# Patient Record
Sex: FEMALE | Race: BLACK OR AFRICAN AMERICAN | Employment: FULL TIME | URBAN - METROPOLITAN AREA
[De-identification: names, ages, dates, MRNs, and addresses within clinical notes are randomized per-mention and may not be internally consistent; named-entity substitution may affect disease eponyms.]

---

## 2017-02-15 DIAGNOSIS — I10 ESSENTIAL HYPERTENSION: ICD-10-CM

## 2017-02-15 RX ORDER — HYDROCHLOROTHIAZIDE 12.5 MG/1
12.5 TABLET ORAL DAILY
Qty: 90 TAB | Refills: 1 | Status: SHIPPED | OUTPATIENT
Start: 2017-02-15 | End: 2019-02-14 | Stop reason: SDUPTHER

## 2017-02-15 NOTE — TELEPHONE ENCOUNTER
See notes below. LOV 12/14/16  Last refill 12/14/16. Pt states she also needs a meter but it looks like Dr. Antione Louis sent a meter, strips & lancets on 12/27/16. Could you resend.

## 2017-03-10 ENCOUNTER — HOSPITAL ENCOUNTER (EMERGENCY)
Age: 32
Discharge: HOME | End: 2017-03-10
Attending: EMERGENCY MEDICINE
Payer: SELF-PAY

## 2017-03-10 VITALS
BODY MASS INDEX: 44.19 KG/M2 | HEART RATE: 98 BPM | TEMPERATURE: 98.3 F | HEIGHT: 60 IN | OXYGEN SATURATION: 97 % | RESPIRATION RATE: 16 BRPM | WEIGHT: 225.09 LBS | DIASTOLIC BLOOD PRESSURE: 99 MMHG | SYSTOLIC BLOOD PRESSURE: 159 MMHG

## 2017-03-10 DIAGNOSIS — J06.9 ACUTE UPPER RESPIRATORY INFECTION: Primary | ICD-10-CM

## 2017-03-10 LAB
FLUAV AG NPH QL IA: NEGATIVE
FLUBV AG NOSE QL IA: NEGATIVE

## 2017-03-10 PROCEDURE — 99282 EMERGENCY DEPT VISIT SF MDM: CPT

## 2017-03-10 PROCEDURE — 87804 INFLUENZA ASSAY W/OPTIC: CPT | Performed by: EMERGENCY MEDICINE

## 2017-03-10 RX ORDER — CROMOLYN SODIUM 5.2 MG/ML
1 SPRAY, METERED NASAL 4 TIMES DAILY
Qty: 26 ML | Refills: 0 | Status: SHIPPED | OUTPATIENT
Start: 2017-03-10 | End: 2017-11-29

## 2017-03-10 NOTE — ED NOTES
Pt given discharge instructions by Dr Omaira Shea, pt verbalizes an understanding, stable at time of discharge ambulates to maikel

## 2017-03-10 NOTE — LETTER
NOTIFICATION RETURN TO WORK / SCHOOL 
 
3/10/2017 11:10 AM 
 
Ms. Princess Simpson [address] [phone] To Whom It May Concern: 
 
Princess Simpson is currently under the care of [facility] EMERGENCY DEPT. She will return to work/school on: 3/13 If there are questions or concerns please have the patient contact our office. Sincerely, Risa Rudd.  Nuria Garcia MD

## 2017-03-10 NOTE — ED TRIAGE NOTES
Pt ambulates to treatment area she states that since yesterday she has had a sinus headache and congestion with yellow drainage. She has taken Nyquil and Allegra for the symptoms but continues to feel bad. She works in a  and is concerned about the flu.   Has had some chills but no fevers

## 2017-03-10 NOTE — DISCHARGE INSTRUCTIONS
Saline Nasal Washes: Care Instructions  Your Care Instructions  Saline nasal washes help keep the nasal passages open by washing out thick or dried mucus. This simple remedy can help relieve symptoms of allergies, sinusitis, and colds. It also can make the nose feel more comfortable by keeping the mucous membranes moist. You may notice a little burning sensation in your nose the first few times you use the solution, but this usually gets better in a few days. Follow-up care is a key part of your treatment and safety. Be sure to make and go to all appointments, and call your doctor if you are having problems. It's also a good idea to know your test results and keep a list of the medicines you take. How can you care for yourself at home? · You can buy premixed saline solution in a squeeze bottle or other sinus rinse products at a drugstore. Read and follow the instructions on the label. · You also can make your own saline solution by adding 1 teaspoon of salt and 1 teaspoon of baking soda to 2 cups of distilled water. · If you use a homemade solution, pour a small amount into a clean bowl. Using a rubber bulb syringe, squeeze the syringe and place the tip in the salt water. Pull a small amount of the salt water into the syringe by relaxing your hand. · Sit down with your head tilted slightly back. Do not lie down. Put the tip of the bulb syringe or the squeeze bottle a little way into one of your nostrils. Gently drip or squirt a few drops into the nostril. Repeat with the other nostril. Some sneezing and gagging are normal at first.  · Gently blow your nose. · Wipe the syringe or bottle tip clean after each use. · Repeat this 2 or 3 times a day. · Use nasal washes gently if you have nosebleeds often. When should you call for help? Watch closely for changes in your health, and be sure to contact your doctor if:  · You often get nosebleeds. · You have problems doing the nasal washes.   Where can you learn more? Go to http://salvatore-elton.info/. Enter [id number] in the search box to learn more about \"Saline Nasal Washes: Care Instructions. \"  Current as of: July 29, 2016  Content Version: 11.1  © 7704-1721 [employer]. Care instructions adapted under license by [employer] (which disclaims liability or warranty for this information). If you have questions about a medical condition or this instruction, always ask your healthcare professional. [address] any warranty or liability for your use of this information.

## 2017-03-10 NOTE — ED PROVIDER NOTES
Patient is a 27 y.o. female presenting with nasal congestion. The history is provided by the patient. Nasal Congestion   This is a new problem. The current episode started yesterday. The problem occurs constantly. The problem has been gradually worsening. Associated symptoms include headaches. Pertinent negatives include no chest pain, no abdominal pain and no shortness of breath. Nothing aggravates the symptoms. Nothing relieves the symptoms. Treatments tried: Nyquil, Allegra. The treatment provided no relief. Past Medical History:   Diagnosis Date    Anemia     Depression 2013    Diabetes ([address]     Essential hypertension     Hypertension     IBS (irritable bowel syndrome)        Past Surgical History:   Procedure Laterality Date     DELIVERY ONLY      TWins at 28 wks; IOL; NRFS         Family History:   Problem Relation Age of Onset    Hypertension Mother     Bleeding Prob Mother      blood clots    Thyroid Disease Mother     Diabetes Father    Amor Spell Elevated Lipids Father     Breast Cancer Paternal Aunt      breast    Cancer Maternal Grandmother      cervix    Uterine Cancer Maternal Grandmother 66     Hysterectomy and chemo    Colon Cancer Neg Hx     Ovarian Cancer Neg Hx        Social History     Social History    Marital status: SINGLE     Spouse name: N/A    Number of children: N/A    Years of education: N/A     Occupational History    Not on file. Social History Main Topics    Smoking status: Never Smoker    Smokeless tobacco: Never Used    Alcohol use Yes      Comment: occas    Drug use: No    Sexual activity: Yes     Partners: Male     Birth control/ protection: None      Comment: KJ; Other Topics Concern    Not on file     Social History Narrative         ALLERGIES: Pcn [penicillins]    Review of Systems   Constitutional: Negative for chills and fever. HENT: Negative for ear pain and sore throat. Eyes: Negative for pain.    Respiratory: Negative for chest tightness and shortness of breath. Cardiovascular: Negative for chest pain and leg swelling. Gastrointestinal: Negative for abdominal pain, nausea and vomiting. Genitourinary: Negative for dysuria and flank pain. Musculoskeletal: Negative for back pain. Skin: Negative for rash. Neurological: Positive for headaches. All other systems reviewed and are negative. Vitals:    03/10/17 1009   BP: (!) 159/99   Pulse: 98   Resp: 16   Temp: 98.3 °F (36.8 °C)   SpO2: 97%   Weight: 102.1 kg (225 lb 1.4 oz)   Height: 5' (1.524 m)            Physical Exam   Constitutional: She appears well-developed and well-nourished. HENT:   Head: Normocephalic and atraumatic. Mouth/Throat: Oropharynx is clear and moist.   Swollen nasal mucosa. Rhinnorrhea   Eyes: Conjunctivae and EOM are normal. Pupils are equal, round, and reactive to light. No scleral icterus. Neck: Neck supple. No tracheal deviation present. Cardiovascular: Normal rate, regular rhythm and normal heart sounds. Pulmonary/Chest: Effort normal and breath sounds normal. No respiratory distress. Abdominal: Soft. She exhibits no distension. There is no tenderness. There is no rebound and no guarding. Genitourinary:   Genitourinary Comments: deferred   Musculoskeletal: She exhibits no edema. Neurological: She is alert. Skin: Skin is warm and dry. Psychiatric: She has a normal mood and affect. Nursing note and vitals reviewed. [facility]  ED Course       Procedures      LABORATORY TESTS:  Recent Results (from the past 24 hour(s))   INFLUENZA A & B AG (RAPID TEST)    Collection Time: 03/10/17 10:18 AM   Result Value Ref Range    Influenza A Antigen NEGATIVE  NEG      Influenza B Antigen NEGATIVE  NEG         IMAGING RESULTS:  No results found. MEDICATIONS GIVEN:  Medications - No data to display    IMPRESSION:  1.  Acute upper respiratory infection        PLAN:  -  Nasal cromlyn, saline wash, OTC decongestants, PCP f/u    Disposition:  home     Condition: stable    Total critical care time spend exclusive of procedures:  0      Jarvis Burt MD

## 2017-05-05 ENCOUNTER — HOSPITAL ENCOUNTER (EMERGENCY)
Age: 32
Discharge: HOME | End: 2017-05-05
Attending: EMERGENCY MEDICINE | Admitting: EMERGENCY MEDICINE
Payer: SELF-PAY

## 2017-05-05 VITALS
WEIGHT: 216 LBS | TEMPERATURE: 98 F | SYSTOLIC BLOOD PRESSURE: 165 MMHG | HEART RATE: 94 BPM | DIASTOLIC BLOOD PRESSURE: 72 MMHG | OXYGEN SATURATION: 95 % | HEIGHT: 61 IN | BODY MASS INDEX: 40.78 KG/M2 | RESPIRATION RATE: 18 BRPM

## 2017-05-05 DIAGNOSIS — H10.33 ACUTE CONJUNCTIVITIS OF BOTH EYES, UNSPECIFIED ACUTE CONJUNCTIVITIS TYPE: Primary | ICD-10-CM

## 2017-05-05 PROCEDURE — 99282 EMERGENCY DEPT VISIT SF MDM: CPT

## 2017-05-05 RX ORDER — PSEUDOEPHEDRINE HCL 30 MG
60 TABLET ORAL
Qty: 12 TAB | Refills: 0 | Status: SHIPPED | OUTPATIENT
Start: 2017-05-05 | End: 2017-07-05 | Stop reason: ALTCHOICE

## 2017-05-05 RX ORDER — ERYTHROMYCIN 5 MG/G
OINTMENT OPHTHALMIC
Qty: 1 G | Refills: 0 | Status: SHIPPED | OUTPATIENT
Start: 2017-05-05 | End: 2017-07-05

## 2017-05-05 RX ORDER — FLUTICASONE PROPIONATE 50 MCG
2 SPRAY, SUSPENSION (ML) NASAL DAILY
Qty: 1 BOTTLE | Refills: 0 | Status: SHIPPED | OUTPATIENT
Start: 2017-05-05 | End: 2017-11-29

## 2017-05-05 NOTE — ED NOTES
The patient was discharged home by Dr. Brett Sun in stable condition. The patient is alert and oriented, is in no respiratory distress. The patient's diagnosis, condition and treatment were explained to patient or parent/guardian. The patient/responsible party expressed understanding. 3 prescriptions given to pt. No work/school note given to pt. A discharge plan has been developed. A  was not involved in the process. Aftercare instructions were given to the patient.

## 2017-05-05 NOTE — ED TRIAGE NOTES
Pt ambulatory to treatment area with c/o \"my left ear started hurting yesterday and this morning I woke up with some crusting to my left eye. \"  Pt states \"my left eye is itching also. 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 \"

## 2017-05-05 NOTE — DISCHARGE INSTRUCTIONS
Pinkeye: Care Instructions  Your Care Instructions    Pinkeye is redness and swelling of the eye surface and the conjunctiva (the lining of the eyelid and the covering of the white part of the eye). Pinkeye is also called conjunctivitis. Pinkeye is often caused by infection with bacteria or a virus. Dry air, allergies, smoke, and chemicals are other common causes. Pinkeye often clears on its own in 7 to 10 days. Antibiotics only help if the pinkeye is caused by bacteria. Pinkeye caused by infection spreads easily. If an allergy or chemical is causing pinkeye, it will not go away unless you can avoid whatever is causing it. Follow-up care is a key part of your treatment and safety. Be sure to make and go to all appointments, and call your doctor if you are having problems. Its also a good idea to know your test results and keep a list of the medicines you take. How can you care for yourself at home? · Wash your hands often. Always wash them before and after you treat pinkeye or touch your eyes or face. · Use moist cotton or a clean, wet cloth to remove crust. Wipe from the inside corner of the eye to the outside. Use a clean part of the cloth for each wipe. · Put cold or warm wet cloths on your eye a few times a day if the eye hurts. · Do not wear contact lenses or eye makeup until the pinkeye is gone. Throw away any eye makeup you were using when you got pinkeye. Clean your contacts and storage case. If you wear disposable contacts, use a new pair when your eye has cleared and it is safe to wear contacts again. · If the doctor gave you antibiotic ointment or eyedrops, use them as directed. Use the medicine for as long as instructed, even if your eye starts looking better soon. Keep the bottle tip clean, and do not let it touch the eye area. · To put in eyedrops or ointment:  ¨ Tilt your head back, and pull your lower eyelid down with one finger.   ¨ Drop or squirt the medicine inside the lower lid.  ¨ Close your eye for 30 to 60 seconds to let the drops or ointment move around. ¨ Do not touch the ointment or dropper tip to your eyelashes or any other surface. · Do not share towels, pillows, or washcloths while you have pinkeye. When should you call for help? Call your doctor now or seek immediate medical care if:  · You have pain in your eye, not just irritation on the surface. · You have a change in vision or loss of vision. · You have an increase in discharge from the eye. · Your eye has not started to improve or begins to get worse within 48 hours after you start using antibiotics. · Pinkeye lasts longer than 7 days. Watch closely for changes in your health, and be sure to contact your doctor if you have any problems. Where can you learn more? Go to http://salvatoreFinancial Fairy Taleselton.info/. Enter Y392 in the search box to learn more about \"Pinkeye: Care Instructions. \"  Current as of: May 27, 2016  Content Version: 11.2  © 8264-4863 [employer]. Care instructions adapted under license by [employer] (which disclaims liability or warranty for this information). If you have questions about a medical condition or this instruction, always ask your healthcare professional. [address] any warranty or liability for your use of this information. We hope that we have addressed all of your medical concerns. The examination and treatment you received in the Emergency Department were for an emergent problem and were not intended as complete care. It is important that you follow up with your healthcare provider(s) for ongoing care. If your symptoms worsen or do not improve as expected, and you are unable to reach your usual health care provider(s), you should return to the Emergency Department.       Today's healthcare is undergoing tremendous change, and patient satisfaction surveys are one of the many tools to assess the quality of medical care.  You may receive a survey from the [employer] organization regarding your experience in the Emergency Department. I hope that your experience has been completely positive, particularly the medical care that I provided. As such, please participate in the survey; anything less than excellent does not meet my expectations or intentions. [facility] and [facility] participate in nationally recognized quality of care measures. If your blood pressure is greater than 120/80, as reported below, we urge that you seek medical care to address the potential of high blood pressure, commonly known as hypertension. Hypertension can be hereditary or can be caused by certain medical conditions, pain, stress, or \"white coat syndrome. \"       Please make an appointment with your health care provider(s) for follow up of your Emergency Department visit. VITALS:   Patient Vitals for the past 8 hrs:   Temp Pulse Resp BP SpO2   05/05/17 0821 98 °F (36.7 °C) 94 18 165/72 95 %          Thank you for allowing us to provide you with medical care today. We realize that you have many choices for your emergency care needs. Please choose us in the future for any continued health care needs. Kian Chirinos , [address]: [phone]            No results found for this or any previous visit (from the past 24 hour(s)). No results found.

## 2017-05-05 NOTE — LETTER
NOTIFICATION RETURN TO WORK / SCHOOL 
 
5/5/2017 8:45 AM 
 
Ms. Maggi Orr [address] [place], [place] [phone] To Whom It May Concern: 
 
Maggi Orr is currently under the care of [facility] EMERGENCY DEPT. She will return to work/school on: 5/8/17 If there are questions or concerns please have the patient contact our office. Sincerely, Gm Jarrell.  Mary Olivia MD

## 2017-05-05 NOTE — ED PROVIDER NOTES
Patient is a 27 y.o. female presenting with eye problem and ear pain. The history is provided by the patient. Eye Problem    This is a new problem. The current episode started 6 to 12 hours ago. The problem occurs daily. The problem has not changed since onset. Both eyes are affected. The injury mechanism was none. The pain is at a severity of 0/10. The patient is experiencing no pain. There is no history of trauma to the eye. Known exposure: works at a . Pertinent negatives include no blurred vision, no double vision, no nausea, no vomiting, no fever and no pain. She has tried nothing for the symptoms. Ear Pain    The current episode started yesterday. The problem has not changed since onset. There has been no fever. The pain is at a severity of 7/10. Associated symptoms include rhinorrhea and sore throat. Pertinent negatives include no headaches, no abdominal pain, no vomiting and no rash. 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 Past Medical History:   Diagnosis Date    Anemia     Depression 2013    Diabetes ([address]     Essential hypertension     Hypertension     IBS (irritable bowel syndrome)        Past Surgical History:   Procedure Laterality Date     DELIVERY ONLY      TWins at 28 wks; IOL; NRFS         Family History:   Problem Relation Age of Onset    Hypertension Mother     Bleeding Prob Mother      blood clots    Thyroid Disease Mother     Diabetes Father    Win[place] Re Elevated Lipids Father     Breast Cancer Paternal Aunt      breast    Cancer Maternal Grandmother      cervix    Uterine Cancer Maternal Grandmother 66     Hysterectomy and chemo    Colon Cancer Neg Hx     Ovarian Cancer Neg Hx        Social History     Social History    Marital status: SINGLE     Spouse name: N/A    Number of children: N/A    Years of education: N/A     Occupational History    Not on file.      Social History Main Topics    Smoking status: Never Smoker    Smokeless tobacco: Never Used  Alcohol use Yes      Comment: occas    Drug use: No    Sexual activity: Yes     Partners: Male     Birth control/ protection: None      Comment: KJ; Other Topics Concern    Not on file     Social History Narrative         ALLERGIES: Pcn [penicillins]    Review of Systems   Constitutional: Negative for chills and fever. HENT: Positive for ear pain, rhinorrhea and sore throat. Eyes: Negative for blurred vision, double vision and pain. Respiratory: Negative for chest tightness and shortness of breath. Cardiovascular: Negative for chest pain and leg swelling. Gastrointestinal: Negative for abdominal pain, nausea and vomiting. Genitourinary: Negative for dysuria and flank pain. Musculoskeletal: Negative for back pain. Skin: Negative for rash. Neurological: Negative for headaches. All other systems reviewed and are negative. Vitals:    05/05/17 0821   BP: 165/72   Pulse: 94   Resp: 18   Temp: 98 °F (36.7 °C)   SpO2: 95%   Weight: 98 kg (216 lb)   Height: 5' 1\" (1.549 m)            Physical Exam   Constitutional: She appears well-developed and well-nourished. HENT:   Head: Normocephalic and atraumatic. Right Ear: Tympanic membrane and external ear normal.   Left Ear: Tympanic membrane and external ear normal.   Nose: Mucosal edema and rhinorrhea present. Mouth/Throat: Oropharynx is clear and moist. No oropharyngeal exudate or tonsillar abscesses. Eyes: EOM are normal. Pupils are equal, round, and reactive to light. Right conjunctiva is injected. Left conjunctiva is injected. No scleral icterus. Neck: Neck supple. No tracheal deviation present. Cardiovascular: Normal rate, regular rhythm and normal heart sounds. Pulmonary/Chest: Effort normal and breath sounds normal. No respiratory distress. Abdominal: Soft. She exhibits no distension. There is no tenderness. There is no rebound and no guarding.    Genitourinary:   Genitourinary Comments: deferred   Musculoskeletal: She exhibits no edema. Neurological: She is alert. Skin: Skin is warm and dry. Psychiatric: She has a normal mood and affect. Nursing note and vitals reviewed. MDM  ED Course         MDM:  27 y.o. female with no significant past medical history presents with ear pain, eye discharge  Differential diagnosis includes viral syndrome, conjunctivitis             LABORATORY TESTS:  Labs Reviewed - No data to display    IMAGING RESULTS:  No results found. MEDICATIONS GIVEN:  Medications - No data to display    IMPRESSION:  1.  Acute conjunctivitis of both eyes, unspecified acute conjunctivitis type        PLAN:  -  Antibiotic eye ointment, flonase, pseudophed    Disposition:  home, see patient instructions for treatment and plan    Condition:  good and stable    Total critical care time spent exclusive of procedures:  0    Lara Pemberton MD      Procedures

## 2017-06-27 ENCOUNTER — HOSPITAL ENCOUNTER (EMERGENCY)
Age: 32
Discharge: HOME | End: 2017-06-28
Attending: EMERGENCY MEDICINE
Payer: SELF-PAY

## 2017-06-27 DIAGNOSIS — K52.9 ILEITIS: Primary | ICD-10-CM

## 2017-06-27 DIAGNOSIS — A59.01 TRICHOMONAS VAGINITIS: ICD-10-CM

## 2017-06-27 DIAGNOSIS — R10.31 ABDOMINAL PAIN, RIGHT LOWER QUADRANT: ICD-10-CM

## 2017-06-27 PROCEDURE — 96361 HYDRATE IV INFUSION ADD-ON: CPT

## 2017-06-27 PROCEDURE — 99284 EMERGENCY DEPT VISIT MOD MDM: CPT

## 2017-06-27 PROCEDURE — 96375 TX/PRO/DX INJ NEW DRUG ADDON: CPT

## 2017-06-27 PROCEDURE — 96374 THER/PROPH/DIAG INJ IV PUSH: CPT

## 2017-06-27 PROCEDURE — 96372 THER/PROPH/DIAG INJ SC/IM: CPT

## 2017-06-27 RX ORDER — SODIUM CHLORIDE 0.9 % (FLUSH) 0.9 %
5-10 SYRINGE (ML) INJECTION EVERY 8 HOURS
Status: DISCONTINUED | OUTPATIENT
Start: 2017-06-27 | End: 2017-06-28 | Stop reason: HOSPADM

## 2017-06-27 RX ORDER — SODIUM CHLORIDE 0.9 % (FLUSH) 0.9 %
5-10 SYRINGE (ML) INJECTION AS NEEDED
Status: DISCONTINUED | OUTPATIENT
Start: 2017-06-27 | End: 2017-06-28 | Stop reason: HOSPADM

## 2017-06-27 NOTE — LETTER
[facility] EMERGENCY DEPT 
[address] [facility] [address] [id number] 
[phone] Work/School Note Date: 6/27/2017 To Whom It May concern: 
 
Kwasi Jeff was seen and treated today in the emergency room by the following provider(s): 
Attending Provider: Kris Ferraro MD.   
 
Kwasi Jeff may return to work on [facility].  
 
Sincerely, 
 
 
 
 
Kris Ferraro MD

## 2017-06-28 ENCOUNTER — APPOINTMENT (OUTPATIENT)
Dept: CT IMAGING | Age: 32
End: 2017-06-28
Attending: EMERGENCY MEDICINE
Payer: SELF-PAY

## 2017-06-28 VITALS
SYSTOLIC BLOOD PRESSURE: 137 MMHG | RESPIRATION RATE: 18 BRPM | DIASTOLIC BLOOD PRESSURE: 88 MMHG | WEIGHT: 228.4 LBS | BODY MASS INDEX: 43.12 KG/M2 | TEMPERATURE: 98.7 F | HEIGHT: 61 IN | HEART RATE: 101 BPM | OXYGEN SATURATION: 99 %

## 2017-06-28 LAB
ALBUMIN SERPL BCP-MCNC: 3.4 G/DL (ref 3.5–5)
ALBUMIN/GLOB SERPL: 0.9 {RATIO} (ref 1.1–2.2)
ALP SERPL-CCNC: 72 U/L (ref 45–117)
ALT SERPL-CCNC: 31 U/L (ref 12–78)
ANION GAP BLD CALC-SCNC: 8 MMOL/L (ref 5–15)
APPEARANCE UR: CLEAR
AST SERPL W P-5'-P-CCNC: 18 U/L (ref 15–37)
BACTERIA URNS QL MICRO: NEGATIVE /HPF
BASOPHILS # BLD AUTO: 0 K/UL (ref 0–0.1)
BASOPHILS # BLD: 0 % (ref 0–1)
BILIRUB SERPL-MCNC: 0.4 MG/DL (ref 0.2–1)
BILIRUB UR QL: NEGATIVE
BUN SERPL-MCNC: 9 MG/DL (ref 6–20)
BUN/CREAT SERPL: 12 (ref 12–20)
CALCIUM SERPL-MCNC: 8.5 MG/DL (ref 8.5–10.1)
CHLORIDE SERPL-SCNC: 101 MMOL/L (ref 97–108)
CO2 SERPL-SCNC: 26 MMOL/L (ref 21–32)
COLOR UR: ABNORMAL
CREAT SERPL-MCNC: 0.76 MG/DL (ref 0.55–1.02)
DIFFERENTIAL METHOD BLD: ABNORMAL
EOSINOPHIL # BLD: 0.3 K/UL (ref 0–0.4)
EOSINOPHIL NFR BLD: 2 % (ref 0–7)
EPITH CASTS URNS QL MICRO: ABNORMAL /LPF
ERYTHROCYTE [DISTWIDTH] IN BLOOD BY AUTOMATED COUNT: 12.7 % (ref 11.5–14.5)
GLOBULIN SER CALC-MCNC: 3.9 G/DL (ref 2–4)
GLUCOSE SERPL-MCNC: 111 MG/DL (ref 65–100)
GLUCOSE UR STRIP.AUTO-MCNC: NEGATIVE MG/DL
HCG UR QL: NEGATIVE
HCT VFR BLD AUTO: 36.3 % (ref 35–47)
HGB BLD-MCNC: 12 G/DL (ref 11.5–16)
HGB UR QL STRIP: ABNORMAL
KETONES UR QL STRIP.AUTO: NEGATIVE MG/DL
KOH PREP SPEC: NORMAL
LEUKOCYTE ESTERASE UR QL STRIP.AUTO: ABNORMAL
LIPASE SERPL-CCNC: 102 U/L (ref 73–393)
LYMPHOCYTES # BLD AUTO: 12 % (ref 12–49)
LYMPHOCYTES # BLD: 1.6 K/UL (ref 0.8–3.5)
MCH RBC QN AUTO: 29.6 PG (ref 26–34)
MCHC RBC AUTO-ENTMCNC: 33.1 G/DL (ref 30–36.5)
MCV RBC AUTO: 89.6 FL (ref 80–99)
MONOCYTES # BLD: 1.1 K/UL (ref 0–1)
MONOCYTES NFR BLD AUTO: 8 % (ref 5–13)
NEUTS SEG # BLD: 10.1 K/UL (ref 1.8–8)
NEUTS SEG NFR BLD AUTO: 78 % (ref 32–75)
NITRITE UR QL STRIP.AUTO: NEGATIVE
PH UR STRIP: 6 [PH] (ref 5–8)
PLATELET # BLD AUTO: 262 K/UL (ref 150–400)
POTASSIUM SERPL-SCNC: 3.6 MMOL/L (ref 3.5–5.1)
PROT SERPL-MCNC: 7.3 G/DL (ref 6.4–8.2)
PROT UR STRIP-MCNC: NEGATIVE MG/DL
RBC # BLD AUTO: 4.05 M/UL (ref 3.8–5.2)
RBC #/AREA URNS HPF: ABNORMAL /HPF (ref 0–5)
SERVICE CMNT-IMP: NORMAL
SODIUM SERPL-SCNC: 135 MMOL/L (ref 136–145)
SP GR UR REFRACTOMETRY: 1.02 (ref 1–1.03)
TRICHOMONAS UR QL MICRO: PRESENT
UROBILINOGEN UR QL STRIP.AUTO: 0.2 EU/DL (ref 0.2–1)
WBC # BLD AUTO: 13.1 K/UL (ref 3.6–11)
WBC URNS QL MICRO: ABNORMAL /HPF (ref 0–4)

## 2017-06-28 PROCEDURE — 36415 COLL VENOUS BLD VENIPUNCTURE: CPT | Performed by: EMERGENCY MEDICINE

## 2017-06-28 PROCEDURE — 74011250637 HC RX REV CODE- 250/637: Performed by: EMERGENCY MEDICINE

## 2017-06-28 PROCEDURE — 87086 URINE CULTURE/COLONY COUNT: CPT | Performed by: EMERGENCY MEDICINE

## 2017-06-28 PROCEDURE — 81025 URINE PREGNANCY TEST: CPT

## 2017-06-28 PROCEDURE — 81001 URINALYSIS AUTO W/SCOPE: CPT | Performed by: EMERGENCY MEDICINE

## 2017-06-28 PROCEDURE — 85025 COMPLETE CBC W/AUTO DIFF WBC: CPT | Performed by: EMERGENCY MEDICINE

## 2017-06-28 PROCEDURE — 74011000250 HC RX REV CODE- 250: Performed by: EMERGENCY MEDICINE

## 2017-06-28 PROCEDURE — 83690 ASSAY OF LIPASE: CPT | Performed by: EMERGENCY MEDICINE

## 2017-06-28 PROCEDURE — 74011250636 HC RX REV CODE- 250/636: Performed by: EMERGENCY MEDICINE

## 2017-06-28 PROCEDURE — 74177 CT ABD & PELVIS W/CONTRAST: CPT

## 2017-06-28 PROCEDURE — 87491 CHLMYD TRACH DNA AMP PROBE: CPT | Performed by: EMERGENCY MEDICINE

## 2017-06-28 PROCEDURE — 87210 SMEAR WET MOUNT SALINE/INK: CPT | Performed by: EMERGENCY MEDICINE

## 2017-06-28 PROCEDURE — 74011636320 HC RX REV CODE- 636/320: Performed by: EMERGENCY MEDICINE

## 2017-06-28 PROCEDURE — 80053 COMPREHEN METABOLIC PANEL: CPT | Performed by: EMERGENCY MEDICINE

## 2017-06-28 RX ORDER — ONDANSETRON 2 MG/ML
8 INJECTION INTRAMUSCULAR; INTRAVENOUS
Status: COMPLETED | OUTPATIENT
Start: 2017-06-28 | End: 2017-06-28

## 2017-06-28 RX ORDER — METRONIDAZOLE 250 MG/1
500 TABLET ORAL
Status: COMPLETED | OUTPATIENT
Start: 2017-06-28 | End: 2017-06-28

## 2017-06-28 RX ORDER — ONDANSETRON 8 MG/1
8 TABLET, ORALLY DISINTEGRATING ORAL
Qty: 12 TAB | Refills: 0 | Status: SHIPPED | OUTPATIENT
Start: 2017-06-28 | End: 2017-07-05 | Stop reason: ALTCHOICE

## 2017-06-28 RX ORDER — AZITHROMYCIN 250 MG/1
1000 TABLET, FILM COATED ORAL
Status: COMPLETED | OUTPATIENT
Start: 2017-06-28 | End: 2017-06-28

## 2017-06-28 RX ORDER — ONDANSETRON 4 MG/1
4 TABLET, ORALLY DISINTEGRATING ORAL
Status: COMPLETED | OUTPATIENT
Start: 2017-06-28 | End: 2017-06-28

## 2017-06-28 RX ORDER — LEVOFLOXACIN 750 MG/1
750 TABLET ORAL
Status: COMPLETED | OUTPATIENT
Start: 2017-06-28 | End: 2017-06-28

## 2017-06-28 RX ORDER — OXYCODONE AND ACETAMINOPHEN 5; 325 MG/1; MG/1
1-2 TABLET ORAL
Qty: 20 TAB | Refills: 0 | Status: SHIPPED | OUTPATIENT
Start: 2017-06-28 | End: 2017-07-05

## 2017-06-28 RX ORDER — OXYCODONE AND ACETAMINOPHEN 5; 325 MG/1; MG/1
1 TABLET ORAL
Status: COMPLETED | OUTPATIENT
Start: 2017-06-28 | End: 2017-06-28

## 2017-06-28 RX ORDER — LEVOFLOXACIN 750 MG/1
750 TABLET ORAL DAILY
Qty: 10 TAB | Refills: 0 | Status: SHIPPED | OUTPATIENT
Start: 2017-06-28 | End: 2017-07-08

## 2017-06-28 RX ORDER — ONDANSETRON 4 MG/1
4 TABLET, ORALLY DISINTEGRATING ORAL
Qty: 12 TAB | Refills: 0 | Status: SHIPPED | OUTPATIENT
Start: 2017-06-28 | End: 2017-07-05 | Stop reason: ALTCHOICE

## 2017-06-28 RX ORDER — METRONIDAZOLE 500 MG/1
500 TABLET ORAL 3 TIMES DAILY
Qty: 30 TAB | Refills: 0 | Status: SHIPPED | OUTPATIENT
Start: 2017-06-28 | End: 2017-07-05 | Stop reason: SINTOL

## 2017-06-28 RX ORDER — HYDROMORPHONE HYDROCHLORIDE 1 MG/ML
1 INJECTION, SOLUTION INTRAMUSCULAR; INTRAVENOUS; SUBCUTANEOUS
Status: COMPLETED | OUTPATIENT
Start: 2017-06-28 | End: 2017-06-28

## 2017-06-28 RX ADMIN — IOPAMIDOL 100 ML: 755 INJECTION, SOLUTION INTRAVENOUS at 01:00

## 2017-06-28 RX ADMIN — SODIUM CHLORIDE 1000 ML: 900 INJECTION, SOLUTION INTRAVENOUS at 00:35

## 2017-06-28 RX ADMIN — OXYCODONE HYDROCHLORIDE AND ACETAMINOPHEN 1 TABLET: 5; 325 TABLET ORAL at 02:59

## 2017-06-28 RX ADMIN — ONDANSETRON 8 MG: 2 INJECTION INTRAMUSCULAR; INTRAVENOUS at 00:38

## 2017-06-28 RX ADMIN — CEFTRIAXONE SODIUM 250 MG: 250 INJECTION, POWDER, FOR SOLUTION INTRAMUSCULAR; INTRAVENOUS at 02:44

## 2017-06-28 RX ADMIN — HYDROMORPHONE HYDROCHLORIDE 1 MG: 1 INJECTION, SOLUTION INTRAMUSCULAR; INTRAVENOUS; SUBCUTANEOUS at 00:39

## 2017-06-28 RX ADMIN — AZITHROMYCIN 1000 MG: 250 TABLET, FILM COATED ORAL at 02:48

## 2017-06-28 RX ADMIN — ONDANSETRON 4 MG: 4 TABLET, ORALLY DISINTEGRATING ORAL at 02:59

## 2017-06-28 RX ADMIN — LEVOFLOXACIN 750 MG: 750 TABLET, FILM COATED ORAL at 02:48

## 2017-06-28 RX ADMIN — METRONIDAZOLE 500 MG: 250 TABLET, FILM COATED ORAL at 02:48

## 2017-06-28 NOTE — ED PROVIDER NOTES
HPI Comments: Susan Rondon  Patient presents emergency room chief complaint of abdominal pain. She initially had pain in her pain located in the right lower abdomen. The pain radiates to her back. The pain was initially coming and going, but has become constant. The pain is sharp and very severe. The pain is 11/10. She has felt subjective fever, but there's been no documented fever. She has noted chills. She denies any shortness of breath. She notes the pain is increased with cough. She has a mild nonproductive cough. She has nausea, but no vomiting. She denies constipation, and states her last bowel movement was at 12 PM. No dysuria or hematuria. She does have pink spotting or vaginal discharge. She is taking Tylenol with no relief. Her surgical history includes C-sections x2. She has no personal history of kidney stones. Her nephew gets kidney stones. She has no other concerns or complaints. Her mother drove her to the ED. Patient is a 27 y.o. female presenting with abdominal pain. The history is provided by the patient. Abdominal Pain    Associated symptoms include nausea and vomiting. Pertinent negatives include no fever, no diarrhea, no dysuria, no headaches and no chest pain.         Past Medical History:   Diagnosis Date    Anemia     Depression 2013    Diabetes ([facility] [address]     Essential hypertension     Hypertension     IBS (irritable bowel syndrome)        Past Surgical History:   Procedure Laterality Date     DELIVERY ONLY      TWins at 28 wks; IOL; NRFS         Family History:   Problem Relation Age of Onset    Hypertension Mother     Bleeding Prob Mother      blood clots    Thyroid Disease Mother     Diabetes Father    [place] Elevated Lipids Father     Breast Cancer Paternal Aunt      breast    Cancer Maternal Grandmother      cervix    Uterine Cancer Maternal Grandmother 66     Hysterectomy and chemo    Colon Cancer Neg Hx     Ovarian Cancer Neg Hx        Social History Social History    Marital status: SINGLE     Spouse name: N/A    Number of children: N/A    Years of education: N/A     Occupational History    Not on file. Social History Main Topics    Smoking status: Never Smoker    Smokeless tobacco: Never Used    Alcohol use Yes      Comment: occas    Drug use: No    Sexual activity: Yes     Partners: Male     Birth control/ protection: None      Comment: KJ; Other Topics Concern    Not on file     Social History Narrative         ALLERGIES: Pcn [penicillins]    Review of Systems   Constitutional: Negative for appetite change and fever. HENT: Negative for congestion, nosebleeds and sore throat. Eyes: Negative for discharge. Respiratory: Negative for cough and shortness of breath. Cardiovascular: Negative for chest pain. Gastrointestinal: Positive for abdominal pain, nausea and vomiting. Negative for diarrhea. Genitourinary: Positive for vaginal bleeding (spotting - has IUD). Negative for dysuria, vaginal discharge and vaginal pain. Musculoskeletal: Negative. Skin: Negative for rash. Neurological: Negative for weakness and headaches. Hematological: Negative for adenopathy. Psychiatric/Behavioral: Negative. All other systems reviewed and are negative. Vitals:    06/27/17 2317 06/28/17 0105 06/28/17 0110 06/28/17 0306   BP: (!) 172/99 (!) 150/92  137/88   Pulse: (!) 124   (!) 101   Resp: 20   18   Temp: 98.7 °F (37.1 °C)      SpO2: 100%  97% 99%   Weight: 103.6 kg (228 lb 6.3 oz)      Height: 5' 1\" (1.549 m)               Physical Exam   Constitutional: She is oriented to person, place, and time. She appears well-developed and well-nourished. She appears distressed. HENT:   Head: Normocephalic and atraumatic. Mouth/Throat: Oropharynx is clear and moist.   Eyes: Conjunctivae are normal.   Neck: Normal range of motion. Neck supple. Cardiovascular: Regular rhythm and normal heart sounds.     tachycardia   Pulmonary/Chest: Effort normal and breath sounds normal.   Abdominal: Soft. Bowel sounds are normal. There is tenderness (Rlq). There is no rebound and no guarding. Genitourinary: Vaginal discharge found. Genitourinary Comments: Strings for IUD visualized. Discharge noted. Mild pain with cervical motion. Musculoskeletal: Normal range of motion. She exhibits no edema or tenderness. Neurological: She is alert and oriented to person, place, and time. Skin: Skin is warm and dry. Psychiatric: She has a normal mood and affect. Her behavior is normal.   Nursing note and vitals reviewed. [facility]  ED Course       Procedures    A/P:  1. Terminal ileitis - At discharge, the patient notes she may have a diagnosis from Dr. Alvina Crane of Crohns disease from colonoscopy >10 years ago. She is advised to f/u wt Dr. Alvina Crane ASAP. Will treat with Cipro and Flagyl. Percocet and Zofran prn.  2. Trichomonas - Flagyl. 3. Cervical motion tenderness - may be referred pain from ileitis. However, if chlamydia positive, she should be treated with Doxycylcine for possible PID. Patient's results have been reviewed with them. Patient and/or family have verbally conveyed their understanding and agreement of the patient's signs, symptoms, diagnosis, treatment and prognosis and additionally agree to follow up as recommended or return to the Emergency Room should their condition change prior to follow-up. Discharge instructions have also been provided to the patient with some educational information regarding their diagnosis as well a list of reasons why they would want to return to the ER prior to their follow-up appointment should their condition change.

## 2017-06-28 NOTE — ED NOTES
Pt moved to room 11 for pelvic exam, MD discussed test results with patient, pt verbalized understanding.  Pelvic exam completed by MD, swabs sent to lab

## 2017-06-28 NOTE — ED TRIAGE NOTES
Triage note: Pt reports R side abd pain that started tonight. Pt reports that she has an IUD and had some spotting and fleshy discharge.

## 2017-06-28 NOTE — ED NOTES
Patient discharged by MD with RN at bedside. Pt verbalized understanding of discharge instructions and denies questions or concerns. Pt given prescriptions.  Pt stable and ambulatory at time of discharge

## 2017-06-28 NOTE — DISCHARGE INSTRUCTIONS
We hope that we have addressed all of your medical concerns. The examination and treatment you received in the Emergency Department were for an emergent problem and were not intended as complete care. It is important that you follow up with your healthcare provider(s) for ongoing care. If your symptoms worsen or do not improve as expected, and you are unable to reach your usual health care provider(s), you should return to the Emergency Department. Today's healthcare is undergoing tremendous change, and patient satisfaction surveys are one of the many tools to assess the quality of medical care. You may receive a survey from the [employer] regarding your experience in the Emergency Department. I hope that your experience has been completely positive, particularly the medical care that I provided. As such, please participate in the survey; anything less than excellent does not meet my expectations or intentions. [facility] and [facility] participate in nationally recognized quality of care measures. If your blood pressure is greater than 120/80, as reported below, we urge that you seek medical care to address the potential of high blood pressure, commonly known as hypertension. Hypertension can be hereditary or can be caused by certain medical conditions, pain, stress, or \"white coat syndrome. \"       Please make an appointment with your health care provider(s) for follow up of your Emergency Department visit. VITALS:   Patient Vitals for the past 8 hrs:   Temp Pulse Resp BP SpO2   06/28/17 0110 - - - - 97 %   06/28/17 0105 - - - (!) 150/92 -   06/27/17 2317 98.7 °F (37.1 °C) (!) 124 20 (!) 172/99 100 %          Thank you for allowing us to provide you with medical care today. We realize that you have many choices for your emergency care needs. Please choose us in the future for any continued health care needs.       Regards, Ross Cervantes Tonio [facility], [address]   Office: [phone]          Recent Results (from the past 24 hour(s))   URINALYSIS W/ RFLX MICROSCOPIC    Collection Time: 06/28/17 12:09 AM   Result Value Ref Range    Color YELLOW/STRAW      Appearance CLEAR CLEAR      Specific gravity 1.020 1.003 - 1.030      pH (UA) 6.0 5.0 - 8.0      Protein NEGATIVE  NEG mg/dL    Glucose NEGATIVE  NEG mg/dL    Ketone NEGATIVE  NEG mg/dL    Bilirubin NEGATIVE  NEG      Blood MODERATE (A) NEG      Urobilinogen 0.2 0.2 - 1.0 EU/dL    Nitrites NEGATIVE  NEG      Leukocyte Esterase SMALL (A) NEG     CBC WITH AUTOMATED DIFF    Collection Time: 06/28/17 12:09 AM   Result Value Ref Range    WBC 13.1 (H) 3.6 - 11.0 K/uL    RBC 4.05 3.80 - 5.20 M/uL    HGB 12.0 11.5 - 16.0 g/dL    HCT 36.3 35.0 - 47.0 %    MCV 89.6 80.0 - 99.0 FL    MCH 29.6 26.0 - 34.0 PG    MCHC 33.1 30.0 - 36.5 g/dL    RDW 12.7 11.5 - 14.5 %    PLATELET 816 738 - 685 K/uL    NEUTROPHILS 78 (H) 32 - 75 %    LYMPHOCYTES 12 12 - 49 %    MONOCYTES 8 5 - 13 %    EOSINOPHILS 2 0 - 7 %    BASOPHILS 0 0 - 1 %    ABS. NEUTROPHILS 10.1 (H) 1.8 - 8.0 K/UL    ABS. LYMPHOCYTES 1.6 0.8 - 3.5 K/UL    ABS. MONOCYTES 1.1 (H) 0.0 - 1.0 K/UL    ABS. EOSINOPHILS 0.3 0.0 - 0.4 K/UL    ABS. BASOPHILS 0.0 0.0 - 0.1 K/UL    DF AUTOMATED     METABOLIC PANEL, COMPREHENSIVE    Collection Time: 06/28/17 12:09 AM   Result Value Ref Range    Sodium 135 (L) 136 - 145 mmol/L    Potassium 3.6 3.5 - 5.1 mmol/L    Chloride 101 97 - 108 mmol/L    CO2 26 21 - 32 mmol/L    Anion gap 8 5 - 15 mmol/L    Glucose 111 (H) 65 - 100 mg/dL    BUN 9 6 - 20 MG/DL    Creatinine 0.76 0.55 - 1.02 MG/DL    BUN/Creatinine ratio 12 12 - 20      GFR est AA >60 >60 ml/min/1.73m2    GFR est non-AA >60 >60 ml/min/1.73m2    Calcium 8.5 8.5 - 10.1 MG/DL    Bilirubin, total 0.4 0.2 - 1.0 MG/DL    ALT (SGPT) 31 12 - 78 U/L    AST (SGOT) 18 15 - 37 U/L    Alk.  phosphatase 72 45 - 117 U/L    Protein, total 7.3 6.4 - 8.2 g/dL    Albumin 3.4 (L) 3.5 - 5.0 g/dL    Globulin 3.9 2.0 - 4.0 g/dL    A-G Ratio 0.9 (L) 1.1 - 2.2     LIPASE    Collection Time: 06/28/17 12:09 AM   Result Value Ref Range    Lipase 102 73 - 393 U/L   URINE MICROSCOPIC ONLY    Collection Time: 06/28/17 12:09 AM   Result Value Ref Range    WBC 10-20 0 - 4 /hpf    RBC 5-10 0 - 5 /hpf    Epithelial cells MODERATE (A) FEW /lpf    Bacteria NEGATIVE  NEG /hpf    Trichomonas PRESENT (A) NEG     HCG URINE, QL. - POC    Collection Time: 06/28/17 12:12 AM   Result Value Ref Range    Pregnancy test,urine (POC) NEGATIVE  NEG     KOH, OTHER SOURCES    Collection Time: 06/28/17  2:26 AM   Result Value Ref Range    Special Requests: NO SPECIAL REQUESTS      KOH NO YEAST SEEN         Ct Abd Pelv W Cont    Result Date: 6/28/2017  EXAM:  CT abdomen pelvis with contrast INDICATION: Right lower abdomen pain. COMPARISON: 10/26/2014. TECHNIQUE: Helical CT of the abdomen  and pelvis  following the uneventful intravenous administration of nonionic contrast.  Coronal and sagittal reformats are performed. CT dose reduction was achieved through use of a standardized protocol tailored for this examination and automatic exposure control for dose modulation. Adaptive statistical iterative reconstruction (ASIR) was utilized. FINDINGS: The visualized lung bases demonstrate no mass or consolidation. The heart size is normal. There is no pericardial or pleural effusion. The liver, spleen, pancreas, and adrenal glands are normal. The kidneys are symmetric without hydronephrosis. There are gallstones without intra- or extra-hepatic biliary dilatation. There is mild dilatation of a segment of the terminal ileum without a discrete transition point. There is mild adjacent mesenteric fat stranding. There are no dilated bowel loops proximal or distal to this abnormal terminal ileum segment. The appendix is normal.  There are no enlarged lymph nodes.   There is no free fluid or free air. The aorta tapers without aneurysm. There is a small fat-containing umbilical hernia. The urinary bladder is normal.  There is no pelvic mass. An IUD is present. The bony structures are age-appropriate. IMPRESSION: Findings of nonspecific infection or inflammation involving the terminal ileum. There is no bowel obstruction. The appendix is unremarkable. Abdominal Pain: Care Instructions  Your Care Instructions    Abdominal pain has many possible causes. Some aren't serious and get better on their own in a few days. Others need more testing and treatment. If your pain continues or gets worse, you need to be rechecked and may need more tests to find out what is wrong. You may need surgery to correct the problem. Don't ignore new symptoms, such as fever, nausea and vomiting, urination problems, pain that gets worse, and dizziness. These may be signs of a more serious problem. Your doctor may have recommended a follow-up visit in the next 8 to 12 hours. If you are not getting better, you may need more tests or treatment. The doctor has checked you carefully, but problems can develop later. If you notice any problems or new symptoms, get medical treatment right away. Follow-up care is a key part of your treatment and safety. Be sure to make and go to all appointments, and call your doctor if you are having problems. It's also a good idea to know your test results and keep a list of the medicines you take. How can you care for yourself at home? · Rest until you feel better. · To prevent dehydration, drink plenty of fluids, enough so that your urine is light yellow or clear like water. Choose water and other caffeine-free clear liquids until you feel better. If you have kidney, heart, or liver disease and have to limit fluids, talk with your doctor before you increase the amount of fluids you drink. · If your stomach is upset, eat mild foods, such as rice, dry toast or crackers, bananas, and applesauce. Try eating several small meals instead of two or three large ones. · Wait until 48 hours after all symptoms have gone away before you have spicy foods, alcohol, and drinks that contain caffeine. · Do not eat foods that are high in fat. · Avoid anti-inflammatory medicines such as aspirin, ibuprofen (Advil, Motrin), and naproxen (Aleve). These can cause stomach upset. Talk to your doctor if you take daily aspirin for another health problem. When should you call for help? Call 911 anytime you think you may need emergency care. For example, call if:  · You passed out (lost consciousness). · You pass maroon or very bloody stools. · You vomit blood or what looks like coffee grounds. · You have new, severe belly pain. Call your doctor now or seek immediate medical care if:  · Your pain gets worse, especially if it becomes focused in one area of your belly. · You have a new or higher fever. · Your stools are black and look like tar, or they have streaks of blood. · You have unexpected vaginal bleeding. · You have symptoms of a urinary tract infection. These may include:  ¨ Pain when you urinate. ¨ Urinating more often than usual.  ¨ Blood in your urine. · You are dizzy or lightheaded, or you feel like you may faint. Watch closely for changes in your health, and be sure to contact your doctor if:  · You are not getting better after 1 day (24 hours). Where can you learn more? Go to http://salvatore-elton.info/. Enter T781 in the search box to learn more about \"Abdominal Pain: Care Instructions. \"  Current as of: March 20, 2017  Content Version: 11.3  © 7451-8687 [employer]. Care instructions adapted under license by [employer] (which disclaims liability or warranty for this information).  If you have questions about a medical condition or this instruction, always ask your healthcare professional. [address] any warranty or liability for your use of this information. Trichomoniasis: Care Instructions  Your Care Instructions  Trichomoniasis is a sexually transmitted infection (STI) that is spread by having sex with an infected partner. Trichomoniasis is commonly called trich (say \"trick\"). In women, trich may cause vaginal itching and a smelly discharge. But in many cases, especially in men, there are no symptoms. Hermesnetta Secretary is treated so that you do not spread it to others. Both you and your sex partner or partners should be treated at the same time so you do not infect each other again. Trich may cause problems with pregnancy. Your doctor will talk with you about treatment for Trich if you are pregnant. Follow-up care is a key part of your treatment and safety. Be sure to make and go to all appointments, and call your doctor if you are having problems. Its also a good idea to know your test results and keep a list of the medicines you take. How can you care for yourself at home? · Take your antibiotics as directed. Do not stop taking them just because you feel better. You need to take the full course of antibiotics. · Do not have sex while you are being treated. If your doctor gave you a single dose of antibiotics, do not have sex for one week after being treated and until your partner also has been treated. · Tell your sex partner (or partners) that he or she will also need to be tested and treated. · Use a cold water compress or cool baths to relieve itching. To prevent trichomoniasis in the future  · Use latex condoms every time you have sex. Use them from the beginning to the end of sexual contact. · Talk to your partner before having sex. Find out if he or she has or is at risk for trich or any other STI. Keep in mind that a person may be able to spread an STI even if he or she does not have symptoms. · Do not have sex if you are being treated for trich or any other STI.   · Do not have sex with anyone who has symptoms of an STI, such as sores on the genitals or mouth. · Having one sex partner (who does not have STIs and does not have sex with anyone else) is a good way to avoid STIs. When should you call for help? Call your doctor now or seek immediate medical care if:  · You have unusual vaginal bleeding. · You have a fever. · You have new discharge from the vagina or penis. · You have pelvic pain. Watch closely for changes in your health, and be sure to contact your doctor if:  · You do not get better as expected. · You have any new symptoms or your symptoms get worse. Where can you learn more? Go to http://salvatore-elton.info/. Enter Q389 in the search box to learn more about \"Trichomoniasis: Care Instructions. \"  Current as of: 2017  Content Version: 11.3  © 9386-8305 [employer]. Care instructions adapted under license by [employer] (which disclaims liability or warranty for this information). If you have questions about a medical condition or this instruction, always ask your healthcare professional. [address] any warranty or liability for your use of this information. eBillme Activation    Thank you for requesting access to [employer]. Please follow the instructions below to securely access and download your online medical record. [employer] allows you to send messages to your doctor, view your test results, renew your prescriptions, schedule appointments, and more. How Do I Sign Up? 1. In your internet browser, go to [email or web address]  2. Click on the First Time User? Click Here link in the Sign In box. You will be redirect to the New Member Sign Up page. 3. Enter your eBillme Access Code exactly as it appears below. You will not need to use this code after youve completed the sign-up process. If you do not sign up before the expiration date, you must request a new code.     eBillme Access Code: [email or web address]  Expires: 2017 11:09 PM (This is the date your X[employer] access code will )    4. Enter the last four digits of your Social Security Number (xxxx) and Date of Birth (mm/dd/yyyy) as indicated and click Submit. You will be taken to the next sign-up page. 5. Create a [employer] ID. This will be your XODIS login ID and cannot be changed, so think of one that is secure and easy to remember. 6. Create a XODIS password. You can change your password at any time. 7. Enter your Password Reset Question and Answer. This can be used at a later time if you forget your password. 8. Enter your e-mail address. You will receive e-mail notification when new information is available in [address] 9. Click Sign Up. You can now view and download portions of your medical record. 10. Click the Download Summary menu link to download a portable copy of your medical information. Additional Information    If you have questions, please visit the Frequently Asked Questions section of the XODIS website at [email or web address] Remember, XODIS is NOT to be used for urgent needs. For medical emergencies, dial 911.

## 2017-06-29 LAB
BACTERIA SPEC CULT: NORMAL
CC UR VC: NORMAL
SERVICE CMNT-IMP: NORMAL

## 2017-06-30 LAB
C TRACH DNA SPEC QL NAA+PROBE: NEGATIVE
N GONORRHOEA DNA SPEC QL NAA+PROBE: POSITIVE
SAMPLE TYPE: ABNORMAL
SERVICE CMNT-IMP: ABNORMAL
SPECIMEN SOURCE: ABNORMAL

## 2017-07-05 ENCOUNTER — HOSPITAL ENCOUNTER (OUTPATIENT)
Dept: LAB | Age: 32
Discharge: HOME | End: 2017-07-05
Payer: MEDICAID

## 2017-07-05 ENCOUNTER — OFFICE VISIT (OUTPATIENT)
Dept: MIDWIFE SERVICES | Age: 32
End: 2017-07-05

## 2017-07-05 VITALS
BODY MASS INDEX: 42.29 KG/M2 | DIASTOLIC BLOOD PRESSURE: 103 MMHG | HEART RATE: 84 BPM | HEIGHT: 61 IN | WEIGHT: 224 LBS | SYSTOLIC BLOOD PRESSURE: 145 MMHG

## 2017-07-05 DIAGNOSIS — Z01.419 ENCOUNTER FOR WELL WOMAN EXAM WITH ROUTINE GYNECOLOGICAL EXAM: Primary | ICD-10-CM

## 2017-07-05 DIAGNOSIS — Z20.2 TRICHOMONAS CONTACT, TREATED: ICD-10-CM

## 2017-07-05 PROCEDURE — 88175 CYTOPATH C/V AUTO FLUID REDO: CPT | Performed by: OBSTETRICS & GYNECOLOGY

## 2017-07-05 PROCEDURE — 87624 HPV HI-RISK TYP POOLED RSLT: CPT | Performed by: OBSTETRICS & GYNECOLOGY

## 2017-07-05 NOTE — PROGRESS NOTES
Chief Complaint   Patient presents with    Well Woman     Visit Vitals    BP (!) 145/103    Pulse 84    Ht 5' 1\" (1.549 m)    Wt 224 lb (101.6 kg)    BMI 42.32 kg/m2     1. Have you been to the ER, urgent care clinic since your last visit? Hospitalized since your last visit? No    2. Have you seen or consulted any other health care providers outside of the [address] [place] [address] since your last visit? Include any pap smears or colon screening.  No      Here today for well woman exam      Verbal orders for pap and nuswab per dr Mariia Hannah

## 2017-07-05 NOTE — PROGRESS NOTES
Well Woman Check Up       Subjective:     27 y.o.  AmericanF   LMP:  for routine annual exam    Social History: single partner, contraception - IUD. Pertinent past medical history: . PAP History:    Neg  Preventive Measures. Colonoscopy:  Age 48 or earlier based on signigicant FH  Mammogram:  Age 48 or earlier based on FH  DEXA: Age 72 or sooner based on risk factors  Lipids:  Age 36 or based on FH/risk factors    Patient Active Problem List    Diagnosis Date Noted    Pre-diabetes 2016    Depression 2013    Fibroids 2012    HTN (hypertension) 10/12/2012     Current Outpatient Prescriptions   Medication Sig Dispense Refill    levoFLOXacin (LEVAQUIN) 750 mg tablet Take 1 Tab by mouth daily for 10 days. 10 Tab 0    fluticasone (FLONASE) 50 mcg/actuation nasal spray 2 Sprays by Both Nostrils route daily. 1 Bottle 0    cromolyn (NASALCROM) 5.2 mg/spray (4 %) nasal spray 1 Deputy by Both Nostrils route four (4) times daily. 26 mL 0    hydroCHLOROthiazide (HYDRODIURIL) 12.5 mg tablet Take 1 Tab by mouth daily. 90 Tab 1    glucose blood VI test strips (FREESTYLE LITE STRIPS) strip Check fasting blood glucose daily. 100 Strip 2    Blood-Glucose Meter monitoring kit Check fasting blood glucose daily as directed. Please dispense Freestyle Lite meter 1 Kit 0    Lancets misc Test fasting blood glucose daily. 100 Each 2    metFORMIN (GLUCOPHAGE) 500 mg tablet Take 1 Tab by mouth daily (with dinner). 90 Tab 1    ranitidine (ZANTAC) 150 mg tablet Take 1 Tab by mouth two (2) times a day. 60 Tab 1    oxyCODONE-acetaminophen (PERCOCET) 5-325 mg per tablet Take 1-2 Tabs by mouth every four (4) hours as needed for Pain for up to 7 days. Max Daily Amount: 12 Tabs.  20 Tab 0     Allergies   Allergen Reactions    Pcn [Penicillins] Hives     Past Medical History:   Diagnosis Date    Anemia     Depression 2013    Diabetes ([address]     Essential hypertension     Hypertension  IBS (irritable bowel syndrome)      Past Surgical History:   Procedure Laterality Date     DELIVERY ONLY      TWins at 28 wks; IOL; NRFS     Family History   Problem Relation Age of Onset    Hypertension Mother     Bleeding Prob Mother      blood clots    Thyroid Disease Mother     Diabetes Father    [facility] Elevated Lipids Father     Breast Cancer Paternal Aunt      breast    Cancer Maternal Grandmother      cervix    Uterine Cancer Maternal Grandmother 66     Hysterectomy and chemo    Colon Cancer Neg Hx     Ovarian Cancer Neg Hx      Social History   Substance Use Topics    Smoking status: Never Smoker    Smokeless tobacco: Never Used    Alcohol use Yes      Comment: occas        ROS:  Feeling well. No dyspnea or chest pain on exertion. No abdominal pain, change in bowel habits, black or bloody stools. No urinary tract symptoms. GYN ROS: normal menses, no abnormal bleeding, pelvic pain or discharge, no breast pain or new or enlarging lumps on self exam, she complains of intolerance of abx for trich,. No neurological complaints. Objective:     Visit Vitals    BP (!) 145/103    Pulse 84    Ht 5' 1\" (1.549 m)    Wt 224 lb (101.6 kg)    BMI 42.32 kg/m2     Gen: The patient appears well, alert, oriented x 3, in no distress. ENT:  normal.   Neck: supple. No adenopathy or thyromegaly. GRISEL. Lungs:  clear, good air entry, no wheezes, rhonchi or rales. CV: S1 and S2 normal, no murmurs, regular rate and rhythm. Abdomen: soft without tenderness, guarding, mass or organomegaly. Extremities:  no edema, normal peripheral pulses. Neurological:normal, no focal findings. BREAST EXAM: Examined upright and supine. Benign symmetrical breasts with everted nipples. No masses, no nodes. No nipple retraction or discharge. No skin retraction or subclavicular or axillary lymphadenopathy.     PELVIC EXAM: VULVA: normal appearing vulva with no masses, tenderness or lesions, VAGINA: normal appearing vagina with normal color and discharge, no lesions, CERVIX: normal appearing cervix without discharge or lesions, UTERUS: uterus is normal size, shape, consistency and nontender, ADNEXA: normal adnexa in size, nontender and no masses, PAP: Pap smear done today, thin-prep method, DNA probe for chlamydia and GC obtained, HPV test exam chaperoned by:  Radha Goode RN      Assessment/Plan:     Kb Moon was seen today for well woman. Diagnoses and all orders for this visit:    Encounter for well woman exam with routine gynecological exam  -     PAP (IMAGE GUIDED) + HPV HIGH RISK    Trichomonas contact, treated  -     CT/NG/T.VAGINALIS AMPLIFICATION      Follow-up Disposition:  Return in about 1 year (around 7/5/2018) for Annual exam pending labs. Felisa Corona MD, [address] [place] [address], Retired    Xavier Alfred, [address]

## 2017-07-07 LAB
C TRACH RRNA SPEC QL NAA+PROBE: NEGATIVE
N GONORRHOEA RRNA SPEC QL NAA+PROBE: POSITIVE
T VAGINALIS RRNA SPEC QL NAA+PROBE: POSITIVE

## 2017-07-07 NOTE — PROGRESS NOTES
NG needs 125 mg IM Rocephin, and report to Health department. Will need JOSE in 6 weeks  T. Vaginalis needs 2 grams of Flagyl single dose and JOSE in 6 weeks. Report to Health Department and partner testing.

## 2017-07-11 RX ORDER — METRONIDAZOLE 500 MG/1
2000 TABLET ORAL ONCE
Qty: 4 TAB | Refills: 0 | Status: SHIPPED | OUTPATIENT
Start: 2017-07-11 | End: 2017-07-11

## 2017-07-11 NOTE — PROGRESS NOTES
Spoke with patient and she is coming in tomorrow for her shot. Her flagyl prescription was sent to her pharmacy today and patient states she will take today.

## 2017-07-12 ENCOUNTER — CLINICAL SUPPORT (OUTPATIENT)
Dept: MIDWIFE SERVICES | Age: 32
End: 2017-07-12

## 2017-07-12 VITALS
WEIGHT: 224 LBS | HEART RATE: 92 BPM | DIASTOLIC BLOOD PRESSURE: 84 MMHG | BODY MASS INDEX: 42.29 KG/M2 | SYSTOLIC BLOOD PRESSURE: 132 MMHG | HEIGHT: 61 IN

## 2017-07-12 DIAGNOSIS — A54.00: Primary | ICD-10-CM

## 2017-07-12 RX ORDER — CEFTRIAXONE 500 MG/1
500 INJECTION, POWDER, FOR SOLUTION INTRAMUSCULAR; INTRAVENOUS ONCE
Qty: 1 VIAL | Refills: 0
Start: 2017-07-12 | End: 2017-07-12

## 2017-07-12 NOTE — PROGRESS NOTES
Saw patient today and she received her injection of rocephin in her left glute and did say she took her flagyl yesterday and that she will make an appt for 6 weeks from now for a test of cure. Health dept was also notified of her STD.

## 2017-07-12 NOTE — PROGRESS NOTES
Chief Complaint   Patient presents with    Injection     of rocephin to treat her gonnorheae     Visit Vitals    /84    Pulse 92    Ht 5' 1\" (1.549 m)    Wt 224 lb (101.6 kg)    Breastfeeding No    BMI 42.32 kg/m2     Here today for injection of ceftriaxone per dr Frannie Greene. Patient tolerated well.

## 2017-11-29 ENCOUNTER — HOSPITAL ENCOUNTER (EMERGENCY)
Age: 32
Discharge: HOME | End: 2017-11-29
Attending: EMERGENCY MEDICINE
Payer: SELF-PAY

## 2017-11-29 VITALS
SYSTOLIC BLOOD PRESSURE: 132 MMHG | OXYGEN SATURATION: 99 % | HEART RATE: 97 BPM | HEIGHT: 61 IN | RESPIRATION RATE: 16 BRPM | TEMPERATURE: 98.4 F | DIASTOLIC BLOOD PRESSURE: 82 MMHG | BODY MASS INDEX: 40.59 KG/M2 | WEIGHT: 215 LBS

## 2017-11-29 DIAGNOSIS — J02.9 ACUTE PHARYNGITIS, UNSPECIFIED ETIOLOGY: Primary | ICD-10-CM

## 2017-11-29 LAB — DEPRECATED S PYO AG THROAT QL EIA: NEGATIVE

## 2017-11-29 PROCEDURE — 87147 CULTURE TYPE IMMUNOLOGIC: CPT | Performed by: EMERGENCY MEDICINE

## 2017-11-29 PROCEDURE — 87070 CULTURE OTHR SPECIMN AEROBIC: CPT | Performed by: EMERGENCY MEDICINE

## 2017-11-29 PROCEDURE — 99283 EMERGENCY DEPT VISIT LOW MDM: CPT

## 2017-11-29 PROCEDURE — 74011250637 HC RX REV CODE- 250/637: Performed by: EMERGENCY MEDICINE

## 2017-11-29 PROCEDURE — 87880 STREP A ASSAY W/OPTIC: CPT | Performed by: EMERGENCY MEDICINE

## 2017-11-29 RX ORDER — IBUPROFEN 800 MG/1
800 TABLET ORAL
Qty: 20 TAB | Refills: 0 | Status: SHIPPED | OUTPATIENT
Start: 2017-11-29 | End: 2017-12-06

## 2017-11-29 RX ORDER — IBUPROFEN 800 MG/1
800 TABLET ORAL
Status: COMPLETED | OUTPATIENT
Start: 2017-11-29 | End: 2017-11-29

## 2017-11-29 RX ORDER — DEXAMETHASONE SODIUM PHOSPHATE 10 MG/ML
10 INJECTION INTRAMUSCULAR; INTRAVENOUS
Status: COMPLETED | OUTPATIENT
Start: 2017-11-29 | End: 2017-11-29

## 2017-11-29 RX ADMIN — IBUPROFEN 800 MG: 800 TABLET ORAL at 11:02

## 2017-11-29 RX ADMIN — DEXAMETHASONE SODIUM PHOSPHATE 10 MG: 10 INJECTION, SOLUTION INTRAMUSCULAR; INTRAVENOUS at 11:40

## 2017-11-29 NOTE — ED PROVIDER NOTES
HPI Comments: 66-year-old female with history of hypertension, diabetes who presents with sore throat for the past couple days. States that for the past week she's been sick with some nasal congestion and mild sore throat but 2 days ago acutely worsen. She's had fever at home with a MAXIMUM TEMPERATURE of 104. She's denied any nausea, vomiting, diarrhea. She's also had chills. She works at a  and many of the children have been sick, including with strep. Patient is a 27 y.o. female presenting with sore throat. Sore Throat    Associated symptoms include ear pain. Pertinent negatives include no vomiting, no headaches and no shortness of breath. Past Medical History:   Diagnosis Date    Anemia     Depression 2013    Diabetes ([address]     Essential hypertension     Hypertension     IBS (irritable bowel syndrome)        Past Surgical History:   Procedure Laterality Date     DELIVERY ONLY      TWins at 28 wks; IOL; NRFS         Family History:   Problem Relation Age of Onset    Hypertension Mother     Bleeding Prob Mother      blood clots    Thyroid Disease Mother     Diabetes Father    [facility] Elevated Lipids Father     Breast Cancer Paternal Aunt      breast    Cancer Maternal Grandmother      cervix    Uterine Cancer Maternal Grandmother 66     Hysterectomy and chemo    Colon Cancer Neg Hx     Ovarian Cancer Neg Hx        Social History     Social History    Marital status: SINGLE     Spouse name: N/A    Number of children: N/A    Years of education: N/A     Occupational History    Not on file. Social History Main Topics    Smoking status: Never Smoker    Smokeless tobacco: Never Used    Alcohol use Yes      Comment: occas    Drug use: No    Sexual activity: Yes     Partners: Male     Birth control/ protection: None      Comment: KJ;       Other Topics Concern    Not on file     Social History Narrative         ALLERGIES: Pcn [penicillins]    Review of Systems Constitutional: Positive for chills and fever. HENT: Positive for ear pain and sore throat. Eyes: Negative for pain. Respiratory: Negative for chest tightness and shortness of breath. Cardiovascular: Negative for chest pain and leg swelling. Gastrointestinal: Negative for abdominal pain, nausea and vomiting. Genitourinary: Negative for dysuria and flank pain. Musculoskeletal: Negative for back pain. Skin: Negative for rash. Neurological: Negative for headaches. All other systems reviewed and are negative. Vitals:    11/29/17 1046   BP: 132/82   Pulse: 97   Resp: 16   Temp: 98.4 °F (36.9 °C)   SpO2: 99%   Weight: 97.5 kg (215 lb)   Height: 5' 1\" (1.549 m)            Physical Exam   Constitutional: She appears well-developed and well-nourished. HENT:   Head: Normocephalic and atraumatic. Right Ear: Tympanic membrane normal.   Left Ear: Tympanic membrane normal.   Nose: Rhinorrhea present. Mouth/Throat: Oropharyngeal exudate present. Eyes: Conjunctivae and EOM are normal. Pupils are equal, round, and reactive to light. No scleral icterus. Neck: Neck supple. No tracheal deviation present. Cardiovascular: Normal rate, regular rhythm, normal heart sounds and intact distal pulses. Pulmonary/Chest: Effort normal and breath sounds normal. No respiratory distress. Abdominal: Soft. She exhibits no distension. There is no tenderness. There is no rebound and no guarding. Genitourinary:   Genitourinary Comments: deferred   Musculoskeletal: She exhibits no edema. Lymphadenopathy:     She has cervical adenopathy (tender). Neurological: She is alert. Skin: Skin is warm and dry. Psychiatric: She has a normal mood and affect. Nursing note and vitals reviewed. MDM  Number of Diagnoses or Management Options  Acute pharyngitis, unspecified etiology:   Diagnosis management comments: 77-year-old female presents with 4/4 center criteria.  Recommendations currently suggest treating based on strep test. Will obtain throat swab and culture. Rapid strep negative. Throat culture sent. Treated with p.o. Decadron.     Plan: Followup with PCP, return to the emergency department for new or worsening symptoms    Patient Progress  Patient progress: stable    ED Course       Procedures

## 2017-11-29 NOTE — DISCHARGE INSTRUCTIONS
Sore Throat: Care Instructions  Your Care Instructions    Infection by bacteria or a virus causes most sore throats. Cigarette smoke, dry air, air pollution, allergies, and yelling can also cause a sore throat. Sore throats can be painful and annoying. Fortunately, most sore throats go away on their own. If you have a bacterial infection, your doctor may prescribe antibiotics. Follow-up care is a key part of your treatment and safety. Be sure to make and go to all appointments, and call your doctor if you are having problems. It's also a good idea to know your test results and keep a list of the medicines you take. How can you care for yourself at home? · If your doctor prescribed antibiotics, take them as directed. Do not stop taking them just because you feel better. You need to take the full course of antibiotics. · Gargle with warm salt water once an hour to help reduce swelling and relieve discomfort. Use 1 teaspoon of salt mixed in 1 cup of warm water. · Take an over-the-counter pain medicine, such as acetaminophen (Tylenol), ibuprofen (Advil, Motrin), or naproxen (Aleve). Read and follow all instructions on the label. · Be careful when taking over-the-counter cold or flu medicines and Tylenol at the same time. Many of these medicines have acetaminophen, which is Tylenol. Read the labels to make sure that you are not taking more than the recommended dose. Too much acetaminophen (Tylenol) can be harmful. · Drink plenty of fluids. Fluids may help soothe an irritated throat. Hot fluids, such as tea or soup, may help decrease throat pain. · Use over-the-counter throat lozenges to soothe pain. Regular cough drops or hard candy may also help. These should not be given to young children because of the risk of choking. · Do not smoke or allow others to smoke around you. If you need help quitting, talk to your doctor about stop-smoking programs and medicines.  These can increase your chances of quitting for good. · Use a vaporizer or humidifier to add moisture to your bedroom. Follow the directions for cleaning the machine. When should you call for help? Call your doctor now or seek immediate medical care if:  ? · You have new or worse trouble swallowing. ? · Your sore throat gets much worse on one side. ? Watch closely for changes in your health, and be sure to contact your doctor if you do not get better as expected. Where can you learn more? Go to http://salvatore-elton.info/. Enter [id number] in the search box to learn more about \"Sore Throat: Care Instructions. \"  Current as of: May 12, 2017  Content Version: 11.4  © 9747-2528 Healthwise, Incorporated. Care instructions adapted under license by [employer] (which disclaims liability or warranty for this information). If you have questions about a medical condition or this instruction, always ask your healthcare professional. [address] any warranty or liability for your use of this information.

## 2017-12-01 LAB
BACTERIA SPEC CULT: ABNORMAL
BACTERIA SPEC CULT: ABNORMAL
SERVICE CMNT-IMP: ABNORMAL

## 2018-01-10 ENCOUNTER — OFFICE VISIT (OUTPATIENT)
Dept: FAMILY MEDICINE CLINIC | Age: 33
End: 2018-01-10

## 2018-01-10 DIAGNOSIS — I10 ESSENTIAL HYPERTENSION: Primary | ICD-10-CM

## 2018-01-10 DIAGNOSIS — J02.0 STREP PHARYNGITIS: Primary | ICD-10-CM

## 2018-01-10 DIAGNOSIS — R73.03 PRE-DIABETES: ICD-10-CM

## 2018-01-10 DIAGNOSIS — I10 ESSENTIAL HYPERTENSION: ICD-10-CM

## 2018-01-10 NOTE — PROGRESS NOTES
Patient seen in the office on 1/10/18. Labs ordered.      Orders Placed This Encounter    HEMOGLOBIN A1C WITH EAG    METABOLIC PANEL, BASIC

## 2018-01-11 VITALS
TEMPERATURE: 97.5 F | HEIGHT: 61 IN | DIASTOLIC BLOOD PRESSURE: 72 MMHG | OXYGEN SATURATION: 98 % | SYSTOLIC BLOOD PRESSURE: 108 MMHG | WEIGHT: 213 LBS | HEART RATE: 94 BPM | RESPIRATION RATE: 18 BRPM | BODY MASS INDEX: 40.22 KG/M2

## 2018-01-11 LAB
BUN SERPL-MCNC: 8 MG/DL (ref 6–20)
BUN/CREAT SERPL: 14 (ref 9–23)
CALCIUM SERPL-MCNC: 9.3 MG/DL (ref 8.7–10.2)
CHLORIDE SERPL-SCNC: 100 MMOL/L (ref 96–106)
CO2 SERPL-SCNC: 25 MMOL/L (ref 18–29)
CREAT SERPL-MCNC: 0.59 MG/DL (ref 0.57–1)
EST. AVERAGE GLUCOSE BLD GHB EST-MCNC: 117 MG/DL
GLUCOSE SERPL-MCNC: 84 MG/DL (ref 65–99)
HBA1C MFR BLD: 5.7 % (ref 4.8–5.6)
POTASSIUM SERPL-SCNC: 4.2 MMOL/L (ref 3.5–5.2)
SODIUM SERPL-SCNC: 140 MMOL/L (ref 134–144)

## 2018-01-11 NOTE — PROGRESS NOTES
Identified pt with two pt identifiers(name and ). Chief Complaint   Patient presents with    Sore Throat     began . Was Hospitalized for Tonsilitis that went septic. [address] Maintenance Due   Topic    Influenza Age 5 to Adult        Wt Readings from Last 3 Encounters:   01/10/18 213 lb (96.6 kg)   17 215 lb (97.5 kg)   17 224 lb (101.6 kg)     Temp Readings from Last 3 Encounters:   01/10/18 97.5 °F (36.4 °C) (Oral)   17 98.4 °F (36.9 °C)   17 98.7 °F (37.1 °C)     BP Readings from Last 3 Encounters:   01/10/18 108/72   17 132/82   17 132/84     Pulse Readings from Last 3 Encounters:   01/10/18 94   17 97   17 92         Learning Assessment:  :     Learning Assessment 2016   PRIMARY LEARNER Patient   HIGHEST LEVEL OF EDUCATION - PRIMARY LEARNER  [employer]   BARRIERS PRIMARY LEARNER NONE   CO-LEARNER CAREGIVER No   PRIMARY LANGUAGE ENGLISH   LEARNER PREFERENCE PRIMARY DEMONSTRATION   ANSWERED BY patient   RELATIONSHIP SELF       Depression Screening:  :     No flowsheet data found. Fall Risk Assessment:  :     No flowsheet data found. Abuse Screening:  :     No flowsheet data found. Coordination of Care Questionnaire:  :     1) Have you been to an emergency room, urgent care clinic since your last visit? yes [facility] in Dec for Sepsis   Hospitalized since your last visit? yes             2) Have you seen or consulted any other health care providers outside of [address] [place] [address] since your last visit? no  (Include any pap smears or colon screenings in this section.)    3) Do you have an Advance Directive on file? no  Are you interested in receiving information about Advance Directives? no    Reviewed record in preparation for visit and have obtained necessary documentation. Medication reconciliation up to date and corrected with patient at this time.

## 2018-01-31 NOTE — PROGRESS NOTES
Subjective:      Scarlet Humphreys is a 27 y.o. female here with complaint of sore throat x 3 days. Associated with suspected fevers at home. She reports admission at [facility] in December 2017 for sepsis secondary to tonsillitis. She is employed at a  center for which there has been strep pharyngitis. Appetite is decreased, but she is drinking fine. She has been doing warm saltwater gargles and taking ibuprofen for pain. Essential hypertension:     Prediabetes: last hemoglobin A1c 6.3 % (12/2016) for which she was started on metformin therapy. Reports compliance with medication. She checks her blood glucose three times per day. Current Outpatient Prescriptions   Medication Sig Dispense Refill    hydroCHLOROthiazide (HYDRODIURIL) 12.5 mg tablet Take 1 Tab by mouth daily. 90 Tab 1    glucose blood VI test strips (FREESTYLE LITE STRIPS) strip Check fasting blood glucose daily. 100 Strip 2    Blood-Glucose Meter monitoring kit Check fasting blood glucose daily as directed. Please dispense Freestyle Lite meter 1 Kit 0    Lancets misc Test fasting blood glucose daily. 100 Each 2    metFORMIN (GLUCOPHAGE) 500 mg tablet Take 1 Tab by mouth daily (with dinner). 90 Tab 1       Allergies   Allergen Reactions    Pcn [Penicillins] Hives       Past Medical History:   Diagnosis Date    Anemia     Depression 5/20/2013    Diabetes ([address]     Essential hypertension     Hypertension     IBS (irritable bowel syndrome)        Social History   Substance Use Topics    Smoking status: Never Smoker    Smokeless tobacco: Never Used    Alcohol use Yes      Comment: occas        Review of Systems  Pertinent items are noted in HPI.      Objective:     Visit Vitals    /72 (BP 1 Location: Right arm, BP Patient Position: Sitting)    Pulse 94    Temp 97.5 °F (36.4 °C) (Oral)    Resp 18    Ht 5' 1\" (1.549 m)    Wt 213 lb (96.6 kg)    SpO2 98%    BMI 40.25 kg/m2      General appearance - alert, well appearing, no acute distress   Eyes - pupils equal and reactive, extraocular eye movements intact, sclera anicteric  Ears - bilateral TMs normal    Mouth - mucous membranes moist, tonsils hypertrophied with pustular exudate  Chest - clear to auscultation bilaterally , no wheeze, rales, or rhonchi, symmetric air movement, respirations are unlabored   Heart - normal rate, regular rhythm, normal S1, S2, no murmurs, rubs, clicks or gallops    Labs: rapid GAS testing positive     Assessment/Plan:   Jennyfer Sotelo is a 27 y.o. female seen for:     1. Strep pharyngitis: antibiotics per orders. Warm saltwater gargles, OTC analgesic of choice, change toothbrush in 48 hours. 2. Essential hypertension: controlled, continue with current medication. 3. Pre-diabetes: continue with metformin. Check BMP and HgA1c. I have discussed the diagnosis with the patient and the intended plan as seen in the above orders. The patient has received an after-visit summary and questions were answered concerning future plans. I have discussed medication side effects and warnings with the patient as well. Patient verbalizes understanding of plan of care and denies further questions or concerns at this time. Informed patient to return to the office if symptoms worsen or if new symptoms arise.     Follow-up Disposition: Not on File

## 2018-02-18 ENCOUNTER — HOSPITAL ENCOUNTER (EMERGENCY)
Age: 33
Discharge: HOME | End: 2018-02-18
Attending: EMERGENCY MEDICINE
Payer: SELF-PAY

## 2018-02-18 VITALS
TEMPERATURE: 97.9 F | SYSTOLIC BLOOD PRESSURE: 156 MMHG | OXYGEN SATURATION: 97 % | WEIGHT: 220.9 LBS | DIASTOLIC BLOOD PRESSURE: 98 MMHG | BODY MASS INDEX: 41.71 KG/M2 | HEIGHT: 61 IN | RESPIRATION RATE: 18 BRPM | HEART RATE: 100 BPM

## 2018-02-18 DIAGNOSIS — J03.01 ACUTE RECURRENT STREPTOCOCCAL TONSILLITIS: Primary | ICD-10-CM

## 2018-02-18 LAB
DEPRECATED S PYO AG THROAT QL EIA: POSITIVE
FLUAV AG NPH QL IA: NEGATIVE
FLUBV AG NOSE QL IA: NEGATIVE

## 2018-02-18 PROCEDURE — 74011250636 HC RX REV CODE- 250/636: Performed by: EMERGENCY MEDICINE

## 2018-02-18 PROCEDURE — 74011250637 HC RX REV CODE- 250/637: Performed by: EMERGENCY MEDICINE

## 2018-02-18 PROCEDURE — 87880 STREP A ASSAY W/OPTIC: CPT | Performed by: EMERGENCY MEDICINE

## 2018-02-18 PROCEDURE — 87804 INFLUENZA ASSAY W/OPTIC: CPT | Performed by: EMERGENCY MEDICINE

## 2018-02-18 PROCEDURE — 99283 EMERGENCY DEPT VISIT LOW MDM: CPT

## 2018-02-18 RX ORDER — CEPHALEXIN 500 MG/1
500 CAPSULE ORAL 4 TIMES DAILY
Qty: 40 CAP | Refills: 0 | Status: SHIPPED | OUTPATIENT
Start: 2018-02-18 | End: 2018-02-28

## 2018-02-18 RX ORDER — CEPHALEXIN 250 MG/1
500 CAPSULE ORAL
Status: COMPLETED | OUTPATIENT
Start: 2018-02-18 | End: 2018-02-18

## 2018-02-18 RX ORDER — TRIPROLIDINE/PSEUDOEPHEDRINE 2.5MG-60MG
600 TABLET ORAL
Status: COMPLETED | OUTPATIENT
Start: 2018-02-18 | End: 2018-02-18

## 2018-02-18 RX ORDER — DEXAMETHASONE 4 MG/1
10 TABLET ORAL
Status: COMPLETED | OUTPATIENT
Start: 2018-02-18 | End: 2018-02-18

## 2018-02-18 RX ADMIN — IBUPROFEN 600 MG: 100 SUSPENSION ORAL at 04:45

## 2018-02-18 RX ADMIN — DEXAMETHASONE 10 MG: 4 TABLET ORAL at 05:25

## 2018-02-18 RX ADMIN — CEPHALEXIN 500 MG: 250 CAPSULE ORAL at 05:23

## 2018-02-18 NOTE — ED TRIAGE NOTES
Pt rpts fever/chills, bodys aches and sore throat with non-productive cough since Friday.  + nasal congestion. Sister with same symptoms.

## 2018-02-18 NOTE — DISCHARGE INSTRUCTIONS
Tonsillitis: Care Instructions  Your Care Instructions    Tonsillitis is an infection of the tonsils that is caused by bacteria or a virus. The tonsils are in the back of the throat and are part of the immune system. Tonsillitis typically lasts from a few days up to a couple of weeks. Tonsillitis caused by a virus goes away on its own. Tonsillitis caused by the bacteria that causes strep throat is treated with antibiotics. You and your doctor may consider surgery to remove the tonsils (tonsillectomy) if you have serious complications or repeat infections. Follow-up care is a key part of your treatment and safety. Be sure to make and go to all appointments, and call your doctor if you are having problems. It's also a good idea to know your test results and keep a list of the medicines you take. How can you care for yourself at home? · If your doctor prescribed antibiotics, take them as directed. Do not stop taking them just because you feel better. You need to take the full course of antibiotics. · Gargle with warm salt water. This helps reduce swelling and relieve discomfort. Gargle once an hour with 1 teaspoon of salt mixed in 8 fluid ounces of warm water. · Take an over-the-counter pain medicine, such as acetaminophen (Tylenol), ibuprofen (Advil, Motrin), or naproxen (Aleve). Be safe with medicines. Read and follow all instructions on the label. No one younger than 20 should take aspirin. It has been linked to Reye syndrome, a serious illness. · Be careful when taking over-the-counter cold or flu medicines and Tylenol at the same time. Many of these medicines have acetaminophen, which is Tylenol. Read the labels to make sure that you are not taking more than the recommended dose. Too much acetaminophen (Tylenol) can be harmful. · Try an over-the-counter throat spray to relieve throat pain. · Drink plenty of fluids. Fluids may help soothe an irritated throat.  Drink warm or cool liquids (whichever feels better). These include tea, soup, and juice. · Do not smoke, and avoid secondhand smoke. Smoking can make tonsillitis worse. If you need help quitting, talk to your doctor about stop-smoking programs and medicines. These can increase your chances of quitting for good. · Use a vaporizer or humidifier to add moisture to your bedroom. Follow the directions for cleaning the machine. When should you call for help? Call your doctor now or seek immediate medical care if:  ? · Your pain gets worse on one side of your throat. ? · You have a new or higher fever. ? · You notice changes in your voice. ? · You have trouble opening your mouth. ? · You have any trouble breathing. ? · You have much more trouble swallowing. ? · You have a fever with a stiff neck or a severe headache. ? · You are sensitive to light or feel very sleepy or confused. ? Watch closely for changes in your health, and be sure to contact your doctor if:  ? · You do not get better after 2 days. Where can you learn more? Go to http://salvatore-elton.info/. Enter L307 in the search box to learn more about \"Tonsillitis: Care Instructions. \"  Current as of: May 12, 2017  Content Version: 11.4  © 8836-5279 Healthwise, Incorporated. Care instructions adapted under license by [employer] (which disclaims liability or warranty for this information). If you have questions about a medical condition or this instruction, always ask your healthcare professional. [place] [address] any warranty or liability for your use of this information.

## 2018-02-18 NOTE — ED PROVIDER NOTES
HPI Comments: This patient presents with about 48 hours of subjective fever, chills, body aches, cough productive of yellow sputum, and sore throat. She has a history of hypertension and diabetes. She is here with her sister who has the same symptoms. 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 The patient received a flu vaccination. No vomiting or diarrhea. No abdominal pain. No shortness of breath or wheezing. No chest pain. She tried Tylenol 90 minutes prior to coming in but couldn't get it down because of the pain in her throat. She has a history of enlarged tonsils. She also has a history of tonsillitis. She tells me that in the fall, her tonsillitis got so bad that she was admitted to [facility] for one month. Old chart reviewed - last seen for pharyngitis in 2017    Patient is a 26 y.o. female presenting with cough, fever, and sore throat. Cough   Associated symptoms include sore throat. Fever    Associated symptoms include sore throat and cough. Sore Throat    Associated symptoms include cough. Past Medical History:   Diagnosis Date    Anemia     Depression 2013    Diabetes ([address]     Essential hypertension     Hypertension     IBS (irritable bowel syndrome)        Past Surgical History:   Procedure Laterality Date     DELIVERY ONLY      TWins at 28 wks; IOL; NRFS         Family History:   Problem Relation Age of Onset    Hypertension Mother     Bleeding Prob Mother      blood clots    Thyroid Disease Mother     Diabetes Father    [facility]obby [facility]ia Elevated Lipids Father     Breast Cancer Paternal Aunt      breast    Cancer Maternal Grandmother      cervix    Uterine Cancer Maternal Grandmother 66     Hysterectomy and chemo    Colon Cancer Neg Hx     Ovarian Cancer Neg Hx        Social History     Social History    Marital status: SINGLE     Spouse name: N/A    Number of children: N/A    Years of education: N/A     Occupational History    Not on file. Social History Main Topics    Smoking status: Never Smoker    Smokeless tobacco: Never Used    Alcohol use Yes      Comment: occas    Drug use: No    Sexual activity: Yes     Partners: Male     Birth control/ protection: None      Comment: KJ; Other Topics Concern    Not on file     Social History Narrative         ALLERGIES: Morphine and Pcn [penicillins]    Review of Systems   Constitutional: Positive for fever. HENT: Positive for sore throat. Respiratory: Positive for cough. Vitals:    02/18/18 0416   BP: 141/88   Pulse: (!) 107   Resp: 18   Temp: 97.9 °F (36.6 °C)   SpO2: 98%   Weight: 100.2 kg (220 lb 14.4 oz)   Height: 5' 1\" (1.549 m)            Physical Exam   Constitutional: She appears well-developed and well-nourished. No distress. HENT:   Head: Normocephalic and atraumatic. Right Ear: Tympanic membrane, external ear and ear canal normal.   Left Ear: Tympanic membrane, external ear and ear canal normal.   Nose: Nose normal.   No drooling. Normal voice. Can handle secretions. Tonsils almost kissing. Eyes: Conjunctivae are normal. Pupils are equal, round, and reactive to light. Neck: Normal range of motion. Neck supple. Cardiovascular: Regular rhythm, normal heart sounds and intact distal pulses. Tachycardia present. Pulmonary/Chest: Effort normal and breath sounds normal.   Abdominal: Soft. She exhibits no distension. There is no tenderness. There is no rebound. Musculoskeletal: She exhibits no edema. Lymphadenopathy:     She has cervical adenopathy (shotty anterior). Neurological: She is alert. Skin: Skin is warm and dry. She is not diaphoretic. Psychiatric: She has a normal mood and affect.         [facility]      ED Course       Procedures      Labs Reviewed   STREP AG SCREEN, GROUP A - Abnormal; Notable for the following:        Result Value    Group A Strep Ag ID POSITIVE (*)     All other components within normal limits   INFLUENZA A & B AG (RAPID TEST) Plan  Keflex  One dose dex  Told her blood sugar may go up  Offered magic mouthwash like sister but she declines. Return to hospital-based ED if she worsens like in December.   No signs of PTA today

## 2018-02-18 NOTE — ED NOTES
Dr. Prak Trinh discharged patient. Patient's sister at bedside. Patient discharged ambulatory, stable, in no acute distress.

## 2018-02-18 NOTE — LETTER
[facility] EMERGENCY DEPT 
[address] [facility] [address] [id number] 
[phone] Work/School Note Date: 2/18/2018 To Whom It May concern: 
 
Kamryn Mcclendon was seen and treated today in the emergency room by the following provider(s): 
Attending Provider: Mary Michelle DO. Kamryn Mcclendon may return to work on 2-20-18.  
 
Sincerely, 
 
 
 
 
Mary Michelle DO

## 2018-02-18 NOTE — ED NOTES
Purposeful rounding completed. Patient sitting up in bed with Dr. Jaswinder Dougherty at bedside. Patient reported that pain has decreased.

## 2018-07-18 ENCOUNTER — HOSPITAL ENCOUNTER (EMERGENCY)
Age: 33
Discharge: HOME | End: 2018-07-18
Attending: EMERGENCY MEDICINE
Payer: SUBSIDIZED

## 2018-07-18 VITALS
TEMPERATURE: 99 F | HEART RATE: 84 BPM | RESPIRATION RATE: 16 BRPM | DIASTOLIC BLOOD PRESSURE: 95 MMHG | HEIGHT: 61 IN | WEIGHT: 232.81 LBS | OXYGEN SATURATION: 99 % | BODY MASS INDEX: 43.95 KG/M2 | SYSTOLIC BLOOD PRESSURE: 169 MMHG

## 2018-07-18 DIAGNOSIS — J01.90 ACUTE NON-RECURRENT SINUSITIS, UNSPECIFIED LOCATION: Primary | ICD-10-CM

## 2018-07-18 DIAGNOSIS — I10 ESSENTIAL HYPERTENSION: ICD-10-CM

## 2018-07-18 DIAGNOSIS — K02.9 DENTAL CARIES: ICD-10-CM

## 2018-07-18 PROCEDURE — 74011250636 HC RX REV CODE- 250/636: Performed by: EMERGENCY MEDICINE

## 2018-07-18 PROCEDURE — 99283 EMERGENCY DEPT VISIT LOW MDM: CPT

## 2018-07-18 PROCEDURE — 96372 THER/PROPH/DIAG INJ SC/IM: CPT

## 2018-07-18 RX ORDER — NAPROXEN 500 MG/1
500 TABLET ORAL 2 TIMES DAILY WITH MEALS
Qty: 20 TAB | Refills: 0 | Status: SHIPPED | OUTPATIENT
Start: 2018-07-18 | End: 2018-09-12 | Stop reason: SDUPTHER

## 2018-07-18 RX ORDER — KETOROLAC TROMETHAMINE 30 MG/ML
INJECTION, SOLUTION INTRAMUSCULAR; INTRAVENOUS
Status: DISPENSED
Start: 2018-07-18 | End: 2018-07-18

## 2018-07-18 RX ORDER — KETOROLAC TROMETHAMINE 30 MG/ML
60 INJECTION, SOLUTION INTRAMUSCULAR; INTRAVENOUS
Status: COMPLETED | OUTPATIENT
Start: 2018-07-18 | End: 2018-07-18

## 2018-07-18 RX ORDER — AZITHROMYCIN 250 MG/1
TABLET, FILM COATED ORAL
Qty: 6 TAB | Refills: 0 | Status: SHIPPED | OUTPATIENT
Start: 2018-07-18 | End: 2018-07-23

## 2018-07-18 RX ORDER — CETIRIZINE HCL 10 MG
10 TABLET ORAL DAILY
Qty: 10 TAB | Refills: 0 | Status: SHIPPED | OUTPATIENT
Start: 2018-07-18 | End: 2018-09-04

## 2018-07-18 RX ADMIN — KETOROLAC TROMETHAMINE 60 MG: 30 INJECTION, SOLUTION INTRAMUSCULAR; INTRAVENOUS at 04:33

## 2018-07-18 NOTE — DISCHARGE INSTRUCTIONS
Sinusitis: Care Instructions  Your Care Instructions    Sinusitis is an infection of the lining of the sinus cavities in your head. Sinusitis often follows a cold. It causes pain and pressure in your head and face. In most cases, sinusitis gets better on its own in 1 to 2 weeks. But some mild symptoms may last for several weeks. Sometimes antibiotics are needed. Follow-up care is a key part of your treatment and safety. Be sure to make and go to all appointments, and call your doctor if you are having problems. It's also a good idea to know your test results and keep a list of the medicines you take. How can you care for yourself at home? · Take an over-the-counter pain medicine, such as acetaminophen (Tylenol), ibuprofen (Advil, Motrin), or naproxen (Aleve). Read and follow all instructions on the label. · If the doctor prescribed antibiotics, take them as directed. Do not stop taking them just because you feel better. You need to take the full course of antibiotics. · Be careful when taking over-the-counter cold or flu medicines and Tylenol at the same time. Many of these medicines have acetaminophen, which is Tylenol. Read the labels to make sure that you are not taking more than the recommended dose. Too much acetaminophen (Tylenol) can be harmful. · Breathe warm, moist air from a steamy shower, a hot bath, or a sink filled with hot water. Avoid cold, dry air. Using a humidifier in your home may help. Follow the directions for cleaning the machine. · Use saline (saltwater) nasal washes to help keep your nasal passages open and wash out mucus and bacteria. You can buy saline nose drops at a grocery store or drugstore. Or you can make your own at home by adding 1 teaspoon of salt and 1 teaspoon of baking soda to 2 cups of distilled water. If you make your own, fill a bulb syringe with the solution, insert the tip into your nostril, and squeeze gently. Trish Almendareza your nose.   · Put a hot, wet towel or a warm gel pack on your face 3 or 4 times a day for 5 to 10 minutes each time. · Try a decongestant nasal spray like oxymetazoline (Afrin). Do not use it for more than 3 days in a row. Using it for more than 3 days can make your congestion worse. When should you call for help? Call your doctor now or seek immediate medical care if:    · You have new or worse swelling or redness in your face or around your eyes.     · You have a new or higher fever.    Watch closely for changes in your health, and be sure to contact your doctor if:    · You have new or worse facial pain.     · The mucus from your nose becomes thicker (like pus) or has new blood in it.     · You are not getting better as expected. Where can you learn more? Go to http://salvatore-elton.info/. Enter C986 in the search box to learn more about \"Sinusitis: Care Instructions. \"  Current as of: May 12, 2017  Content Version: 11.7  © 5100-5140 [employer]. Care instructions adapted under license by [employer] (which disclaims liability or warranty for this information). If you have questions about a medical condition or this instruction, always ask your healthcare professional. [address] any warranty or liability for your use of this information.

## 2018-07-18 NOTE — ED PROVIDER NOTES
HPI Comments: The patient is a 33-year-old female, with a past medical history significant for hypertension, anemia, depression, diabetes, IBS, who presents to the ED with a complaint of left facial pain that began approximately 8 hours ago, dull, and throbbing in nature, severity 8/10, without any aggravating or relieving factors. The patient also admits to congestion and runny nose. She denies any fever, blurred vision, neck pain, chest pain, shortness of breath, nausea, vomiting, diarrhea, constipation, dizziness, weakness, and numbness. Patient is a 26 y.o. female presenting with sinus pain. Sinus Pain           Past Medical History:   Diagnosis Date    Anemia     Depression 2013    Diabetes [address] [address]     Essential hypertension     Hypertension     IBS (irritable bowel syndrome)        Past Surgical History:   Procedure Laterality Date     DELIVERY ONLY      TWins at 28 wks; IOL; NRFS         Family History:   Problem Relation Age of Onset    Hypertension Mother     Bleeding Prob Mother      blood clots    Thyroid Disease Mother     Diabetes Father    [facility] Elevated Lipids Father     Breast Cancer Paternal Aunt      breast    Cancer Maternal Grandmother      cervix    Uterine Cancer Maternal Grandmother 66     Hysterectomy and chemo    Colon Cancer Neg Hx     Ovarian Cancer Neg Hx        Social History     Social History    Marital status: SINGLE     Spouse name: N/A    Number of children: N/A    Years of education: N/A     Occupational History    Not on file. Social History Main Topics    Smoking status: Never Smoker    Smokeless tobacco: Never Used    Alcohol use Yes      Comment: occas    Drug use: No    Sexual activity: Yes     Partners: Male     Birth control/ protection: None      Comment: KJ;       Other Topics Concern    Not on file     Social History Narrative         ALLERGIES: Morphine and Pcn [penicillins]    Review of Systems   HENT: Positive for sinus pain.    All other systems reviewed and are negative. Vitals:    07/18/18 0428   BP: (!) 169/95   Pulse: 84   Resp: 16   Temp: 99 °F (37.2 °C)   SpO2: 99%            Physical Exam   Nursing note and vitals reviewed. CONSTITUTIONAL: Well-appearing; well-nourished; in no apparent distress  HEAD: Normocephalic; atraumatic  EYES: PERRL; EOM intact; conjunctiva and sclera are clear bilaterally. ENT: Multiple fractured teeth and extensive dental caries without any gum and dental tenderness. Tenderness on palpation of the left frontal and maxillary sinuses. Nasal mucosal edema but no rhinorrhea; normal pharynx with no tonsillar hypertrophy; mucous membranes pink/moist, no erythema, no exudate. NECK: Supple; non-tender; no cervical lymphadenopathy  CARD: Normal S1, S2; no murmurs, rubs, or gallops. Regular rate and rhythm. RESP: Normal respiratory effort; breath sounds clear and equal bilaterally; no wheezes, rhonchi, or rales. ABD: Normal bowel sounds; non-distended; non-tender; no palpable organomegaly, no masses, no bruits. Back Exam: Normal inspection; no vertebral point tenderness, no CVA tenderness. Normal range of motion. EXT: Normal ROM in all four extremities; non-tender to palpation; no swelling or deformity; distal pulses are normal, no edema. SKIN: Warm; dry; no rash. NEURO:Alert and oriented x 3, coherent, PHAN-XII grossly intact, sensory and motor are non-focal.        MDM  Number of Diagnoses or Management Options  Diagnosis management comments: Assessment: Left facial pain-suspect sinus disease/ hypertension/ dental caries      Plan: Education and reassurance/ symptomatic treatment/ Monitor and Reevaluate.          Amount and/or Complexity of Data Reviewed  Clinical lab tests: ordered and reviewed  Tests in the radiology section of CPT®: ordered and reviewed  Tests in the medicine section of CPT®: reviewed and ordered  Discussion of test results with the performing providers: yes  Decide to obtain previous medical records or to obtain history from someone other than the patient: yes  Obtain history from someone other than the patient: yes  Review and summarize past medical records: yes  Discuss the patient with other providers: yes  Independent visualization of images, tracings, or specimens: yes    Risk of Complications, Morbidity, and/or Mortality  Presenting problems: moderate  Diagnostic procedures: moderate  Management options: moderate          ED Course       Procedures      Progress Note:   Pt has been reexamined by Bari Sheldon MD. Pt is feeling much better. Symptoms have improved. All available results have been reviewed with pt and any available family. Pt understands sx, dx, and tx in ED. Care plan has been outlined and questions have been answered. Pt is ready to go home. Will send home on HTN/ Sinusitis instruction. Prescription of Afrin, Claritin, Z-Kit and Naproxen . Outpatient referral with PCP as needed. Written by Bari Sheldon MD,4:42 AM    .   .

## 2018-08-30 ENCOUNTER — OFFICE VISIT (OUTPATIENT)
Dept: FAMILY MEDICINE CLINIC | Age: 33
End: 2018-08-30

## 2018-08-30 VITALS
HEART RATE: 80 BPM | HEIGHT: 61 IN | RESPIRATION RATE: 18 BRPM | SYSTOLIC BLOOD PRESSURE: 120 MMHG | TEMPERATURE: 98.1 F | BODY MASS INDEX: 43.8 KG/M2 | OXYGEN SATURATION: 98 % | DIASTOLIC BLOOD PRESSURE: 88 MMHG | WEIGHT: 232 LBS

## 2018-08-30 DIAGNOSIS — R10.32 BILATERAL LOWER ABDOMINAL DISCOMFORT: ICD-10-CM

## 2018-08-30 DIAGNOSIS — R30.0 DYSURIA: Primary | ICD-10-CM

## 2018-08-30 DIAGNOSIS — R10.31 BILATERAL LOWER ABDOMINAL DISCOMFORT: ICD-10-CM

## 2018-08-30 LAB
BILIRUB UR QL STRIP: NEGATIVE
GLUCOSE UR-MCNC: NEGATIVE MG/DL
KETONES P FAST UR STRIP-MCNC: NEGATIVE MG/DL
PH UR STRIP: 6.5 [PH] (ref 4.6–8)
PROT UR QL STRIP: NEGATIVE
SP GR UR STRIP: 1.02 (ref 1–1.03)
UA UROBILINOGEN AMB POC: ABNORMAL (ref 0.2–1)
URINALYSIS CLARITY POC: CLEAR
URINALYSIS COLOR POC: YELLOW
URINE BLOOD POC: ABNORMAL
URINE LEUKOCYTES POC: NEGATIVE
URINE NITRITES POC: NEGATIVE

## 2018-08-30 RX ORDER — SULFAMETHOXAZOLE AND TRIMETHOPRIM 800; 160 MG/1; MG/1
1 TABLET ORAL 2 TIMES DAILY
Qty: 10 TAB | Refills: 0 | Status: SHIPPED | OUTPATIENT
Start: 2018-08-30 | End: 2018-09-04

## 2018-08-30 NOTE — PATIENT INSTRUCTIONS

## 2018-08-30 NOTE — LETTER
NOTIFICATION RETURN TO WORK / SCHOOL 
 
8/30/2018 11:03 AM 
 
Ms. Davonte Castillo [address] [address] To Whom It May Concern: 
 
Davonte Castillo is currently under the care of [address] [place] [address]. She will return to work/school on: 8/31. 18. Please excuse her absence on 8/30/18. If there are questions or concerns please have the patient contact our office. Sincerely, Chuck Vallecillo MD

## 2018-08-30 NOTE — PROGRESS NOTES
Subjective:  
  
Sil Olivia is a 26 y.o. female here today with complaint of abdominal discomfort, urinary frequency, pressure after voiding. Onset was several days ago, gradually worsening since that time. Abdominal pain is intermittent sharp and is tender to the touch, non-radiating. Associated with nausea, reflux symptoms. Denies hematuria, hematochezia, changes in bowel habits, vomiting, fever, chills, vaginal itching, vaginal discharge. No one at home with similar symptoms. Has been taking Tylenol with some improvement noted. No LMP recorded. Patient is not currently having periods (Reason: IUD). She reports IUD check last year and normal. 
 
Current Outpatient Prescriptions Medication Sig Dispense Refill  naproxen (NAPROSYN) 500 mg tablet Take 1 Tab by mouth two (2) times daily (with meals). 20 Tab 0  
 cetirizine (ZYRTEC) 10 mg tablet Take 1 Tab by mouth daily. 10 Tab 0  
 hydroCHLOROthiazide (HYDRODIURIL) 12.5 mg tablet Take 1 Tab by mouth daily. 90 Tab 1  
 glucose blood VI test strips (FREESTYLE LITE STRIPS) strip Check fasting blood glucose daily. 100 Strip 2  Blood-Glucose Meter monitoring kit Check fasting blood glucose daily as directed. Please dispense Freestyle Lite meter 1 Kit 0  
 Lancets misc Test fasting blood glucose daily. 100 Each 2  
 metFORMIN (GLUCOPHAGE) 500 mg tablet Take 1 Tab by mouth daily (with dinner). 90 Tab 1 Allergies Allergen Reactions  Morphine Hives  Pcn [Penicillins] Hives Past Medical History:  
Diagnosis Date  Anemia  Depression 5/20/2013  Diabetes ([facility] [address]  Essential hypertension  Hypertension  IBS (irritable bowel syndrome) Social History Substance Use Topics  Smoking status: Never Smoker  Smokeless tobacco: Never Used  Alcohol use Yes Comment: occas Review of Systems Pertinent items are noted in HPI. Objective:  
 
Visit Vitals  /88 (BP 1 Location: Right arm, BP Patient Position: Sitting) Comment: Manual  
 Pulse 80  Temp 98.1 °F (36.7 °C) (Oral) Comment: .  Resp 18  Ht 5' 1\" (1.549 m)  Wt 232 lb (105.2 kg)  SpO2 98%  BMI 43.84 kg/m2 General appearance - alert, well appearing, and in no distress Chest - clear to auscultation, no wheezes, rales or rhonchi, symmetric air entry, no tachypnea, retractions or cyanosis Heart - normal rate, regular rhythm, normal S1, S2, no murmurs, rubs, clicks or gallops Abdomen - soft, nondistended, TTP along the bilateral lower quadrants and suprapubic area without rebound or guarding, no CVA tenderness, bowel sounds normal 
Pelvic - deferred Recent Results (from the past 12 hour(s)) AMB POC URINALYSIS DIP STICK AUTO W/O MICRO Collection Time: 08/30/18 10:47 AM  
Result Value Ref Range Color (UA POC) Yellow Clarity (UA POC) Clear Glucose (UA POC) Negative Negative Bilirubin (UA POC) Negative Negative Ketones (UA POC) Negative Negative Specific gravity (UA POC) 1.025 1.001 - 1.035 Blood (UA POC) Trace Negative pH (UA POC) 6.5 4.6 - 8.0 Protein (UA POC) Negative Negative Urobilinogen (UA POC) 0.2 mg/dL 0.2 - 1 Nitrites (UA POC) Negative Negative Leukocyte esterase (UA POC) Negative Negative Assessment/Plan:  
Chadwick January is a 26 y.o. female seen for: 1. Dysuria: UA with trace blood. Based upon history and exam, will start empiric treatment for UTI. Urine culture sent. Signs/symptoms that would warrant reevaluation discussed. - AMB POC URINALYSIS DIP STICK AUTO W/O MICRO 
- CULTURE, URINE 
- trimethoprim-sulfamethoxazole (BACTRIM DS, SEPTRA DS) 160-800 mg per tablet; Take 1 Tab by mouth two (2) times a day for 5 days. Dispense: 10 Tab; Refill: 0 
 
2. Bilateral lower abdominal discomfort: no alarming history. Likely related to above.   
 
I have discussed the diagnosis with the patient and the intended plan as seen in the above orders. The patient has received an after-visit summary and questions were answered concerning future plans. I have discussed medication side effects and warnings with the patient as well. Informed patient to return to the office if symptoms worsen or if new symptoms arise. Follow-up Disposition: 
Return if symptoms worsen or fail to improve.

## 2018-08-30 NOTE — MR AVS SNAPSHOT
Raquel Ugartebird 
 
 
 [address] [id number] Patient: Huma Oliva MRN: [id number] YIZ:[birth year] Visit Information Date & Time Provider Department Dept. Phone Encounter #  
 8/30/2018  9:45 AM Jourdan Bowens 108 [phone] [id number] Follow-up Instructions Return if symptoms worsen or fail to improve. Upcoming Health Maintenance Date Due Influenza Age 5 to Adult 9/1/2019* PAP AKA CERVICAL CYTOLOGY 7/5/2020 DTaP/Tdap/Td series (2 - Td) 11/27/2023 *Topic was postponed. The date shown is not the original due date. Allergies as of 8/30/2018  Review Complete On: 8/30/2018 By: Esther Carvajal MD  
  
 Severity Noted Reaction Type Reactions Morphine  02/18/2018    Hives Pcn [Penicillins]  12/13/2011   Side Effect Hives Current Immunizations  Reviewed on 9/9/2013 Name Date Influenza Vaccine 9/9/2013 MMR 1/6/2014 10:55 AM  
 TB Skin Test (PPD) Intradermal 10/14/2014, 4/30/2014, 4/1/2014 Tdap 11/27/2013 Not reviewed this visit You Were Diagnosed With   
  
 Codes Comments Dysuria    -  Primary ICD-10-CM: R30.0 ICD-9-CM: 788.1 Bilateral lower abdominal discomfort     ICD-10-CM: R10.31, R10.32 
ICD-9-CM: 789.03, 789.04 Vitals BP Pulse Temp Resp Height(growth percentile) Weight(growth percentile) 120/88 (BP 1 Location: Right arm, BP Patient Position: Sitting) 80 98.1 °F (36.7 °C) (Oral) 18 5' 1\" (1.549 m) 232 lb (105.2 kg) SpO2 BMI OB Status Smoking Status 98% 43.84 kg/m2 IUD Never Smoker Vitals History BMI and BSA Data Body Mass Index Body Surface Area  
 43.84 kg/m 2 2.13 m 2 Preferred Pharmacy Pharmacy Name Phone [id number] [address] [phone] Your Updated Medication List  
  
   
 This list is accurate as of 8/30/18 11:01 AM.  Always use your most recent med list.  
  
  
  
  
 Blood-Glucose Meter monitoring kit Check fasting blood glucose daily as directed. Please dispense Freestyle Lite meter  
  
 cetirizine 10 mg tablet Commonly known as:  ZyrTEC Take 1 Tab by mouth daily. glucose blood VI test strips strip Commonly known as:  FREESTYLE LITE STRIPS Check fasting blood glucose daily. hydroCHLOROthiazide 12.5 mg tablet Commonly known as:  HYDRODIURIL Take 1 Tab by mouth daily. Lancets Misc Test fasting blood glucose daily. metFORMIN 500 mg tablet Commonly known as:  GLUCOPHAGE Take 1 Tab by mouth daily (with dinner). naproxen 500 mg tablet Commonly known as:  NAPROSYN Take 1 Tab by mouth two (2) times daily (with meals). trimethoprim-sulfamethoxazole 160-800 mg per tablet Commonly known as:  BACTRIM DS, SEPTRA DS Take 1 Tab by mouth two (2) times a day for 5 days. Prescriptions Sent to Pharmacy Refills  
 trimethoprim-sulfamethoxazole (BACTRIM DS, SEPTRA DS) 160-800 mg per tablet 0 Sig: Take 1 Tab by mouth two (2) times a day for 5 days. Class: Normal  
 Pharmacy: [facility]/pharmacy [address], [address] [place] [address] Ph #: [phone] Route: Oral  
  
We Performed the Following AMB POC URINALYSIS DIP STICK AUTO W/O MICRO [34402 CPT(R)] CULTURE, URINE [id number] CPT(R)] Follow-up Instructions Return if symptoms worsen or fail to improve. Patient Instructions Abdominal Pain: Care Instructions Your Care Instructions Abdominal pain has many possible causes. Some aren't serious and get better on their own in a few days. Others need more testing and treatment. If your pain continues or gets worse, you need to be rechecked and may need more tests to find out what is wrong. You may need surgery to correct the problem. Don't ignore new symptoms, such as fever, nausea and vomiting, urination problems, pain that gets worse, and dizziness. These may be signs of a more serious problem. Your doctor may have recommended a follow-up visit in the next 8 to 12 hours. If you are not getting better, you may need more tests or treatment. The doctor has checked you carefully, but problems can develop later. If you notice any problems or new symptoms, get medical treatment right away. Follow-up care is a key part of your treatment and safety. Be sure to make and go to all appointments, and call your doctor if you are having problems. It's also a good idea to know your test results and keep a list of the medicines you take. How can you care for yourself at home? · Rest until you feel better. · To prevent dehydration, drink plenty of fluids, enough so that your urine is light yellow or clear like water. Choose water and other caffeine-free clear liquids until you feel better. If you have kidney, heart, or liver disease and have to limit fluids, talk with your doctor before you increase the amount of fluids you drink. · If your stomach is upset, eat mild foods, such as rice, dry toast or crackers, bananas, and applesauce. Try eating several small meals instead of two or three large ones. · Wait until 48 hours after all symptoms have gone away before you have spicy foods, alcohol, and drinks that contain caffeine. · Do not eat foods that are high in fat. · Avoid anti-inflammatory medicines such as aspirin, ibuprofen (Advil, Motrin), and naproxen (Aleve). These can cause stomach upset. Talk to your doctor if you take daily aspirin for another health problem. When should you call for help? Call 911 anytime you think you may need emergency care. For example, call if: 
  · You passed out (lost consciousness).  
  · You pass maroon or very bloody stools.  
  · You vomit blood or what looks like coffee grounds.   · You have new, severe belly pain.  
 Call your doctor now or seek immediate medical care if: 
  · Your pain gets worse, especially if it becomes focused in one area of your belly.  
  · You have a new or higher fever.  
  · Your stools are black and look like tar, or they have streaks of blood.  
  · You have unexpected vaginal bleeding.  
  · You have symptoms of a urinary tract infection. These may include: 
¨ Pain when you urinate. ¨ Urinating more often than usual. 
¨ Blood in your urine.  
  · You are dizzy or lightheaded, or you feel like you may faint.  
 Watch closely for changes in your health, and be sure to contact your doctor if: 
  · You are not getting better after 1 day (24 hours). Where can you learn more? Go to http://salvatore-elton.info/. Enter E434 in the search box to learn more about \"Abdominal Pain: Care Instructions. \" Current as of: November 20, 2017 Content Version: 11.7 © 2336-3341 [employer]. Care instructions adapted under license by [employer] (which disclaims liability or warranty for this information). If you have questions about a medical condition or this instruction, always ask your healthcare professional. [place] [address] any warranty or liability for your use of this information. Introducing [facility]! [facility] introduces [facility] [employer] patient portal. Now you can access parts of your medical record, email your doctor's office, and request medication refills online. 1. In your internet browser, go to [email or web address] 2. Click on the First Time User? Click Here link in the Sign In box. You will see the New Member Sign Up page. 3. Enter your [employer] Access Code exactly as it appears below. You will not need to use this code after youve completed the sign-up process. If you do not sign up before the expiration date, you must request a new code. · [facility] Access Code: [email or web address] Expires: 10/16/2018  4:35 AM 
 
4. Enter the last four digits of your Social Security Number (xxxx) and Date of Birth (mm/dd/yyyy) as indicated and click Submit. You will be taken to the next sign-up page. 5. Create a [facility] ID. This will be your [facility] login ID and cannot be changed, so think of one that is secure and easy to remember. 6. Create a [facility] password. You can change your password at any time. 7. Enter your Password Reset Question and Answer. This can be used at a later time if you forget your password. 8. Enter your e-mail address. You will receive e-mail notification when new information is available in [address] 9. Click Sign Up. You can now view and download portions of your medical record. 10. Click the Download Summary menu link to download a portable copy of your medical information. If you have questions, please visit the Frequently Asked Questions section of the [facility] website. Remember, [facility] is NOT to be used for urgent needs. For medical emergencies, dial 911. Now available from your iPhone and Android! Please provide this summary of care documentation to your next provider. Your primary care clinician is listed as Olga Monge. If you have any questions after today's visit, please call [phone].

## 2018-08-30 NOTE — PROGRESS NOTES
Identified pt with two pt identifiers(name and ). Chief Complaint Patient presents with  Abdominal Pain  
  very tender. Bwgan about 2 days ago.  Urinary Frequency  Back Pain There are no preventive care reminders to display for this patient. Wt Readings from Last 3 Encounters:  
18 232 lb (105.2 kg) 18 232 lb 12.9 oz (105.6 kg) 18 220 lb 14.4 oz (100.2 kg) Temp Readings from Last 3 Encounters:  
18 98.1 °F (36.7 °C) (Oral) 18 99 °F (37.2 °C)  
18 97.9 °F (36.6 °C) BP Readings from Last 3 Encounters:  
18 120/88  
18 (!) 169/95  
18 (!) 156/98 Pulse Readings from Last 3 Encounters:  
18 80  
18 84  
18 100 Learning Assessment: 
:  
 
Learning Assessment 2016 PRIMARY LEARNER Patient HIGHEST LEVEL OF EDUCATION - PRIMARY LEARNER  TR[employer]  
BARRIERS PRIMARY LEARNER NONE  
CO-LEARNER CAREGIVER No  
PRIMARY LANGUAGE ENGLISH  
LEARNER PREFERENCE PRIMARY DEMONSTRATION  
ANSWERED BY patient RELATIONSHIP SELF Depression Screening: 
:  
 
PHQ over the last two weeks 2018 Little interest or pleasure in doing things Not at all Feeling down, depressed, irritable, or hopeless Not at all Total Score PHQ 2 0 Fall Risk Assessment: 
:  
 
No flowsheet data found. Abuse Screening: 
:  
 
Abuse Screening Questionnaire 2018 Do you ever feel afraid of your partner? [place] [employer] Are you in a relationship with someone who physically or mentally threatens you? [place] [facility] Is it safe for you to go home? [facility] Coordination of Care Questionnaire: 
:  
 
1) Have you been to an emergency room, urgent care clinic since your last visit? no  
Hospitalized since your last visit? no          
 
2) Have you seen or consulted any other health care providers outside of [address] [place] [address] since your last visit? no  (Include any pap smears or colon screenings in this section.) 3) Do you have an Advance Directive on file? no 
Are you interested in receiving information about Advance Directives? no. 
 
Reviewed record in preparation for visit and have obtained necessary documentation. Medication reconciliation up to date and corrected with patient at this time.

## 2018-08-31 ENCOUNTER — TELEPHONE (OUTPATIENT)
Dept: FAMILY MEDICINE CLINIC | Age: 33
End: 2018-08-31

## 2018-08-31 NOTE — TELEPHONE ENCOUNTER
Forwarded from call center; Pt would like a call back from the nurse to discuss getting a prescription for a pain medication sent to the pharmacy to treat stomach. Pt is still having stomach pain.  Please send the prescription to the [facility] pharmacy ((pharmacy info is on file.kk Pt can be reached at [phone].

## 2018-08-31 NOTE — TELEPHONE ENCOUNTER
----- Message from Jerri Crystal sent at 8/31/2018  3:08 PM EDT -----  Regarding: Dr. César Coronado  Pt would like a call back from the nurse to discuss getting a prescription for a pain medication sent to the pharmacy to treat stomach. Pt is still having stomach pain. Please send the prescription to the [facility] pharmacy ((pharmacy info is on file.kk Pt can be reached at [phone].

## 2018-09-01 LAB
BACTERIA UR CULT: NORMAL
BACTERIA UR CULT: NORMAL

## 2018-09-04 ENCOUNTER — HOSPITAL ENCOUNTER (EMERGENCY)
Age: 33
Discharge: HOME | End: 2018-09-04
Attending: EMERGENCY MEDICINE
Payer: SUBSIDIZED

## 2018-09-04 VITALS
WEIGHT: 229.72 LBS | TEMPERATURE: 98.5 F | DIASTOLIC BLOOD PRESSURE: 96 MMHG | HEIGHT: 61 IN | RESPIRATION RATE: 16 BRPM | HEART RATE: 96 BPM | OXYGEN SATURATION: 98 % | BODY MASS INDEX: 43.37 KG/M2 | SYSTOLIC BLOOD PRESSURE: 140 MMHG

## 2018-09-04 DIAGNOSIS — J01.00 ACUTE MAXILLARY SINUSITIS, RECURRENCE NOT SPECIFIED: ICD-10-CM

## 2018-09-04 DIAGNOSIS — J30.1 SEASONAL ALLERGIC RHINITIS DUE TO POLLEN: Primary | ICD-10-CM

## 2018-09-04 PROCEDURE — 99282 EMERGENCY DEPT VISIT SF MDM: CPT

## 2018-09-04 RX ORDER — FLUTICASONE PROPIONATE 50 MCG
SPRAY, SUSPENSION (ML) NASAL
Qty: 1 BOTTLE | Refills: 0 | Status: SHIPPED | OUTPATIENT
Start: 2018-09-04 | End: 2019-10-22

## 2018-09-04 RX ORDER — LORATADINE 10 MG/1
10 TABLET ORAL DAILY
Qty: 90 TAB | Refills: 0 | Status: SHIPPED | OUTPATIENT
Start: 2018-09-04 | End: 2019-02-14

## 2018-09-04 NOTE — ED PROVIDER NOTES
Patient is a 26 y.o. female presenting with ear pain. The history is provided by the patient. Ear Pain This is a new problem. The current episode started 2 days ago. The problem occurs constantly. The problem has not changed since onset. Patient complains that the left ear is affected. There has been no fever. Associated symptoms include headaches and sore throat. Pertinent negatives include no abdominal pain, no vomiting and no cough. Past Medical History:  
Diagnosis Date  Anemia  Depression 5/20/2013  Diabetes ([address]  Essential hypertension  Hypertension  IBS (irritable bowel syndrome) Past Surgical History:  
Procedure Laterality Date [facility] 812 ONLY  2005 TWins at 35 wks; IOL; NRFS Family History:  
Problem Relation Age of Onset  Hypertension Mother  Bleeding Prob Mother   
  blood clots  Thyroid Disease Mother  Diabetes Father  Elevated Lipids Father  Breast Cancer Paternal Aunt   
  breast  
 Cancer Maternal Grandmother   
  cervix  Uterine Cancer Maternal Grandmother 77 Hysterectomy and chemo  Colon Cancer Neg Hx   
 Ovarian Cancer Neg Hx Social History Social History  Marital status: SINGLE Spouse name: N/A  
 Number of children: N/A  
 Years of education: N/A Occupational History  Not on file. Social History Main Topics  Smoking status: Never Smoker  Smokeless tobacco: Never Used  Alcohol use Yes Comment: occas  Drug use: No  
 Sexual activity: Yes  
  Partners: Male Birth control/ protection: None Comment: KJ; Other Topics Concern  Not on file Social History Narrative ALLERGIES: Morphine and Pcn [penicillins] Review of Systems Constitutional: Negative for chills and fever. HENT: Positive for ear pain and sore throat. Respiratory: Negative for cough and chest tightness. Cardiovascular: Negative for chest pain and palpitations. Gastrointestinal: Negative for abdominal pain, nausea and vomiting. Neurological: Positive for headaches. Negative for dizziness. Vitals:  
 09/04/18 1237 BP: (!) 140/96 Pulse: 96  
Resp: 16 Temp: 98.5 °F (36.9 °C) SpO2: 98% Weight: 104.2 kg (229 lb 11.5 oz) Height: 5' 1\" (1.549 m) Physical Exam  
Constitutional: She is oriented to person, place, and time. She appears well-developed and well-nourished. No distress. HENT:  
Head: Normocephalic and atraumatic. Right Ear: Tympanic membrane and ear canal normal.  
Left Ear: Tympanic membrane and ear canal normal.  
Nose: Mucosal edema present. Right sinus exhibits no maxillary sinus tenderness and no frontal sinus tenderness. Left sinus exhibits maxillary sinus tenderness. Left sinus exhibits no frontal sinus tenderness. Mouth/Throat: Oropharynx is clear and moist. No oropharyngeal exudate. Left maxillary sinus tenderness Eyes: Conjunctivae and EOM are normal. Pupils are equal, round, and reactive to light. Neck: Normal range of motion. Neck supple. Cardiovascular: Normal rate, regular rhythm, normal heart sounds and intact distal pulses. No murmur heard. Pulmonary/Chest: Effort normal and breath sounds normal. No stridor. No respiratory distress. Musculoskeletal: Normal range of motion. She exhibits no deformity. Neurological: She is alert and oriented to person, place, and time. Skin: She is not diaphoretic. Psychiatric: She has a normal mood and affect. Nursing note and vitals reviewed. MDM Number of Diagnoses or Management Options Acute maxillary sinusitis, recurrence not specified:  
Seasonal allergic rhinitis due to pollen:  
Diagnosis management comments: Allergic rhinitis with some sinus pressure - recommended flonase and claritin and allergy control and suggested maybe a nasal riinse No EMC at this time ED Course Procedures

## 2018-09-04 NOTE — ED NOTES
Pt given discharge instructions by Dr Carmen Cummings she verbalizes an understanding pt stable at time of discharge

## 2018-09-04 NOTE — ED TRIAGE NOTES
Patient has had left sided ear pain since Sunday, that now makes the left side of her head feel painful and 'it hurts to chew. \" Currently being treated for a UTI with Bactrim since last Thursday.

## 2018-09-04 NOTE — DISCHARGE INSTRUCTIONS
Seasonal Allergies: Care Instructions  Your Care Instructions  Allergies occur when your body's defense system (immune system) overreacts to certain substances. The immune system treats a harmless substance as if it were a harmful germ or virus. Many things can cause this to happen. Examples include pollens, medicine, food, dust, animal dander, and mold. Your allergies are seasonal if you have symptoms just at certain times of the year. In that case, you are probably allergic to pollens from certain trees, grasses, or weeds. Allergies can be mild or severe. Over-the-counter allergy medicine may help with some symptoms. Read and follow all instructions on the label. Managing your allergies is an important part of staying healthy. Your doctor may suggest that you have tests to help find the cause of your allergies. When you know what things trigger your symptoms, you can avoid them. This can prevent allergy symptoms and other health problems. In some cases, immunotherapy might help. For this treatment, you get shots or use pills that have a small amount of certain allergens in them. Your body \"gets used to\" the allergen, so you react less to it over time. This kind of treatment may help prevent or reduce some allergy symptoms. Follow-up care is a key part of your treatment and safety. Be sure to make and go to all appointments, and call your doctor if you are having problems. It's also a good idea to know your test results and keep a list of the medicines you take. How can you care for yourself at home? · Be safe with medicines. Take your medicines exactly as prescribed. Call your doctor if you think you are having a problem with your medicine. · During your allergy season, keep windows closed. If you need to use air-conditioning, change or clean all filters every month. Take a shower and change your clothes after you have been outside. · Stay inside when pollen counts are high.  Vacuum once or twice a week. Use a vacuum  with a HEPA filter or a double-thickness filter. When should you call for help? Give an epinephrine shot if:    · You think you are having a severe allergic reaction.    After giving an epinephrine shot, call 911, even if you feel better.   Call 911 if:    · You have symptoms of a severe allergic reaction. These may include:  ¨ Sudden raised, red areas (hives) all over your body. ¨ Swelling of the throat, mouth, lips, or tongue. ¨ Trouble breathing. ¨ Passing out (losing consciousness). Or you may feel very lightheaded or suddenly feel weak, confused, or restless.     · You have been given an epinephrine shot, even if you feel better.    Call your doctor now or seek immediate medical care if:    · You have symptoms of an allergic reaction, such as:  ¨ A rash or hives (raised, red areas on the skin). ¨ Itching. ¨ Swelling. ¨ Belly pain, nausea, or vomiting.    Watch closely for changes in your health, and be sure to contact your doctor if:    · You do not get better as expected. Where can you learn more? Go to http://salvatoreEmergency CallWorkselton.info/. Enter J912 in the search box to learn more about \"Seasonal Allergies: Care Instructions. \"  Current as of: October 6, 2017  Content Version: 11.7  © 4163-0165 [employer]. Care instructions adapted under license by [employer] (which disclaims liability or warranty for this information). If you have questions about a medical condition or this instruction, always ask your healthcare professional. [place] [address] any warranty or liability for your use of this information. Managing Your Allergies: Care Instructions  Your Care Instructions    Managing your allergies is an important part of staying healthy. Your doctor will help you find out what may be causing the allergies. Common causes of allergy symptoms are house dust and dust mites, animal dander, mold, and pollen.   As soon as you know what triggers your symptoms, try to reduce your exposure to your triggers. This can help prevent allergy symptoms, asthma, and other health problems. Ask your doctor about allergy medicine or immunotherapy. These treatments may help reduce or prevent allergy symptoms. Follow-up care is a key part of your treatment and safety. Be sure to make and go to all appointments, and call your doctor if you are having problems. It's also a good idea to know your test results and keep a list of the medicines you take. How can you care for yourself at home? · If you think that dust or dust mites are causing your allergies:  ¨ Wash sheets, pillowcases, and other bedding every week in hot water. ¨ Use airtight, dust-proof covers for pillows, duvets, and mattresses. Avoid plastic covers, because they tend to tear quickly and do not \"breathe. \" Wash according to the instructions. ¨ Remove extra blankets and pillows that you don't need. ¨ Use blankets that are machine-washable. ¨ Don't use home humidifiers. They can help mites live longer. · Use air-conditioning. Change or clean all filters every month. Keep windows closed. Use high-efficiency air filters. Don't use window or attic fans, which draw dust into the air. · If you're allergic to pet dander, keep pets outside or, at the very least, out of your bedroom. Old carpet and cloth-covered furniture can hold a lot of animal dander. You may need to replace them. · Look for signs of cockroaches. Use cockroach baits to get rid of them. Then clean your home well. · If you're allergic to mold, don't keep indoor plants, because molds can grow in soil. Get rid of furniture, rugs, and drapes that smell musty. Check for mold in the bathroom. · If you're allergic to pollen, stay inside when pollen counts are high. · Don't smoke or let anyone else smoke in your house. Don't use fireplaces or wood-burning stoves. Avoid paint fumes, perfumes, and other strong odors.   When should you call for help? Give an epinephrine shot if:    · You think you are having a severe allergic reaction.    After giving an epinephrine shot call 911, even if you feel better.   Call 911 if:    · You have symptoms of a severe allergic reaction. These may include:  ¨ Sudden raised, red areas (hives) all over your body. ¨ Swelling of the throat, mouth, lips, or tongue. ¨ Trouble breathing. ¨ Passing out (losing consciousness). Or you may feel very lightheaded or suddenly feel weak, confused, or restless.     · You have been given an epinephrine shot, even if you feel better.    Call your doctor now or seek immediate medical care if:    · You have symptoms of an allergic reaction, such as:  ¨ A rash or hives (raised, red areas on the skin). ¨ Itching. ¨ Swelling. ¨ Belly pain, nausea, or vomiting.    Watch closely for changes in your health, and be sure to contact your doctor if:    · Your allergies get worse.     · You need help controlling your allergies.     · You have questions about allergy testing.     · You do not get better as expected. Where can you learn more? Go to http://salvatore-elton.info/. Enter L249 in the search box to learn more about \"Managing Your Allergies: Care Instructions. \"  Current as of: Karyn 10, 2017  Content Version: 11.7  © 2401-5630 [employer]. Care instructions adapted under license by [employer] (which disclaims liability or warranty for this information). If you have questions about a medical condition or this instruction, always ask your healthcare professional. [place] [address] any warranty or liability for your use of this information.

## 2018-09-12 ENCOUNTER — OFFICE VISIT (OUTPATIENT)
Dept: FAMILY MEDICINE CLINIC | Age: 33
End: 2018-09-12

## 2018-09-12 VITALS
OXYGEN SATURATION: 98 % | TEMPERATURE: 97.7 F | DIASTOLIC BLOOD PRESSURE: 82 MMHG | WEIGHT: 233.6 LBS | HEIGHT: 61 IN | RESPIRATION RATE: 20 BRPM | BODY MASS INDEX: 44.1 KG/M2 | SYSTOLIC BLOOD PRESSURE: 124 MMHG | HEART RATE: 74 BPM

## 2018-09-12 DIAGNOSIS — K08.89 PAIN, DENTAL: Primary | ICD-10-CM

## 2018-09-12 PROBLEM — E66.01 OBESITY, MORBID (HCC): Status: ACTIVE | Noted: 2018-09-12

## 2018-09-12 RX ORDER — CLINDAMYCIN HYDROCHLORIDE 150 MG/1
150 CAPSULE ORAL EVERY 6 HOURS
Qty: 40 CAP | Refills: 0 | Status: SHIPPED | OUTPATIENT
Start: 2018-09-12 | End: 2018-09-22

## 2018-09-12 RX ORDER — NAPROXEN 500 MG/1
500 TABLET ORAL 2 TIMES DAILY WITH MEALS
Qty: 20 TAB | Refills: 0 | Status: SHIPPED | OUTPATIENT
Start: 2018-09-12 | End: 2019-02-14

## 2018-09-12 NOTE — MR AVS SNAPSHOT
[address] [facility] 
[phone] Patient: Mary Ramires MRN: [id number]:[id number] Visit Information Date & Time Provider Department Dept. Phone Encounter #  
 [id number]  8:91 PM Freeman Solis [phone] [id number] Upcoming Health Maintenance Date Due Influenza Age 5 to Adult 9/1/2019* PAP AKA CERVICAL CYTOLOGY 7/5/2020 DTaP/Tdap/Td series (2 - Td) 11/27/2023 *Topic was postponed. The date shown is not the original due date. Allergies as of 9/12/2018  Review Complete On: 9/12/2018 By: Judie Deluca LPN Severity Noted Reaction Type Reactions Morphine  02/18/2018    Hives Pcn [Penicillins]  12/13/2011   Side Effect Hives Current Immunizations  Reviewed on 9/9/2013 Name Date Influenza Vaccine 9/9/2013 MMR 1/6/2014 10:55 AM  
 TB Skin Test (PPD) Intradermal 10/14/2014, 4/30/2014, 4/1/2014 Tdap 11/27/2013 Not reviewed this visit You Were Diagnosed With   
  
 Codes Comments Pain, dental    -  Primary ICD-10-CM: K67.52 ICD-9-CM: 525.9 Vitals BP Pulse Temp Resp Height(growth percentile) Weight(growth percentile) 124/82 (BP 1 Location: Left arm, BP Patient Position: Sitting) 74 97.7 °F (36.5 °C) (Oral) 20 5' 1\" (1.549 m) 233 lb 9.6 oz (106 kg) SpO2 BMI OB Status Smoking Status 98% 44.14 kg/m2 IUD Never Smoker BMI and BSA Data Body Mass Index Body Surface Area  
 44.14 kg/m 2 2.14 m 2 Preferred Pharmacy Pharmacy Name Phone [id number] [place] [phone] Your Updated Medication List  
  
   
This list is accurate as of 9/12/18  4:25 PM.  Always use your most recent med list.  
  
  
  
  
 Blood-Glucose Meter monitoring kit Check fasting blood glucose daily as directed. Please dispense Freestyle Lite meter clindamycin 150 mg capsule Commonly known as:  CLEOCIN Take 1 Cap by mouth every six (6) hours for 10 days. fluticasone 50 mcg/actuation nasal spray Commonly known as:  FLONASE  
1 spray each nostril BID  Indications: Allergic Rhinitis  
  
 glucose blood VI test strips strip Commonly known as:  FREESTYLE LITE STRIPS Check fasting blood glucose daily. hydroCHLOROthiazide 12.5 mg tablet Commonly known as:  HYDRODIURIL Take 1 Tab by mouth daily. Lancets Misc Test fasting blood glucose daily. loratadine 10 mg tablet Commonly known as:  César Sam Take 1 Tab by mouth daily. metFORMIN 500 mg tablet Commonly known as:  GLUCOPHAGE Take 1 Tab by mouth daily (with dinner). naproxen 500 mg tablet Commonly known as:  NAPROSYN Take 1 Tab by mouth two (2) times daily (with meals). Prescriptions Sent to Pharmacy Refills  
 naproxen (NAPROSYN) 500 mg tablet 0 Sig: Take 1 Tab by mouth two (2) times daily (with meals). Class: Normal  
 Pharmacy: [facility]acy [facility] [address], [address] [place] [address] Ph #: [phone] Route: Oral  
 clindamycin (CLEOCIN) 150 mg capsule 0 Sig: Take 1 Cap by mouth every six (6) hours for 10 days. Class: Normal  
 Pharmacy: [facility]/pharmacy [facility] [address], [address] [place] [address] Ph #: [phone] Route: Oral  
  
Introducing [facility]! Emery Nicholson introduces [employer] patient portal. Now you can access parts of your medical record, email your doctor's office, and request medication refills online. 1. In your internet browser, go to [email or web address] Me 2. Click on the First Time User? Click Here link in the Sign In box. You will see the New Member Sign Up page. 3. Enter your [employer] Access Code exactly as it appears below. You will not need to use this code after youve completed the sign-up process.  If you do not sign up before the expiration date, you must request a new code. · [employer] Access Code: [email or web address] Expires: 10/16/2018  4:35 AM 
 
4. Enter the last four digits of your Social Security Number (xxxx) and Date of Birth (mm/dd/yyyy) as indicated and click Submit. You will be taken to the next sign-up page. 5. Create a [employer] ID. This will be your [employer] login ID and cannot be changed, so think of one that is secure and easy to remember. 6. Create a [employer] password. You can change your password at any time. 7. Enter your Password Reset Question and Answer. This can be used at a later time if you forget your password. 8. Enter your e-mail address. You will receive e-mail notification when new information is available in [address] 9. Click Sign Up. You can now view and download portions of your medical record. 10. Click the Download Summary menu link to download a portable copy of your medical information. If you have questions, please visit the Frequently Asked Questions section of the [employer] website. Remember, [employer] is NOT to be used for urgent needs. For medical emergencies, dial 911. Now available from your iPhone and Android! Please provide this summary of care documentation to your next provider. Your primary care clinician is listed as Adwoa Corey. If you have any questions after today's visit, please call [phone].

## 2018-09-12 NOTE — PROGRESS NOTES
Identified pt with two pt identifiers(name and ). Chief Complaint   Patient presents with    Ear Pain     right ear hurts - started a couple days ago - freida Dr Aruna White a couple weeks ago with her stomach, and ED 18    Dental Problem     she goes to [facility] next week    Sinus Infection     ? There are no preventive care reminders to display for this patient. Wt Readings from Last 3 Encounters:   18 233 lb 9.6 oz (106 kg)   18 229 lb 11.5 oz (104.2 kg)   18 232 lb (105.2 kg)     Temp Readings from Last 3 Encounters:   18 97.7 °F (36.5 °C) (Oral)   18 98.5 °F (36.9 °C)   18 98.1 °F (36.7 °C) (Oral)     BP Readings from Last 3 Encounters:   18 124/82   18 (!) 140/96   18 120/88     Pulse Readings from Last 3 Encounters:   18 74   18 96   18 80         Learning Assessment:  :     Learning Assessment 2016   PRIMARY LEARNER Patient   HIGHEST LEVEL OF EDUCATION - PRIMARY LEARNER  [employer]   BARRIERS PRIMARY LEARNER NONE   CO-LEARNER CAREGIVER No   PRIMARY LANGUAGE ENGLISH   LEARNER PREFERENCE PRIMARY DEMONSTRATION   ANSWERED BY patient   RELATIONSHIP SELF       Depression Screening:  :     PHQ over the last two weeks 2018   Little interest or pleasure in doing things Not at all   Feeling down, depressed, irritable, or hopeless Not at all   Total Score PHQ 2 0       Fall Risk Assessment:  :     No flowsheet data found. Abuse Screening:  :     Abuse Screening Questionnaire 2018   Do you ever feel afraid of your partner? N   Are you in a relationship with someone who physically or mentally threatens you? N   Is it safe for you to go home? Y       Coordination of Care Questionnaire:  :     1) Have you been to an emergency room, urgent care clinic since your last visit?  yes 18 [place] Ear Pain  Hospitalized since your last visit? no             2) Have you seen or consulted any other health care providers outside of [facility] since your last visit? no  (Include any pap smears or colon screenings in this section.)    3) Do you have an Advance Directive on file? no  Are you interested in receiving information about Advance Directives? no    Reviewed record in preparation for visit and have obtained necessary documentation. Medication reconciliation up to date and corrected with patient at this time.

## 2018-09-13 NOTE — PROGRESS NOTES
HISTORY OF PRESENT ILLNESS  Huma Oliva is a 26 y.o. female. HPI  C/O pain R ear and side of face. Pain with chewing. Last week she had pain in L ear and seen in ER. She claims to be taking Claritin daily and uses Flonase NS. Pt has appt with dentist on Oct 1. Swelling of upper gums. Review of Systems   HENT: Positive for ear pain. Teeth hurt. Visit Vitals    /82 (BP 1 Location: Left arm, BP Patient Position: Sitting)  Comment: manual    Pulse 74    Temp 97.7 °F (36.5 °C) (Oral)    Resp 20    Ht 5' 1\" (1.549 m)    Wt 233 lb 9.6 oz (106 kg)    SpO2 98%    BMI 44.14 kg/m2       Physical Exam   Constitutional: She appears well-developed and well-nourished. HENT:   Right Ear: Tympanic membrane, external ear and ear canal normal.   Left Ear: Tympanic membrane, external ear and ear canal normal.   Mouth/Throat: Mucous membranes are normal. Abnormal dentition. Vitals reviewed. ASSESSMENT and PLAN    ICD-10-CM ICD-9-CM    1. Pain, dental K08.89 525.9 naproxen (NAPROSYN) 500 mg tablet      clindamycin (CLEOCIN) 150 mg capsule     Suspect dental infection. Start on antibiotics. Refill NSAID for pain.   FU with dentist.

## 2018-11-05 ENCOUNTER — OFFICE VISIT (OUTPATIENT)
Dept: FAMILY MEDICINE CLINIC | Age: 33
End: 2018-11-05

## 2018-11-05 VITALS
TEMPERATURE: 97.9 F | RESPIRATION RATE: 20 BRPM | HEART RATE: 94 BPM | DIASTOLIC BLOOD PRESSURE: 92 MMHG | HEIGHT: 61 IN | SYSTOLIC BLOOD PRESSURE: 126 MMHG | WEIGHT: 234.6 LBS | BODY MASS INDEX: 44.29 KG/M2 | OXYGEN SATURATION: 98 %

## 2018-11-05 DIAGNOSIS — Z91.09 ENVIRONMENTAL ALLERGIES: ICD-10-CM

## 2018-11-05 DIAGNOSIS — J01.90 ACUTE SINUSITIS, RECURRENCE NOT SPECIFIED, UNSPECIFIED LOCATION: Primary | ICD-10-CM

## 2018-11-05 DIAGNOSIS — K58.9 IRRITABLE BOWEL SYNDROME, UNSPECIFIED TYPE: ICD-10-CM

## 2018-11-05 DIAGNOSIS — F32.A DEPRESSION, UNSPECIFIED DEPRESSION TYPE: ICD-10-CM

## 2018-11-05 RX ORDER — FLUOXETINE 10 MG/1
10 CAPSULE ORAL DAILY
Qty: 30 CAP | Refills: 2 | Status: SHIPPED | OUTPATIENT
Start: 2018-11-05 | End: 2019-03-26 | Stop reason: ALTCHOICE

## 2018-11-05 RX ORDER — AZITHROMYCIN 250 MG/1
TABLET, FILM COATED ORAL
Qty: 6 TAB | Refills: 0 | Status: SHIPPED | OUTPATIENT
Start: 2018-11-05 | End: 2018-11-10

## 2018-11-05 RX ORDER — ASPIRIN 81 MG/1
TABLET ORAL DAILY
COMMUNITY

## 2018-11-05 NOTE — PROGRESS NOTES
Subjective:   Melba Good is a 26 y.o. female who complains of coryza, sneezing, post nasal drip, bilateral sinus pain and fever for 3 days, gradually worsening since that time. She denies a history of shortness of breath and wheezing. Evaluation to date: none. Treatment to date: antihistamines. Patient does not smoke cigarettes. Relevant PMH: DM.    Patient Active Problem List    Diagnosis Date Noted    Mild depression ([address] 2018    Obesity, morbid ([facility] [address] 2018    Pre-diabetes 2016    Depression 2013    Fibroids 2012    HTN (hypertension) 10/12/2012     Allergies   Allergen Reactions    Morphine Hives    Pcn [Penicillins] Hives     Past Medical History:   Diagnosis Date    Anemia     Depression 2013    Diabetes ([facility] [address]     Essential hypertension     Hypertension     IBS (irritable bowel syndrome)      Past Surgical History:   Procedure Laterality Date     DELIVERY ONLY      TWins at 28 wks; IOL; NRFS     Family History   Problem Relation Age of Onset    Hypertension Mother     Bleeding Prob Mother         blood clots    Thyroid Disease Mother     Diabetes Father    [facility] Elevated Lipids Father     Breast Cancer Paternal Aunt         breast    Cancer Maternal Grandmother         cervix    Uterine Cancer Maternal Grandmother 66        Hysterectomy and chemo    Colon Cancer Neg Hx     Ovarian Cancer Neg Hx      Social History     Tobacco Use    Smoking status: Never Smoker    Smokeless tobacco: Never Used   Substance Use Topics    Alcohol use: Yes     Comment: occas        Review of Systems  Pertinent items are noted in HPI. C/O mood swings. Very irritable. Around death anniversary in May 2016. She found him dead. She has teenagers right now. Request for antidepressant. She had tried Citalopram in past but GI upset. Also request referral to Dr Moise Marcelo.     Objective:     Visit Vitals  BP (!) 126/92 (BP 1 Location: Left arm, BP Patient Position: Sitting) Comment: manual   Pulse 94   Temp 97.9 °F (36.6 °C) (Oral)   Resp 20   Ht 5' 1\" (1.549 m)   Wt 234 lb 9.6 oz (106.4 kg)   SpO2 98%   BMI 44.33 kg/m²     General:  alert, cooperative, no distress   Eyes: negative   Ears: abnormal TM AD - dull, abnormal TM AS - dull   Sinuses: tenderness over bilateral maxillary   Mouth:  Lips, mucosa, and tongue normal. Teeth and gums normal   Neck: supple, symmetrical, trachea midline and no adenopathy. Heart: S1 and S2 normal, no murmurs noted. Lungs: clear to auscultation bilaterally   Abdomen:         Assessment/Plan:   sinusitis and allergic rhinitis      ICD-10-CM ICD-9-CM    1. Acute sinusitis, recurrence not specified, unspecified location J01.90 461.9 azithromycin (ZITHROMAX) 250 mg tablet   2. Environmental allergies Z91.09 V15.09    3. Depression, unspecified depression type F32.9 311 FLUoxetine (PROZAC) 10 mg capsule   4. Irritable bowel syndrome, unspecified type K58.9 564.1 REFERRAL TO GASTROENTEROLOGY   .

## 2018-11-05 NOTE — PROGRESS NOTES
Identified pt with two pt identifiers(name and ). Chief Complaint   Patient presents with    Sinus Infection     started over the weekend    Anxiety     she thinks she needs to go back on nerve meds    Colon Cancer Screening     needs colonoscopy - she has diverticulitis - last one by Dr Adrian Prather - certain foods make her nausea - IBS        There are no preventive care reminders to display for this patient. Wt Readings from Last 3 Encounters:   18 234 lb 9.6 oz (106.4 kg)   18 233 lb 9.6 oz (106 kg)   18 229 lb 11.5 oz (104.2 kg)     Temp Readings from Last 3 Encounters:   18 97.9 °F (36.6 °C) (Oral)   18 97.7 °F (36.5 °C) (Oral)   18 98.5 °F (36.9 °C)     BP Readings from Last 3 Encounters:   18 (!) 126/92   18 124/82   18 (!) 140/96     Pulse Readings from Last 3 Encounters:   18 94   18 74   18 96         Learning Assessment:  :     Learning Assessment 2016   PRIMARY LEARNER Patient   HIGHEST LEVEL OF EDUCATION - PRIMARY LEARNER  TR[employer]   BARRIERS PRIMARY LEARNER NONE   CO-LEARNER CAREGIVER No   PRIMARY LANGUAGE ENGLISH   LEARNER PREFERENCE PRIMARY DEMONSTRATION   ANSWERED BY patient   RELATIONSHIP SELF       Depression Screening:  :     PHQ over the last two weeks 2018   Little interest or pleasure in doing things Not at all   Feeling down, depressed, irritable, or hopeless Not at all   Total Score PHQ 2 0       Fall Risk Assessment:  :     No flowsheet data found. Abuse Screening:  :     Abuse Screening Questionnaire 2018   Do you ever feel afraid of your partner? N   Are you in a relationship with someone who physically or mentally threatens you? N   Is it safe for you to go home?  Y       Coordination of Care Questionnaire:  :     1) Have you been to an emergency room, urgent care clinic since your last visit? no   Hospitalized since your last visit? no             2) Have you seen or consulted any other health care providers outside of [address] [place] [address] since your last visit? no  (Include any pap smears or colon screenings in this section.)    3) Do you have an Advance Directive on file? no  Are you interested in receiving information about Advance Directives? no    Reviewed record in preparation for visit and have obtained necessary documentation. Medication reconciliation up to date and corrected with patient at this time.

## 2019-02-14 ENCOUNTER — OFFICE VISIT (OUTPATIENT)
Dept: FAMILY MEDICINE CLINIC | Age: 34
End: 2019-02-14

## 2019-02-14 VITALS
DIASTOLIC BLOOD PRESSURE: 92 MMHG | BODY MASS INDEX: 43.43 KG/M2 | SYSTOLIC BLOOD PRESSURE: 140 MMHG | OXYGEN SATURATION: 98 % | HEIGHT: 61 IN | TEMPERATURE: 98.5 F | WEIGHT: 230 LBS | HEART RATE: 89 BPM | RESPIRATION RATE: 16 BRPM

## 2019-02-14 DIAGNOSIS — R06.83 SNORING: ICD-10-CM

## 2019-02-14 DIAGNOSIS — R10.13 DYSPEPSIA: ICD-10-CM

## 2019-02-14 DIAGNOSIS — R10.84 GENERALIZED ABDOMINAL PAIN: ICD-10-CM

## 2019-02-14 DIAGNOSIS — R07.9 CHEST PAIN, UNSPECIFIED TYPE: Primary | ICD-10-CM

## 2019-02-14 DIAGNOSIS — R73.03 PRE-DIABETES: ICD-10-CM

## 2019-02-14 DIAGNOSIS — R53.83 FATIGUE, UNSPECIFIED TYPE: ICD-10-CM

## 2019-02-14 DIAGNOSIS — I10 ESSENTIAL HYPERTENSION: ICD-10-CM

## 2019-02-14 RX ORDER — METFORMIN HYDROCHLORIDE 500 MG/1
500 TABLET ORAL
Qty: 90 TAB | Refills: 1 | Status: SHIPPED | OUTPATIENT
Start: 2019-02-14 | End: 2020-09-02 | Stop reason: SDUPTHER

## 2019-02-14 RX ORDER — PHENOL/SODIUM PHENOLATE
20 AEROSOL, SPRAY (ML) MUCOUS MEMBRANE
Qty: 14 TAB | Refills: 0 | Status: SHIPPED | OUTPATIENT
Start: 2019-02-14 | End: 2019-02-28

## 2019-02-14 RX ORDER — HYDROCHLOROTHIAZIDE 12.5 MG/1
12.5 TABLET ORAL DAILY
Qty: 90 TAB | Refills: 1 | Status: SHIPPED | OUTPATIENT
Start: 2019-02-14 | End: 2020-03-25

## 2019-02-14 NOTE — PROGRESS NOTES
Identified pt with two pt identifiers(name and ). Chief Complaint Patient presents with  Abdominal Pain Hurts when she lays down on stomach. States it is an off and on pain.  Dizziness Feels like she is lightheaded every now and then and just not herself.  Chest Pain Has had it for about 2 weeks now off an on. Decribes it as a tightness and a shooting pain down arm  Fatigue  
  is sleepy during the day and wakes up aroufn 4 am ,   
 Breathing Problem Has been SOB lately, There are no preventive care reminders to display for this patient. Wt Readings from Last 3 Encounters:  
19 230 lb (104.3 kg) 18 234 lb 9.6 oz (106.4 kg) 18 233 lb 9.6 oz (106 kg) Temp Readings from Last 3 Encounters:  
19 98.5 °F (36.9 °C) (Oral) 18 97.9 °F (36.6 °C) (Oral) 18 97.7 °F (36.5 °C) (Oral) BP Readings from Last 3 Encounters:  
19 (!) 140/92  
18 (!) 126/92  
18 124/82 Pulse Readings from Last 3 Encounters:  
19 89  
18 94  
18 74 Learning Assessment: 
:  
 
Learning Assessment 2016 PRIMARY LEARNER Patient HIGHEST LEVEL OF EDUCATION - PRIMARY LEARNER  TR[employer]CHOOL  
BARRIERS PRIMARY LEARNER NONE  
CO-LEARNER CAREGIVER No  
PRIMARY LANGUAGE ENGLISH  
LEARNER PREFERENCE PRIMARY DEMONSTRATION  
ANSWERED BY patient RELATIONSHIP SELF Depression Screening: 
:  
 
3 most recent PHQ Screens 2019 Little interest or pleasure in doing things Not at all Feeling down, depressed, irritable, or hopeless Not at all Total Score PHQ 2 0 Fall Risk Assessment: 
:  
 
No flowsheet data found. Abuse Screening: 
:  
 
Abuse Screening Questionnaire 2019 Do you ever feel afraid of your partner? Kerry Faulkner Are you in a relationship with someone who physically or mentally threatens you? Kerry Faulkner Is it safe for you to go home? Master Wright Coordination of Care Questionnaire: :  
 
1) Have you been to an emergency room, urgent care clinic since your last visit? no  
Hospitalized since your last visit? no          
 
2) Have you seen or consulted any other health care providers outside of [address] [place] [address] since your last visit? no  (Include any pap smears or colon screenings in this section.) 3) Do you have an Advance Directive on file? no 
Are you interested in receiving information about Advance Directives? no 
 
Reviewed record in preparation for visit and have obtained necessary documentation. Medication reconciliation up to date and corrected with patient at this time.

## 2019-02-14 NOTE — PATIENT INSTRUCTIONS
Indigestion (Dyspepsia or Heartburn): Care Instructions Your Care Instructions Sometimes it can be hard to pinpoint the cause of indigestion. (It is also called dyspepsia or heartburn.) Most cases of an upset stomach with bloating, burning, burping, and nausea are minor and go away within several hours. Home treatment and over-the-counter medicine often are able to control symptoms. But if you take medicine to relieve your indigestion without making diet and lifestyle changes, your symptoms are likely to return again and again. If you get indigestion often, it may be a sign of a more serious medical problem. Be sure to follow up with your doctor, who may want to do tests to be sure of the cause of your indigestion. Follow-up care is a key part of your treatment and safety. Be sure to make and go to all appointments, and call your doctor if you are having problems. It's also a good idea to know your test results and keep a list of the medicines you take. How can you care for yourself at home? · Your doctor may recommend over-the-counter medicine. For mild or occasional indigestion, antacids such as Gaviscon, Mylanta, Maalox, or Tums, may help. Be safe with medicines. Be careful when you take over-the-counter antacid medicines. Many of these medicines have aspirin in them. Read the label to make sure that you are not taking more than the recommended dose. Too much aspirin can be harmful. · Your doctor also may recommend over-the-counter acid reducers, such as Pepcid AC, Tagamet HB, Zantac 75, or Prilosec. Read and follow all instructions on the label. If you use these medicines often, talk with your doctor. · Change your eating habits. ? It's best to eat several small meals instead of two or three large meals. ? After you eat, wait 2 to 3 hours before you lie down. ? Chocolate, mint, and alcohol can make GERD worse. ?  Spicy foods, foods that have a lot of acid (like tomatoes and oranges), and coffee can make GERD symptoms worse in some people. If your symptoms are worse after you eat a certain food, you may want to stop eating that food to see if your symptoms get better. · Do not smoke or chew tobacco. Smoking can make GERD worse. If you need help quitting, talk to your doctor about stop-smoking programs and medicines. These can increase your chances of quitting for good. · If you have GERD symptoms at night, raise the head of your bed 6 to 8 inches. You can do this by putting the frame on blocks or placing a foam wedge under the head of your mattress. (Adding extra pillows does not work.) · Do not wear tight clothing around your middle. · Lose weight if you need to. Losing just 5 to 10 pounds can help. · Do not take anti-inflammatory medicines, such as aspirin, ibuprofen (Advil, Motrin), or naproxen (Aleve). These can irritate the stomach. If you need a pain medicine, try acetaminophen (Tylenol), which does not cause stomach upset. When should you call for help? Call your doctor now or seek immediate medical care if: 
  · You have new or worse belly pain.  
  · You are vomiting.  
 Watch closely for changes in your health, and be sure to contact your doctor if: 
  · You have new or worse symptoms of indigestion.  
  · You have trouble or pain swallowing.  
  · You are losing weight.  
  · You do not get better as expected. Where can you learn more? Go to http://salvatore-elton.info/. Enter Y805 in the search box to learn more about \"Indigestion (Dyspepsia or Heartburn): Care Instructions. \" Current as of: March 27, 2018 Content Version: 11.9 © 1351-4600 [employer]. Care instructions adapted under license by [employer] (which disclaims liability or warranty for this information).  If you have questions about a medical condition or this instruction, always ask your healthcare professional. Lita Fox, [facility] disclaims any warranty or liability for your use of this information. Fatigue: Care Instructions Your Care Instructions Fatigue is a feeling of tiredness, exhaustion, or lack of energy. You may feel fatigue because of too much or not enough activity. It can also come from stress, lack of sleep, boredom, and poor diet. Many medical problems, such as viral infections, can cause fatigue. Emotional problems, especially depression, are often the cause of fatigue. Fatigue is most often a symptom of another problem. Treatment for fatigue depends on the cause. For example, if you have fatigue because you have a certain health problem, treating this problem also treats your fatigue. If depression or anxiety is the cause, treatment may help. Follow-up care is a key part of your treatment and safety. Be sure to make and go to all appointments, and call your doctor if you are having problems. It's also a good idea to know your test results and keep a list of the medicines you take. How can you care for yourself at home? · Get regular exercise. But don't overdo it. Go back and forth between rest and exercise. · Get plenty of rest. 
· Eat a healthy diet. Do not skip meals, especially breakfast. 
· Reduce your use of caffeine, tobacco, and alcohol. Caffeine is most often found in coffee, tea, cola drinks, and chocolate. · Limit medicines that can cause fatigue. This includes tranquilizers and cold and allergy medicines. When should you call for help? Watch closely for changes in your health, and be sure to contact your doctor if: 
  · You have new symptoms such as fever or a rash.  
  · Your fatigue gets worse.  
  · You have been feeling down, depressed, or hopeless. Or you may have lost interest in things that you usually enjoy.  
  · You are not getting better as expected. Where can you learn more? Go to http://salvatore-elton.info/. Enter B070 in the search box to learn more about \"Fatigue: Care Instructions. \" Current as of: September 23, 2018 Content Version: 11.9 © 3021-9658 [employer]. Care instructions adapted under license by "[employer]" (which disclaims liability or warranty for this information). If you have questions about a medical condition or this instruction, always ask your healthcare professional. [place] [address] any warranty or liability for your use of this information.

## 2019-02-15 LAB
ALBUMIN SERPL-MCNC: 3.8 G/DL (ref 3.5–5.5)
ALBUMIN/GLOB SERPL: 1.2 {RATIO} (ref 1.2–2.2)
ALP SERPL-CCNC: 73 IU/L (ref 39–117)
ALT SERPL-CCNC: 13 IU/L (ref 0–32)
AST SERPL-CCNC: 11 IU/L (ref 0–40)
BASOPHILS # BLD AUTO: 0 X10E3/UL (ref 0–0.2)
BASOPHILS NFR BLD AUTO: 0 %
BILIRUB SERPL-MCNC: <0.2 MG/DL (ref 0–1.2)
BUN SERPL-MCNC: 7 MG/DL (ref 6–20)
BUN/CREAT SERPL: 11 (ref 9–23)
CALCIUM SERPL-MCNC: 9.1 MG/DL (ref 8.7–10.2)
CHLORIDE SERPL-SCNC: 104 MMOL/L (ref 96–106)
CO2 SERPL-SCNC: 22 MMOL/L (ref 20–29)
CREAT SERPL-MCNC: 0.62 MG/DL (ref 0.57–1)
EOSINOPHIL # BLD AUTO: 0.4 X10E3/UL (ref 0–0.4)
EOSINOPHIL NFR BLD AUTO: 5 %
ERYTHROCYTE [DISTWIDTH] IN BLOOD BY AUTOMATED COUNT: 14.1 % (ref 12.3–15.4)
EST. AVERAGE GLUCOSE BLD GHB EST-MCNC: 134 MG/DL
GLOBULIN SER CALC-MCNC: 3.1 G/DL (ref 1.5–4.5)
GLUCOSE SERPL-MCNC: 93 MG/DL (ref 65–99)
HBA1C MFR BLD: 6.3 % (ref 4.8–5.6)
HCT VFR BLD AUTO: 38 % (ref 34–46.6)
HGB BLD-MCNC: 11.9 G/DL (ref 11.1–15.9)
IMM GRANULOCYTES # BLD AUTO: 0 X10E3/UL (ref 0–0.1)
IMM GRANULOCYTES NFR BLD AUTO: 0 %
LIPASE SERPL-CCNC: 32 U/L (ref 14–72)
LYMPHOCYTES # BLD AUTO: 2.8 X10E3/UL (ref 0.7–3.1)
LYMPHOCYTES NFR BLD AUTO: 33 %
MCH RBC QN AUTO: 28.9 PG (ref 26.6–33)
MCHC RBC AUTO-ENTMCNC: 31.3 G/DL (ref 31.5–35.7)
MCV RBC AUTO: 92 FL (ref 79–97)
MONOCYTES # BLD AUTO: 0.6 X10E3/UL (ref 0.1–0.9)
MONOCYTES NFR BLD AUTO: 7 %
NEUTROPHILS # BLD AUTO: 4.6 X10E3/UL (ref 1.4–7)
NEUTROPHILS NFR BLD AUTO: 55 %
PLATELET # BLD AUTO: 314 X10E3/UL (ref 150–379)
POTASSIUM SERPL-SCNC: 4.1 MMOL/L (ref 3.5–5.2)
PROT SERPL-MCNC: 6.9 G/DL (ref 6–8.5)
RBC # BLD AUTO: 4.12 X10E6/UL (ref 3.77–5.28)
SODIUM SERPL-SCNC: 141 MMOL/L (ref 134–144)
T4 FREE SERPL-MCNC: 1.2 NG/DL (ref 0.82–1.77)
TSH SERPL DL<=0.005 MIU/L-ACNC: 1.11 UIU/ML (ref 0.45–4.5)
WBC # BLD AUTO: 8.5 X10E3/UL (ref 3.4–10.8)

## 2019-02-20 DIAGNOSIS — R10.13 DYSPEPSIA: ICD-10-CM

## 2019-02-20 RX ORDER — PHENOL/SODIUM PHENOLATE
20 AEROSOL, SPRAY (ML) MUCOUS MEMBRANE
Qty: 14 TAB | Refills: 0 | Status: CANCELLED | OUTPATIENT
Start: 2019-02-20 | End: 2019-03-06

## 2019-02-28 ENCOUNTER — HOSPITAL ENCOUNTER (EMERGENCY)
Age: 34
Discharge: HOME | End: 2019-02-28
Attending: EMERGENCY MEDICINE
Payer: COMMERCIAL

## 2019-02-28 ENCOUNTER — APPOINTMENT (OUTPATIENT)
Dept: GENERAL RADIOLOGY | Age: 34
End: 2019-02-28
Attending: EMERGENCY MEDICINE
Payer: COMMERCIAL

## 2019-02-28 VITALS
BODY MASS INDEX: 42.03 KG/M2 | DIASTOLIC BLOOD PRESSURE: 81 MMHG | RESPIRATION RATE: 14 BRPM | OXYGEN SATURATION: 99 % | SYSTOLIC BLOOD PRESSURE: 141 MMHG | WEIGHT: 237.22 LBS | HEIGHT: 63 IN | TEMPERATURE: 97.9 F | HEART RATE: 82 BPM

## 2019-02-28 DIAGNOSIS — J06.9 ACUTE URI: Primary | ICD-10-CM

## 2019-02-28 DIAGNOSIS — J42 CHRONIC BRONCHITIS, UNSPECIFIED CHRONIC BRONCHITIS TYPE (HCC): ICD-10-CM

## 2019-02-28 LAB
ANION GAP SERPL CALC-SCNC: 8 MMOL/L (ref 5–15)
BASOPHILS # BLD: 0 K/UL (ref 0–0.1)
BASOPHILS NFR BLD: 0 % (ref 0–1)
BUN SERPL-MCNC: 9 MG/DL (ref 6–20)
BUN/CREAT SERPL: 13 (ref 12–20)
CALCIUM SERPL-MCNC: 8.7 MG/DL (ref 8.5–10.1)
CHLORIDE SERPL-SCNC: 103 MMOL/L (ref 97–108)
CO2 SERPL-SCNC: 29 MMOL/L (ref 21–32)
CREAT SERPL-MCNC: 0.68 MG/DL (ref 0.55–1.02)
DIFFERENTIAL METHOD BLD: ABNORMAL
EOSINOPHIL # BLD: 0.3 K/UL (ref 0–0.4)
EOSINOPHIL NFR BLD: 5 % (ref 0–7)
ERYTHROCYTE [DISTWIDTH] IN BLOOD BY AUTOMATED COUNT: 12.8 % (ref 11.5–14.5)
FLUAV AG NPH QL IA: NEGATIVE
FLUBV AG NOSE QL IA: NEGATIVE
GLUCOSE SERPL-MCNC: 113 MG/DL (ref 65–100)
HCT VFR BLD AUTO: 38.2 % (ref 35–47)
HGB BLD-MCNC: 12.4 G/DL (ref 11.5–16)
LYMPHOCYTES # BLD: 1.7 K/UL (ref 0.8–3.5)
LYMPHOCYTES NFR BLD: 26 % (ref 12–49)
MCH RBC QN AUTO: 29.5 PG (ref 26–34)
MCHC RBC AUTO-ENTMCNC: 32.5 G/DL (ref 30–36.5)
MCV RBC AUTO: 91 FL (ref 80–99)
MONOCYTES # BLD: 0.4 K/UL (ref 0–1)
MONOCYTES NFR BLD: 6 % (ref 5–13)
NEUTS SEG # BLD: 4.2 K/UL (ref 1.8–8)
NEUTS SEG NFR BLD: 63 % (ref 32–75)
PLATELET # BLD AUTO: 297 K/UL (ref 150–400)
PMV BLD AUTO: 8.8 FL (ref 8.9–12.9)
POTASSIUM SERPL-SCNC: 3.6 MMOL/L (ref 3.5–5.1)
RBC # BLD AUTO: 4.2 M/UL (ref 3.8–5.2)
SODIUM SERPL-SCNC: 140 MMOL/L (ref 136–145)
WBC # BLD AUTO: 6.7 K/UL (ref 3.6–11)
XXWBCSUS: 0

## 2019-02-28 PROCEDURE — 85025 COMPLETE CBC W/AUTO DIFF WBC: CPT

## 2019-02-28 PROCEDURE — 80048 BASIC METABOLIC PNL TOTAL CA: CPT

## 2019-02-28 PROCEDURE — 71046 X-RAY EXAM CHEST 2 VIEWS: CPT

## 2019-02-28 PROCEDURE — 36415 COLL VENOUS BLD VENIPUNCTURE: CPT

## 2019-02-28 PROCEDURE — 87804 INFLUENZA ASSAY W/OPTIC: CPT

## 2019-02-28 PROCEDURE — 99282 EMERGENCY DEPT VISIT SF MDM: CPT

## 2019-02-28 RX ORDER — AZITHROMYCIN 250 MG/1
TABLET, FILM COATED ORAL
Qty: 6 TAB | Refills: 0 | Status: SHIPPED | OUTPATIENT
Start: 2019-02-28 | End: 2019-03-26 | Stop reason: ALTCHOICE

## 2019-02-28 NOTE — ED TRIAGE NOTES
Pt ambulated to the treatment area with a steady gait. Pt states \"2 days ago I started with cough fever up to 101 and headache. \" Respirations even and non labored at this time.

## 2019-02-28 NOTE — ED NOTES
Pt given a warm blanket for comfort, also provided with some water to sip on. No other concerns at this time, will continue to monitor, call bell within reach, use explained.

## 2019-02-28 NOTE — ED NOTES
Pt was discharged and given instructions by Dr Arden Phoenix . Pt verbalized good understanding of all discharge instructions,prescriptions and F/U care. All questions answered. Pt in stable condition on discharge.

## 2019-02-28 NOTE — ED PROVIDER NOTES
HPI  
The patient is a 76-year-old white female presents to the emergency room with acute onset of a cough with fever and chills low grade 101 had flu shot this year, denies any sore throat vomiting diarrhea etc. an x-ray was done which revealed no pneumonia flu was negative labs were normal Lennox Catalan she has an upper respiratory infection or flulike illness with discharged home on Zithromax with close followup by PCP. Past Medical History:  
Diagnosis Date  Anemia  Depression 5/20/2013  Diabetes ([address]  Essential hypertension  Hypertension  IBS (irritable bowel syndrome) Past Surgical History:  
Procedure Laterality Date [facility] 812 ONLY  2005 TWins at 35 wks; IOL; NRFS Family History:  
Problem Relation Age of Onset  Hypertension Mother  Bleeding Prob Mother   
     blood clots  Thyroid Disease Mother  Diabetes Father  Elevated Lipids Father  Breast Cancer Paternal Aunt   
     breast  
 Cancer Maternal Grandmother   
     cervix  Uterine Cancer Maternal Grandmother 77 Hysterectomy and chemo  Colon Cancer Neg Hx   
 Ovarian Cancer Neg Hx Social History Socioeconomic History  Marital status: SINGLE Spouse name: Not on file  Number of children: Not on file  Years of education: Not on file  Highest education level: Not on file Social Needs  Financial resource strain: Not on file  Food insecurity - worry: Not on file  Food insecurity - inability: Not on file  Transportation needs - medical: Not on file  Transportation needs - non-medical: Not on file Occupational History  Not on file Tobacco Use  Smoking status: Never Smoker  Smokeless tobacco: Never Used Substance and Sexual Activity  Alcohol use: Yes Comment: occas  Drug use: No  
 Sexual activity: Yes  
  Partners: Male Birth control/protection: None, Implant Comment: KJ; Other Topics Concern  Not on file Social History Narrative  Not on file ALLERGIES: Morphine and Pcn [penicillins] Review of Systems All other systems reviewed and are negative. Vitals:  
 02/28/19 8332 BP: 141/81 Pulse: 82 Resp: 14 Temp: 97.9 °F (36.6 °C) SpO2: 99% Weight: 107.6 kg (237 lb 3.4 oz) Height: 5' 3\" (1.6 m) Physical Exam  
Constitutional: She is oriented to person, place, and time. She appears well-developed and well-nourished. HENT:  
Head: Normocephalic and atraumatic. Mouth/Throat: Oropharynx is clear and moist. No oropharyngeal exudate. Eyes: Conjunctivae are normal. No scleral icterus. Neck: Neck supple. No thyromegaly present. Cardiovascular: Normal rate, regular rhythm and normal heart sounds. Exam reveals no gallop and no friction rub. No murmur heard. Pulmonary/Chest: Effort normal and breath sounds normal. No stridor. No respiratory distress. She has no wheezes. She has no rales. Abdominal: Soft. Bowel sounds are normal. There is no tenderness. There is no rebound and no guarding. Musculoskeletal: Normal range of motion. Lymphadenopathy:  
  She has no cervical adenopathy. Neurological: She is alert and oriented to person, place, and time. Skin: Skin is warm and dry. Psychiatric: She has a normal mood and affect. Nursing note and vitals reviewed. MDM Procedures

## 2019-02-28 NOTE — LETTER
[facility] EMERGENCY DEPT 
[address] [place] [address] Dinorah Ruiz [address] [id number] 
[phone] Work/School Note Date: 2/28/2019 To Whom It May concern: 
 
López Dominguez was seen and treated today in the emergency room by the following provider(s): 
Attending Provider: Scooby Yuen MD.   
 
López Dominguez may return to work on 3/4/19 if has been 24 hours without a fever.  
 
Sincerely, 
 
 
 
 
Vannesa Patel RN

## 2019-03-26 ENCOUNTER — OFFICE VISIT (OUTPATIENT)
Dept: FAMILY MEDICINE CLINIC | Age: 34
End: 2019-03-26

## 2019-03-26 VITALS
DIASTOLIC BLOOD PRESSURE: 88 MMHG | SYSTOLIC BLOOD PRESSURE: 138 MMHG | RESPIRATION RATE: 18 BRPM | HEIGHT: 63 IN | OXYGEN SATURATION: 98 % | HEART RATE: 114 BPM | BODY MASS INDEX: 40.75 KG/M2 | WEIGHT: 230 LBS | TEMPERATURE: 99.1 F

## 2019-03-26 DIAGNOSIS — J06.9 URI WITH COUGH AND CONGESTION: Primary | ICD-10-CM

## 2019-03-26 DIAGNOSIS — R68.89 FLU-LIKE SYMPTOMS: ICD-10-CM

## 2019-03-26 DIAGNOSIS — J01.90 ACUTE RHINOSINUSITIS: ICD-10-CM

## 2019-03-26 LAB
QUICKVUE INFLUENZA TEST: NEGATIVE
VALID INTERNAL CONTROL?: YES

## 2019-03-26 RX ORDER — CEFDINIR 300 MG/1
300 CAPSULE ORAL 2 TIMES DAILY
Qty: 20 CAP | Refills: 0 | Status: SHIPPED | OUTPATIENT
Start: 2019-03-26 | End: 2019-04-05

## 2019-03-26 NOTE — PROGRESS NOTES
Identified pt with two pt identifiers(name and ). Chief Complaint   Patient presents with    Fever     100 this morning. Symptoms began this morning    Generalized Body Aches    Abdominal Pain        There are no preventive care reminders to display for this patient. Wt Readings from Last 3 Encounters:   19 230 lb (104.3 kg)   19 237 lb 3.4 oz (107.6 kg)   19 230 lb (104.3 kg)     Temp Readings from Last 3 Encounters:   19 99.1 °F (37.3 °C) (Oral)   19 97.9 °F (36.6 °C)   19 98.5 °F (36.9 °C) (Oral)     BP Readings from Last 3 Encounters:   19 138/88   19 141/81   19 (!) 140/92     Pulse Readings from Last 3 Encounters:   19 (!) 114   19 82   19 89         Learning Assessment:  :     Learning Assessment 2016   PRIMARY LEARNER Patient   HIGHEST LEVEL OF EDUCATION - PRIMARY LEARNER  [employer]   BARRIERS PRIMARY LEARNER NONE   CO-LEARNER CAREGIVER No   PRIMARY LANGUAGE ENGLISH   LEARNER PREFERENCE PRIMARY DEMONSTRATION   ANSWERED BY patient   RELATIONSHIP SELF       Depression Screening:  :     3 most recent PHQ Screens 2019   Little interest or pleasure in doing things Not at all   Feeling down, depressed, irritable, or hopeless Not at all   Total Score PHQ 2 0       Fall Risk Assessment:  :     No flowsheet data found. Abuse Screening:  :     Abuse Screening Questionnaire 2019   Do you ever feel afraid of your partner? N N   Are you in a relationship with someone who physically or mentally threatens you? N N   Is it safe for you to go home?  Y Y       Coordination of Care Questionnaire:  :     1) Have you been to an emergency room, urgent care clinic since your last visit? no   Hospitalized since your last visit? no             2) Have you seen or consulted any other health care providers outside of [address] [place] [address] since your last visit? no  (Include any pap smears or colon screenings in this section.)    3) Do you have an Advance Directive on file? no  Are you interested in receiving information about Advance Directives? no    Reviewed record in preparation for visit and have obtained necessary documentation. Medication reconciliation up to date and corrected with patient at this time.

## 2019-03-26 NOTE — LETTER
NOTIFICATION RETURN TO WORK / SCHOOL 
 
3/26/2019 4:38 PM 
 
Ms. Ginger Sweet [address] [address] To Whom It May Concern: 
 
Ginger Sweet is currently under the care of [address] [place] [address]. She will return to work/school on: 3/29/19. Please excuse her absence 3/27/19 and 3/28/19 due to illness. If there are questions or concerns please have the patient contact our office. Sincerely, Barron Ahuja MD

## 2019-03-26 NOTE — PROGRESS NOTES
Subjective:      John Candelario is a 37 y.o. female here with complaint of congestion, dry cough, headache, chills and low grade fevers, chills and myalgias for 1 day. She denies a history of shortness of breath, vomiting and sputum production and denies a history of asthma. Positive sick contacts at her work. Appetite has been decreased. Also has had intermittent pain in the right ear with no drainage noted. Evaluation to date: none   Treatment to date: none       Current Outpatient Medications   Medication Sig Dispense Refill    hydroCHLOROthiazide (HYDRODIURIL) 12.5 mg tablet Take 1 Tab by mouth daily. 90 Tab 1    metFORMIN (GLUCOPHAGE) 500 mg tablet Take 1 Tab by mouth daily (with dinner). 90 Tab 1    aspirin delayed-release 81 mg tablet Take  by mouth daily.  fluticasone (FLONASE) 50 mcg/actuation nasal spray 1 spray each nostril BID  Indications: Allergic Rhinitis 1 Bottle 0    glucose blood VI test strips (FREESTYLE LITE STRIPS) strip Check fasting blood glucose daily. 100 Strip 2    Blood-Glucose Meter monitoring kit Check fasting blood glucose daily as directed. Please dispense Freestyle Lite meter 1 Kit 0    Lancets misc Test fasting blood glucose daily. 100 Each 2       Allergies   Allergen Reactions    Morphine Hives    Pcn [Penicillins] Hives       Past Medical History:   Diagnosis Date    Anemia     Depression 5/20/2013    Diabetes ([facility]     Essential hypertension     Hypertension     IBS (irritable bowel syndrome)        Social History     Tobacco Use    Smoking status: Never Smoker    Smokeless tobacco: Never Used   Substance Use Topics    Alcohol use: Yes     Comment: occas        Review of Systems  Pertinent items are noted in HPI.      Objective:     Visit Vitals  /88 (BP 1 Location: Right arm, BP Patient Position: Sitting) Comment: Manual   Pulse (!) 114   Temp 99.1 °F (37.3 °C) (Oral) Comment: .   Resp 18   Ht 5' 3\" (1.6 m)   Wt 230 lb (104.3 kg)   SpO2 98% BMI 40.74 kg/m²      General appearance - alert, well appearing, and in no distress  Eyes - pupils equal and reactive, extraocular eye movements intact, sclera anicteric  Ears - bilateral TM's and external ear canals normal  Nose - mucosal congestion, mucosal erythema and sinus tenderness noted frontal and maxillary sinuses   Oropharyngx - mucous membranes moist, pharynx normal without lesions  Neck - supple, no significant adenopathy  Chest - clear to auscultation, no wheezes, rales or rhonchi, symmetric air entry, no tachypnea, retractions or cyanosis  Heart - normal rate, regular rhythm, normal S1, S2, no murmurs, rubs, clicks or gallops    Recent Results (from the past 12 hour(s))   AMB POC RAPID INFLUENZA TEST    Collection Time: 03/26/19  4:11 PM   Result Value Ref Range    VALID INTERNAL CONTROL POC Yes     QuickVue Influenza test Negative Negative       Assessment/Plan:   Nitin Ryder is a 37 y.o. female seen for:     1. URI with cough and congestion  - cefdinir (OMNICEF) 300 mg capsule; Take 1 Cap by mouth two (2) times a day for 10 days. Dispense: 20 Cap; Refill: 0  - push fluids, rest, OTC cough medication of choice, nasal saline for congestion, warm compresses to sinuses  - work note provided     2. Acute rhinosinusitis  - cefdinir (OMNICEF) 300 mg capsule; Take 1 Cap by mouth two (2) times a day for 10 days. Dispense: 20 Cap; Refill: 0    3. Flu-like symptoms: negative flu testing.   - AMB POC RAPID INFLUENZA TEST    I have discussed the diagnosis with the patient and the intended plan as seen in the above orders. The patient has received an after-visit summary and questions were answered concerning future plans. I have discussed medication side effects and warnings with the patient as well. Patient verbalizes understanding of plan of care and denies further questions or concerns at this time. Informed patient to return to the office if symptoms worsen or if new symptoms arise.

## 2019-03-26 NOTE — PATIENT INSTRUCTIONS
Sinusitis: Care Instructions  Your Care Instructions    Sinusitis is an infection of the lining of the sinus cavities in your head. Sinusitis often follows a cold. It causes pain and pressure in your head and face. In most cases, sinusitis gets better on its own in 1 to 2 weeks. But some mild symptoms may last for several weeks. Sometimes antibiotics are needed. Follow-up care is a key part of your treatment and safety. Be sure to make and go to all appointments, and call your doctor if you are having problems. It's also a good idea to know your test results and keep a list of the medicines you take. How can you care for yourself at home? · Take an over-the-counter pain medicine, such as acetaminophen (Tylenol), ibuprofen (Advil, Motrin), or naproxen (Aleve). Read and follow all instructions on the label. · If the doctor prescribed antibiotics, take them as directed. Do not stop taking them just because you feel better. You need to take the full course of antibiotics. · Be careful when taking over-the-counter cold or flu medicines and Tylenol at the same time. Many of these medicines have acetaminophen, which is Tylenol. Read the labels to make sure that you are not taking more than the recommended dose. Too much acetaminophen (Tylenol) can be harmful. · Breathe warm, moist air from a steamy shower, a hot bath, or a sink filled with hot water. Avoid cold, dry air. Using a humidifier in your home may help. Follow the directions for cleaning the machine. · Use saline (saltwater) nasal washes to help keep your nasal passages open and wash out mucus and bacteria. You can buy saline nose drops at a grocery store or drugstore. Or you can make your own at home by adding 1 teaspoon of salt and 1 teaspoon of baking soda to 2 cups of distilled water. If you make your own, fill a bulb syringe with the solution, insert the tip into your nostril, and squeeze gently. Theodor Eisenmenger your nose.   · Put a hot, wet towel or a warm gel pack on your face 3 or 4 times a day for 5 to 10 minutes each time. · Try a decongestant nasal spray like oxymetazoline (Afrin). Do not use it for more than 3 days in a row. Using it for more than 3 days can make your congestion worse. When should you call for help? Call your doctor now or seek immediate medical care if:    · You have new or worse swelling or redness in your face or around your eyes.     · You have a new or higher fever.    Watch closely for changes in your health, and be sure to contact your doctor if:    · You have new or worse facial pain.     · The mucus from your nose becomes thicker (like pus) or has new blood in it.     · You are not getting better as expected. Where can you learn more? Go to http://salvatore-elton.info/. Enter N745 in the search box to learn more about \"Sinusitis: Care Instructions. \"  Current as of: March 27, 2018  Content Version: 11.9  © 7157-4362 [employer]. Care instructions adapted under license by [employer] (which disclaims liability or warranty for this information). If you have questions about a medical condition or this instruction, always ask your healthcare professional. [address] [address] any warranty or liability for your use of this information.

## 2019-05-07 ENCOUNTER — OFFICE VISIT (OUTPATIENT)
Dept: SLEEP MEDICINE | Age: 34
End: 2019-05-07

## 2019-05-07 VITALS
SYSTOLIC BLOOD PRESSURE: 130 MMHG | HEIGHT: 63 IN | HEART RATE: 89 BPM | OXYGEN SATURATION: 97 % | WEIGHT: 233.6 LBS | DIASTOLIC BLOOD PRESSURE: 95 MMHG | BODY MASS INDEX: 41.39 KG/M2

## 2019-05-07 DIAGNOSIS — E66.01 OBESITY, MORBID (HCC): ICD-10-CM

## 2019-05-07 DIAGNOSIS — I10 ESSENTIAL HYPERTENSION: ICD-10-CM

## 2019-05-07 DIAGNOSIS — G47.33 OBSTRUCTIVE SLEEP APNEA (ADULT) (PEDIATRIC): Primary | ICD-10-CM

## 2019-05-07 NOTE — PROGRESS NOTES
Insurance has not yet authorized use of HST at this time. We will contact patient to pickup the device and receive education on how to use it once authorization is obtained.

## 2019-05-07 NOTE — PATIENT INSTRUCTIONS
217 [facility] [place]. [place], [address]  Tel.  [phone]  Fax. 100 [facility] 60  [place], [address] [facility]  Tel.  [phone]  Fax. [phone] [address]  Tel.  [phone]  Fax. [phone]     Sleep Apnea: After Your Visit  Your Care Instructions  Sleep apnea occurs when you frequently stop breathing for 10 seconds or longer during sleep. It can be mild to severe, based on the number of times per hour that you stop breathing or have slowed breathing. Blocked or narrowed airways in your nose, mouth, or throat can cause sleep apnea. Your airway can become blocked when your throat muscles and tongue relax during sleep. Sleep apnea is common, occurring in 1 out of 20 individuals. Individuals having any of the following characteristics should be evaluated and treated right away due to high risk and detrimental consequences from untreated sleep apnea:  1. Obesity  2. Congestive Heart failure  3. Atrial Fibrillation  4. Uncontrolled Hypertension  5. Type II Diabetes  6. Night-time Arrhythmias  7. Stroke  8. Pulmonary Hypertension  9. High-risk Driving Populations (pilots, truck drivers, etc.)  10. Patients Considering Weight-loss Surgery    How do you know you have sleep apnea? You probably have sleep apnea if you answer 'yes' to 3 or more of the following questions:  S - Have you been told that you Snore? T - Are you often Tired during the day? O - Has anyone Observed you stop breathing while sleeping? P- Do you have (or are being treated for) high blood Pressure? B - Are you obese (Body Mass Index > 35)? A - Is your Age 48years old or older? N - Is your Neck size greater than 16 inches? G - Are you male Gender? A sleep physician can prescribe a breathing device that prevents tissues in the throat from blocking your airway.  Or your doctor may recommend using a dental device (oral breathing device) to help keep your airway open. In some cases, surgery may be needed to remove enlarged tissues in the throat. Follow-up care is a key part of your treatment and safety. Be sure to make and go to all appointments, and call your doctor if you are having problems. It's also a good idea to know your test results and keep a list of the medicines you take. How can you care for yourself at home? · Lose weight, if needed. It may reduce the number of times you stop breathing or have slowed breathing. · Go to bed at the same time every night. · Sleep on your side. It may stop mild apnea. If you tend to roll onto your back, sew a pocket in the back of your pajama top. Put a tennis ball into the pocket, and stitch the pocket shut. This will help keep you from sleeping on your back. · Avoid alcohol and medicines such as sleeping pills and sedatives before bed. · Do not smoke. Smoking can make sleep apnea worse. If you need help quitting, talk to your doctor about stop-smoking programs and medicines. These can increase your chances of quitting for good. · Prop up the head of your bed 4 to 6 inches by putting bricks under the legs of the bed. · Treat breathing problems, such as a stuffy nose, caused by a cold or allergies. · Use a continuous positive airway pressure (CPAP) breathing machine if lifestyle changes do not help your apnea and your doctor recommends it. The machine keeps your airway from closing when you sleep. · If CPAP does not help you, ask your doctor whether you should try other breathing machines. A bilevel positive airway pressure machine has two types of air pressureâone for breathing in and one for breathing out. Another device raises or lowers air pressure as needed while you breathe. · If your nose feels dry or bleeds when using one of these machines, talk with your doctor about increasing moisture in the air. A humidifier may help.   · If your nose is runny or stuffy from using a breathing machine, talk with your doctor about using decongestants or a corticosteroid nasal spray. When should you call for help? Watch closely for changes in your health, and be sure to contact your doctor if:  · You still have sleep apnea even though you have made lifestyle changes. · You are thinking of trying a device such as CPAP. · You are having problems using a CPAP or similar machine. Where can you learn more? Go to Itugo. Enter A711 in the search box to learn more about \"Sleep Apnea: After Your Visit. \"   © 0513-7825 Healthwise, Incorporated. Care instructions adapted under license by [facility] [employer] (which disclaims liability or warranty for this information). This care instruction is for use with your licensed healthcare professional. If you have questions about a medical condition or this instruction, always ask your healthcare professional. Fernando Palencia any warranty or liability for your use of this information. PROPER SLEEP HYGIENE    What to avoid  · Do not have drinks with caffeine, such as coffee or black tea, for 8 hours before bed. · Do not smoke or use other types of tobacco near bedtime. Nicotine is a stimulant and can keep you awake. · Avoid drinking alcohol late in the evening, because it can cause you to wake in the middle of the night. · Do not eat a big meal close to bedtime. If you are hungry, eat a light snack. · Do not drink a lot of water close to bedtime, because the need to urinate may wake you up during the night. · Do not read or watch TV in bed. Use the bed only for sleeping and sexual activity. What to try  · Go to bed at the same time every night, and wake up at the same time every morning. Do not take naps during the day. · Keep your bedroom quiet, dark, and cool. · Get regular exercise, but not within 3 to 4 hours of your bedtime. .  · Sleep on a comfortable pillow and mattress.   · If watching the clock makes you anxious, turn it facing away from you so you cannot see the time. · If you worry when you lie down, start a worry book. Well before bedtime, write down your worries, and then set the book and your concerns aside. · Try meditation or other relaxation techniques before you go to bed. · If you cannot fall asleep, get up and go to another room until you feel sleepy. Do something relaxing. Repeat your bedtime routine before you go to bed again. · Make your house quiet and calm about an hour before bedtime. Turn down the lights, turn off the TV, log off the computer, and turn down the volume on music. This can help you relax after a busy day. Drowsy Driving  The [address] [place] [address] Road Traffic Safety Administration cites drowsiness as a causing factor in more than 691,879 police reported crashes annually, resulting in 76,000 injuries and 1,500 deaths. Other surveys suggest 55% of people polled have driven while drowsy in the past year, 23% had fallen asleep but not crashed, 3% crashed, and 2% had and accident due to drowsy driving. Who is at risk? Young Drivers: One study of drowsy driving accidents states that 55% of the drivers were under 25 years. Of those, 75% were male. Shift Workers and Travelers: People who work overnight or travel across time zones frequently are at higher risk of experiencing Circadian Rhythm Disorders. They are trying to work and function when their body is programed to sleep. Sleep Deprived: Lack of sleep has a serious impact on your ability to pay attention or focus on a task. Consistently getting less than the average of 8 hours your body needs creates partial or cumulative sleep deprivation. Untreated Sleep Disorders: Sleep Apnea, Narcolepsy, R.L.S., and other sleep disorders (untreated) prevent a person from getting enough restful sleep. This leads to excessive daytime sleepiness and increases the risk for drowsy driving accidents by up to 7 times.   Medications / Alcohol: Even over the counter medications can cause drowsiness. Medications that impair a drivers attention should have a warning label. Alcohol naturally makes you sleepy and on its own can cause accidents. Combined with excessive drowsiness its effects are amplified. Signs of Drowsy Driving:   * You don't remember driving the last few miles   * You may drift out of your mellissa   * You are unable to focus and your thoughts wander   * You may yawn more often than normal   * You have difficulty keeping your eyes open / nodding off   * Missing traffic signs, speeding, or tailgating  Prevention-   Good sleep hygiene, lifestyle and behavioral choices have the most impact on drowsy driving. There is no substitute for sleep and the average person requires 8 hours nightly. If you find yourself driving drowsy, stop and sleep. Consider the sleep hygiene tips provided during your visit as well. Medication Refill Policy: Refills for all medications require 1 week advance notice. Please have your pharmacy fax a refill request. We are unable to fax, or call in \"controled substance\" medications and you will need to pick these prescriptions up from our office. [employer] Activation    Thank you for requesting access to [facility]. Please follow the instructions below to securely access and download your online medical record. [facility] allows you to send messages to your doctor, view your test results, renew your prescriptions, schedule appointments, and more. How Do I Sign Up? 1. In your internet browser, go to [email or web address]. 2. Click on the First Time User? Click Here link in the Sign In box. You will see the New Member Sign Up page. 3. Enter your [facility] [employer] Access Code exactly as it appears below. You will not need to use this code after youve completed the sign-up process. If you do not sign up before the expiration date, you must request a new code.     [facility] [employer] Access Code: [email or web address]  Expires: 6/21/2019 11:27 AM (This is the date your [email or web address] access code will )    4. Enter the last four digits of your Social Security Number (xxxx) and Date of Birth (mm/dd/yyyy) as indicated and click Submit. You will be taken to the next sign-up page. 5. Create a [employer] ID. This will be your [email or web address] login ID and cannot be changed, so think of one that is secure and easy to remember. 6. Create a [email or web address] password. You can change your password at any time. 7. Enter your Password Reset Question and Answer. This can be used at a later time if you forget your password. 8. Enter your e-mail address. You will receive e-mail notification when new information is available in [address] 9. Click Sign Up. You can now view and download portions of your medical record. 10. Click the Download Summary menu link to download a portable copy of your medical information. Additional Information    If you have questions, please call [phone]. Remember, [email or web address] is NOT to be used for urgent needs. For medical emergencies, dial 911.

## 2019-05-07 NOTE — PROGRESS NOTES
217 [facility]., [place]. [place], [address]  Tel.  [phone]  Fax. 100 [facility] 60  [place], [address] [facility]  Tel.  [phone]  Fax. [phone] [address]   Tel.  [phone]  Fax. [phone]         Subjective:      Elyse Salas is an 37 y.o. female referred for evaluation for a sleep disorder. She complains of snoring, snorting, periods of not breathing associated with excessive daytime sleepiness. Symptoms began several years ago, gradually worsening since that time. She usually can fall asleep in a few minutes. Family or house members note snoring, snorting, periods of not breathing. She denies falling asleep while at work. Elyse Salas does wake up frequently at night. She   bothered by waking up too early and left unable to get back to sleep. She actually sleeps about   hours at night and wakes up about 2 times during the night. She does work shifts:  First Shift. Milli Licona indicates she does not get too little sleep at night. Her bedtime is 8-10 pm  . She awakens at 3:30 -5:30 am,  .   . She has the following observed behaviors:  ;  .  Other remarks:    Snoring, paues in breathin  She is a . She has 15year old twins   [place] Sleepiness Score: 19   which reflect severe daytime drowsiness. Allergies   Allergen Reactions    Morphine Hives    Pcn [Penicillins] Hives         Current Outpatient Medications:     hydroCHLOROthiazide (HYDRODIURIL) 12.5 mg tablet, Take 1 Tab by mouth daily. , Disp: 90 Tab, Rfl: 1    metFORMIN (GLUCOPHAGE) 500 mg tablet, Take 1 Tab by mouth daily (with dinner). , Disp: 90 Tab, Rfl: 1    aspirin delayed-release 81 mg tablet, Take  by mouth daily. , Disp: , Rfl:     glucose blood VI test strips (FREESTYLE LITE STRIPS) strip, Check fasting blood glucose daily. , Disp: 100 Strip, Rfl: 2    Blood-Glucose Meter monitoring kit, Check fasting blood glucose daily as directed. Please dispense Freestyle Lite meter, Disp: 1 Kit, Rfl: 0    Lancets misc, Test fasting blood glucose daily. , Disp: 100 Each, Rfl: 2    fluticasone (FLONASE) 50 mcg/actuation nasal spray, 1 spray each nostril BID  Indications: Allergic Rhinitis, Disp: 1 Bottle, Rfl: 0     She  has a past medical history of Anemia, Depression (2013), Diabetes ([facility] Essential hypertension, Hypertension, and IBS (irritable bowel syndrome). She also has no past medical history of Abnormal Pap smear, Asthma, Chlamydia, Complication of anesthesia, Genital herpes, unspecified, Gonorrhea, Heart abnormality, Herpes gestationis, Herpes simplex without mention of complication, Human immunodeficiency virus (HIV) disease ([facility] Infertility, female, Kidney disease, Liver disease, Phlebitis and thrombophlebitis of unspecified site, Postpartum depression, Rhesus isoimmunization unspecified as to episode of care in pregnancy, Sickle-cell disease, unspecified, Syphilis, Systemic lupus erythematosus ([facility] Thyroid activity decreased, Trauma, Unspecified breast disorder, Unspecified diseases of blood and blood-forming organs, or Unspecified epilepsy without mention of intractable epilepsy. She  has a past surgical history that includes pr  delivery only (). She family history includes Bleeding Prob in her mother; Breast Cancer in her paternal aunt; Cancer in her maternal grandmother; Diabetes in her father; Elevated Lipids in her father; Hypertension in her mother; Thyroid Disease in her mother; Uterine Cancer (age of onset: 77) in her maternal grandmother. She  reports that she has never smoked. She has never used smokeless tobacco. She reports that she drinks alcohol. She reports that she does not use drugs. Review of Systems:  Constitutional:  +weight gain. Eyes:  No blurred vision.   CVS:  No significant chest pain  Pulm:  No significant shortness of breath  GI:  Occasional nausea   :  No significant nocturia  Musculoskeletal:  No significant joint pain at night, occasional knee pain  Skin:  No significant rashes  Neuro:  No significant dizziness   Psych:  History of depression, symptoms controlled. Sleep Review of Systems: notable for no difficulty falling asleep; +frequent awakenings at night;  regular dreaming noted; no nightmares ; + early morning headaches; no memory problems; + concentration issues; no history of any automobile or occupational accidents due to daytime drowsiness. Objective:     Visit Vitals  BP (!) 130/95 (BP 1 Location: Left arm)   Pulse 89   Ht 5' 3\" (1.6 m)   Wt 233 lb 9.6 oz (106 kg)   SpO2 97%   BMI 41.38 kg/m²         General:   Not in acute distress   Eyes:  Anicteric sclerae, no obvious strabismus   Nose:  No obvious nasal septum deviation    Oropharynx:   Class 4 oropharyngeal outlet, thick tongue base, enlarged and boggy uvula, low-lying soft palate, narrow tonsilo-pharyngeal pilars   Tonsils:   tonsils are present and normal   Neck:   Neck circ. in \"inches\": 17; midline trachea   Chest/Lungs:  Equal lung expansion, clear on auscultation    CVS:  Normal rate, regular rhythm; no JVD   Skin:  Warm to touch; no obvious rashes   Neuro:  No focal deficits ; no obvious tremor    Psych:  Normal affect,  normal countenance;          Assessment:       ICD-10-CM ICD-9-CM    1. Obstructive sleep apnea (adult) (pediatric) G47.33 327.23 SLEEP STUDY UNATTENDED, 4 CHANNEL   2. Essential hypertension I10 401.9    3. Obesity, morbid ([facility] E66.01 278.01          Plan:     * The patient currently has a High Risk for having sleep apnea. STOP-BANG score 6.  * PSG was ordered for initial evaluation. I have reviewed the different types of sleep studies. Attended sleep studies and home sleep apnea tests. Home sleep testing tests only for the presence and severity of sleep apnea.  she understands that if the HSAT does not provide reliable result(such as poor data/failed HSAT recording), she may have to repeat the HSAT or come in for an attended polysomnogram.   * She was provided information on sleep apnea including coresponding risk factors and the importance of proper treatment. * Counseling was provided regarding proper sleep hygiene and safe driving. Treatment options for sleep apnea were reviewed. she is not against a trial of PAP if found to have significant sleep apnea. The treatment plan was reviewed with the patient in detail and reviewed with the patient and the lead technologist. she understands that the lead technologist will be calling her  with the results and assisting with the next step in the treatment plan as outlined today during the consultation with me. All of her questions were addressed. 2. Hypertension - she continues on her current regimen. I have reviewed the relationship between hypertension as it relates to sleep-disordered breathing. 3. Obesity - I have counseled the patient regarding the benefits of weight loss. I have reviewed/discussed the above normal BMI with the patient. I have recommended the following interventions: dietary management education, guidance, and counseling . Kurtis Jackson Thank you for allowing us to participate in your patient's medical care. We'll keep you updated on these investigations.   Electronically signed by    Abrahan Dawson MD  Diplomate in Sleep Medicine  [facility]

## 2019-05-09 ENCOUNTER — DOCUMENTATION ONLY (OUTPATIENT)
Dept: SLEEP MEDICINE | Age: 34
End: 2019-05-09

## 2019-05-13 ENCOUNTER — APPOINTMENT (OUTPATIENT)
Dept: GENERAL RADIOLOGY | Age: 34
End: 2019-05-13
Attending: EMERGENCY MEDICINE
Payer: MEDICAID

## 2019-05-13 ENCOUNTER — HOSPITAL ENCOUNTER (EMERGENCY)
Age: 34
Discharge: HOME | End: 2019-05-13
Attending: EMERGENCY MEDICINE
Payer: MEDICAID

## 2019-05-13 VITALS
DIASTOLIC BLOOD PRESSURE: 101 MMHG | RESPIRATION RATE: 16 BRPM | WEIGHT: 235 LBS | BODY MASS INDEX: 44.37 KG/M2 | OXYGEN SATURATION: 98 % | HEIGHT: 61 IN | SYSTOLIC BLOOD PRESSURE: 150 MMHG | HEART RATE: 91 BPM | TEMPERATURE: 98.2 F

## 2019-05-13 DIAGNOSIS — S52.125A CLOSED NONDISPLACED FRACTURE OF HEAD OF LEFT RADIUS, INITIAL ENCOUNTER: Primary | ICD-10-CM

## 2019-05-13 PROCEDURE — 74011250637 HC RX REV CODE- 250/637: Performed by: EMERGENCY MEDICINE

## 2019-05-13 PROCEDURE — 99283 EMERGENCY DEPT VISIT LOW MDM: CPT

## 2019-05-13 PROCEDURE — 75810000053 HC SPLINT APPLICATION

## 2019-05-13 PROCEDURE — 72050 X-RAY EXAM NECK SPINE 4/5VWS: CPT

## 2019-05-13 PROCEDURE — L3650 SO 8 ABD RESTRAINT PRE OTS: HCPCS

## 2019-05-13 PROCEDURE — 73090 X-RAY EXAM OF FOREARM: CPT

## 2019-05-13 RX ORDER — NAPROXEN 250 MG/1
500 TABLET ORAL
Status: COMPLETED | OUTPATIENT
Start: 2019-05-13 | End: 2019-05-13

## 2019-05-13 RX ORDER — CYCLOBENZAPRINE HCL 10 MG
10 TABLET ORAL
Status: COMPLETED | OUTPATIENT
Start: 2019-05-13 | End: 2019-05-13

## 2019-05-13 RX ORDER — CYCLOBENZAPRINE HCL 10 MG
10 TABLET ORAL
Qty: 20 TAB | Refills: 0 | Status: SHIPPED | OUTPATIENT
Start: 2019-05-13 | End: 2019-08-12

## 2019-05-13 RX ADMIN — CYCLOBENZAPRINE HYDROCHLORIDE 10 MG: 10 TABLET, FILM COATED ORAL at 20:25

## 2019-05-13 RX ADMIN — NAPROXEN 500 MG: 250 TABLET ORAL at 20:25

## 2019-05-13 NOTE — LETTER
21 [facility] EMERGENCY DEPT 
[address] [place] [address] Dinorah Ruiz [address] [id number] 
[phone] Work/School Note Date: 5/13/2019 To Whom It May concern: 
 
James Angela was seen and treated today in the emergency room by the following provider(s): 
Attending Provider: Shavonne Mckay DO. James Angela may return to work after being cleared by ortho doctor. Sincerely, Lizette Diop DO

## 2019-05-13 NOTE — ED TRIAGE NOTES
Patient presents ambulatory to treatment area with a steady gait. Patient complains of left arm pain that began when playing with her child and falling today about 40mins PTA. Patient states when she fell, she landed on the arm. Patient complains of pain throughout the arm. Pulses intact. Limited ROM secondary to pain.

## 2019-05-14 NOTE — ED PROVIDER NOTES
Patient complains of left arm pain that began when playing with her child and falling today about 40mins PTA. Patient states when she fell, she landed on the arm. Patient complains of pain throughout the arm. Limited ROM secondary to pain. The history is provided by the patient. No  was used. Arm Pain This is a new problem. The current episode started less than 1 hour ago. The problem occurs constantly. The problem has not changed since onset. The pain is present in the left arm and left elbow. The quality of the pain is described as aching and constant. The pain is at a severity of 8/10. The pain is moderate. Associated symptoms include limited range of motion and stiffness. Pertinent negatives include no numbness, no tingling, no itching, no back pain and no neck pain. The symptoms are aggravated by palpation and movement. She has tried nothing for the symptoms. There has been a history of trauma. Past Medical History:  
Diagnosis Date  Anemia  Depression 5/20/2013  Diabetes ([address]  Essential hypertension  Hypertension  IBS (irritable bowel syndrome) Past Surgical History:  
Procedure Laterality Date [facility] 812 ONLY  2005 TWins at 35 wks; IOL; NRFS Family History:  
Problem Relation Age of Onset  Hypertension Mother  Bleeding Prob Mother   
     blood clots  Thyroid Disease Mother  Diabetes Father  Elevated Lipids Father  Breast Cancer Paternal Aunt   
     breast  
 Cancer Maternal Grandmother   
     cervix  Uterine Cancer Maternal Grandmother 77 Hysterectomy and chemo  Colon Cancer Neg Hx   
 Ovarian Cancer Neg Hx Social History Socioeconomic History  Marital status: SINGLE Spouse name: Not on file  Number of children: Not on file  Years of education: Not on file  Highest education level: Not on file Occupational History  Not on file Social Needs  Financial resource strain: Not on file  Food insecurity:  
  Worry: Not on file Inability: Not on file  Transportation needs:  
  Medical: Not on file Non-medical: Not on file Tobacco Use  Smoking status: Never Smoker  Smokeless tobacco: Never Used Substance and Sexual Activity  Alcohol use: Yes Comment: occas  Drug use: No  
 Sexual activity: Yes  
  Partners: Male Birth control/protection: None, Implant Comment: KJ;   
Lifestyle  Physical activity:  
  Days per week: Not on file Minutes per session: Not on file  Stress: Not on file Relationships  Social connections:  
  Talks on phone: Not on file Gets together: Not on file Attends [employer] service: Not on file Active member of club or organization: Not on file Attends meetings of clubs or organizations: Not on file Relationship status: Not on file  Intimate partner violence:  
  Fear of current or ex partner: Not on file Emotionally abused: Not on file Physically abused: Not on file Forced sexual activity: Not on file Other Topics Concern  Not on file Social History Narrative  Not on file ALLERGIES: Morphine and Pcn [penicillins] Review of Systems Constitutional: Negative for appetite change, chills, fever and unexpected weight change. HENT: Negative for ear pain, hearing loss, rhinorrhea and trouble swallowing. Eyes: Negative for pain and visual disturbance. Respiratory: Negative for cough, chest tightness and shortness of breath. Cardiovascular: Negative for chest pain and palpitations. Gastrointestinal: Negative for abdominal distention, abdominal pain, blood in stool and vomiting. Genitourinary: Negative for dysuria, hematuria and urgency. Musculoskeletal: Positive for stiffness. Negative for back pain, myalgias and neck pain. Skin: Negative for itching and rash. Neurological: Negative for dizziness, tingling, syncope, weakness and numbness. Psychiatric/Behavioral: Negative for confusion and suicidal ideas. All other systems reviewed and are negative. Vitals:  
 05/13/19 2002 05/13/19 2230 BP: (!) 160/97 (!) 150/101 Pulse: 95 91 Resp: 16 16 Temp: 98.2 °F (36.8 °C) SpO2: 100% 98% Weight: 106.6 kg (235 lb) Height: 5' 1\" (1.549 m) Physical Exam  
Constitutional: She is oriented to person, place, and time. She appears well-developed and well-nourished. No distress. HENT:  
Head: Normocephalic and atraumatic. Right Ear: External ear normal.  
Left Ear: External ear normal.  
Nose: Nose normal.  
Mouth/Throat: Oropharynx is clear and moist. No oropharyngeal exudate. Eyes: Pupils are equal, round, and reactive to light. Conjunctivae and EOM are normal. Right eye exhibits no discharge. Left eye exhibits no discharge. No scleral icterus. Neck: Normal range of motion. Neck supple. No JVD present. No tracheal deviation present. Cardiovascular: Normal rate, regular rhythm, normal heart sounds and intact distal pulses. Exam reveals no gallop and no friction rub. No murmur heard. Pulmonary/Chest: Effort normal and breath sounds normal. No stridor. No respiratory distress. She has no decreased breath sounds. She has no wheezes. She has no rhonchi. She has no rales. She exhibits no tenderness. Abdominal: Soft. Bowel sounds are normal. She exhibits no distension. There is no tenderness. There is no rebound and no guarding. Musculoskeletal: She exhibits no edema. Left elbow: She exhibits decreased range of motion. She exhibits no swelling, no effusion, no deformity and no laceration. Tenderness found. Radial head tenderness noted. Left forearm: She exhibits tenderness. She exhibits no bony tenderness, no swelling, no edema, no deformity and no laceration. Neurological: She is alert and oriented to person, place, and time. She has normal strength and normal reflexes. She displays normal reflexes. No cranial nerve deficit or sensory deficit. She exhibits normal muscle tone. Coordination normal. GCS eye subscore is 4. GCS verbal subscore is 5. GCS motor subscore is 6. Skin: Skin is warm and dry. Capillary refill takes less than 2 seconds. No rash noted. She is not diaphoretic. No erythema. No pallor. Psychiatric: She has a normal mood and affect. Her behavior is normal. Judgment and thought content normal.  
Nursing note and vitals reviewed. MDM Number of Diagnoses or Management Options Closed nondisplaced fracture of head of left radius, initial encounter:  
  
Amount and/or Complexity of Data Reviewed Tests in the radiology section of CPT®: ordered and reviewed Risk of Complications, Morbidity, and/or Mortality Presenting problems: moderate Diagnostic procedures: low Management options: moderate Patient Progress Patient progress: stable Procedures Chief Complaint Patient presents with  Arm Pain The patient's presenting problems have been discussed, and they are in agreement with the care plan formulated and outlined with them. I have encouraged them to ask questions as they arise throughout their visit. MEDICATIONS GIVEN: 
Medications  
naproxen (NAPROSYN) tablet 500 mg (500 mg Oral Given 5/13/19 2025) cyclobenzaprine (FLEXERIL) tablet 10 mg (10 mg Oral Given 5/13/19 2025) LABS REVIEWED: 
No results found for this or any previous visit (from the past 24 hour(s)). VITAL SIGNS: 
Patient Vitals for the past 12 hrs: 
 Temp Pulse Resp BP SpO2  
05/13/19 2230  91 16 (!) 150/101 98 % 05/13/19 2002 98.2 °F (36.8 °C) 95 16 (!) 160/97 100 % RADIOLOGY RESULTS: 
The following have been ordered and reviewed: 
Xr Spine Cerv 4 Or 5 V Result Date: 5/13/2019 Indication: Status post fall with acute neck pain Five views of the cervical spine demonstrate normal alignment without evidence of acute fracture, subluxation, or prevertebral edema. Impression: No acute abnormality. Xr Forearm Lt Ap/lat Result Date: 5/13/2019 EXAM: XR FOREARM LT AP/LAT INDICATION: fall. Acute left arm pain COMPARISON: None. FINDINGS: Two views of the left radius and ulna demonstrate an impaction fracture of the radial neck with normal alignment. There is a small joint effusion. IMPRESSION: Impaction fracture radial neck with joint effusion. PROGRESS NOTES: 
Discussed results and plan with patient. Patient will be discharged home with ortho follow up. Patient instructed to return to the emergency room for any worsening symptoms or any other concerns. DIAGNOSIS: 
 
1. Closed nondisplaced fracture of head of left radius, initial encounter PLAN: 
Follow-up Information Follow up With Specialties Details Why Contact Info Remigio Alonzo MD Orthopedic Surgery Schedule an appointment as soon as possible for a visit  [address] [facility] 
[phone] [facility] EMERGENCY DEPT Emergency Medicine  If symptoms worsen [place] [address] [address] [facility] 
[phone] Discharge Medication List as of 5/13/2019  9:55 PM  
  
START taking these medications Details  
cyclobenzaprine (FLEXERIL) 10 mg tablet Take 1 Tab by mouth three (3) times daily as needed for Muscle Spasm(s). , Print, Disp-20 Tab, R-0  
  
  
CONTINUE these medications which have NOT CHANGED Details  
hydroCHLOROthiazide (HYDRODIURIL) 12.5 mg tablet Take 1 Tab by mouth daily. , Normal, Disp-90 Tab, R-1  
  
metFORMIN (GLUCOPHAGE) 500 mg tablet Take 1 Tab by mouth daily (with dinner). , Normal, Disp-90 Tab, R-1  
  
aspirin delayed-release 81 mg tablet Take  by mouth daily. , Historical Med  
  
 fluticasone (FLONASE) 50 mcg/actuation nasal spray 1 spray each nostril BID  Indications: Allergic Rhinitis, Normal, Disp-1 Bottle, R-0  
  
glucose blood VI test strips (FREESTYLE LITE STRIPS) strip Check fasting blood glucose daily. , Normal, Disp-100 Strip, R-2 Blood-Glucose Meter monitoring kit Check fasting blood glucose daily as directed. Please dispense Freestyle Lite meter, Normal, Disp-1 Kit, R-0 Lancets misc Test fasting blood glucose daily. , Normal, Disp-100 Each, R-2 ED COURSE: The patient's hospital course has been uncomplicated.

## 2019-05-14 NOTE — ED NOTES
Patient medicated for pain as ordered. Patient tolerated PO flexeril and naproxen well. Patient updated regarding plan of care and associated time constraints; verbalizes understanding and agreement. Call bell in reach. Family at bedside.

## 2019-05-14 NOTE — DISCHARGE INSTRUCTIONS
We hope that we have addressed all of your medical concerns. The examination and treatment you received in the Emergency Department were for an emergent problem and were not intended as complete care. It is important that you follow up with your healthcare provider(s) for ongoing care. If your symptoms worsen or do not improve as expected, and you are unable to reach your usual health care provider(s), you should return to the Emergency Department. Today's healthcare is undergoing tremendous change, and patient satisfaction surveys are one of the many tools to assess the quality of medical care. You may receive a survey from the [employer] regarding your experience in the Emergency Department. I hope that your experience has been completely positive, particularly the medical care that I provided. As such, please participate in the survey; anything less than excellent does not meet my expectations or intentions. Formerly [facility] and [address] [place] [address] participate in nationally recognized quality of care measures. If your blood pressure is greater than 120/80, as reported below, we urge that you seek medical care to address the potential of high blood pressure, commonly known as hypertension. Hypertension can be hereditary or can be caused by certain medical conditions, pain, stress, or \"white coat syndrome. \"       Please make an appointment with your health care provider(s) for follow up of your Emergency Department visit. VITALS:   Patient Vitals for the past 8 hrs:   Temp Pulse Resp BP SpO2   05/13/19 2002 98.2 °F (36.8 °C) 95 16 (!) 160/97 100 %          Thank you for allowing us to provide you with medical care today. We realize that you have many choices for your emergency care needs. Please choose us in the future for any continued health care needs. Chong García, [address] [place], [place] [address]y 20. Office: [phone]            No results found for this or any previous visit (from the past 24 hour(s)). Xr Spine Cerv 4 Or 5 V    Result Date: 5/13/2019  Indication: Status post fall with acute neck pain Five views of the cervical spine demonstrate normal alignment without evidence of acute fracture, subluxation, or prevertebral edema. Impression: No acute abnormality. Xr Forearm Lt Ap/lat    Result Date: 5/13/2019  EXAM: XR FOREARM LT AP/LAT INDICATION: fall. Acute left arm pain COMPARISON: None. FINDINGS: Two views of the left radius and ulna demonstrate an impaction fracture of the radial neck with normal alignment. There is a small joint effusion. IMPRESSION: Impaction fracture radial neck with joint effusion. Patient Education        Broken Radial Head of the Elbow: Care Instructions  Your Care Instructions  The radius is one of the two long bones in your lower arm. The radial head is the small part of this bone near the elbow. This bone may break (fracture) during sports or an accident. It may happen when your arm is hit or is used to protect you in a fall. Fractures can range from a small, hairline crack to a bone that is broken into two or more pieces. Your treatment depends on how bad the break is. Your doctor may have put your arm in a cast or splint. This will allow your elbow to heal or will keep it stable until you see another doctor. You also might wear a sling to help support your arm. It may take weeks or months for your elbow to heal. You can help it heal with some care at home. You heal best when you take good care of yourself. Eat a variety of healthy foods, and don't smoke. You may have had a sedative to help you relax. You may be unsteady after having sedation. It can take a few hours for the medicine's effects to wear off. Common side effects of sedation include nausea, vomiting, and feeling sleepy or tired.   The doctor has checked you carefully, but problems can develop later. If you notice any problems or new symptoms, get medical treatment right away. Follow-up care is a key part of your treatment and safety. Be sure to make and go to all appointments, and call your doctor if you are having problems. It's also a good idea to know your test results and keep a list of the medicines you take. How can you care for yourself at home? · If the doctor gave you a sedative:  ? For 24 hours, don't do anything that requires attention to detail. It takes time for the medicine's effects to completely wear off.  ? For your safety, do not drive or operate any machinery that could be dangerous. Wait until the medicine wears off and you can think clearly and react easily. · Put ice or a cold pack on your arm for 10 to 20 minutes at a time. Try to do this every 1 to 2 hours for the next 3 days (when you are awake). Put a thin cloth between the ice and your cast or splint. Keep your cast or splint dry. · Follow the cast care instructions your doctor gives you. If you have a splint, do not take it off unless your doctor tells you to. · Be safe with medicines. Read and follow all instructions on the label. ? If the doctor gave you a prescription medicine for pain, take it as prescribed. ? If you are not taking a prescription pain medicine, ask your doctor if you can take an over-the-counter medicine. · Prop up the sore arm on a pillow when you ice it or anytime you sit or lie down during the next 3 days. Try to keep it above the level of your heart. This will help reduce swelling. · Follow instructions for exercises to keep your arm strong. · Wiggle your fingers and wrist often to reduce swelling and stiffness. When should you call for help?   Call your doctor now or seek immediate medical care if:    · You have new or worse pain.     · Your hand or fingers are cool or pale or change color.     · Your cast or splint feels too tight.     · You have tingling, weakness, or numbness in your hand or fingers.    Watch closely for changes in your health, and be sure to contact your doctor if:    · You do not get better as expected.     · You have problems with your cast or splint. Where can you learn more? Go to http://salvatore-elton.info/. Enter [id number] in the search box to learn more about \"Broken Radial Head of the Elbow: Care Instructions. \"  Current as of: September 20, 2018  Content Version: 11.9  © 1727-5680 [employer]. Care instructions adapted under license by [employer] (which disclaims liability or warranty for this information). If you have questions about a medical condition or this instruction, always ask your healthcare professional. [address] any warranty or liability for your use of this information.

## 2019-05-14 NOTE — ED NOTES
The pt was discharged home by Dr. Ruthanne Leyden following splint application. The pt verbalizes understanding of splint care. The pt received one prescription and one work note. The pt was ambulatory on discharge with family.

## 2019-05-15 ENCOUNTER — OFFICE VISIT (OUTPATIENT)
Dept: SLEEP MEDICINE | Age: 34
End: 2019-05-15

## 2019-05-15 VITALS
WEIGHT: 233 LBS | HEIGHT: 61 IN | SYSTOLIC BLOOD PRESSURE: 136 MMHG | BODY MASS INDEX: 43.99 KG/M2 | OXYGEN SATURATION: 99 % | HEART RATE: 92 BPM | DIASTOLIC BLOOD PRESSURE: 92 MMHG

## 2019-05-15 DIAGNOSIS — G47.33 OSA (OBSTRUCTIVE SLEEP APNEA): Primary | ICD-10-CM

## 2019-05-15 NOTE — PROGRESS NOTES
[address] [facility] [place]. [place], [address]  Tel.  [phone]  Fax. 100 [facility] 60  [place], [address] [facility]  Tel.  [phone]  Fax. [phone] [address] [facility] [address]  Tel.  [phone]  Fax. [phone]       S>Delilah Rasmussen is a 37 y.o. female seen today to receive a home sleep testing unit (HST). · Patient was educated on proper hookup and operation of the HST. · Instruction forms and documentation were reviewed and signed. · The patient demonstrated good understanding of the HST. O>    Visit Vitals  BP (!) 136/92   Pulse 92   Ht 5' 1\" (1.549 m)   Wt 233 lb (105.7 kg)   SpO2 99%   BMI 44.02 kg/m²         A>  1. JANE (obstructive sleep apnea)          P>  · General information regarding operations and maintenance of the device was provided. · She was provided information on sleep apnea including coresponding risk factors and the importance of proper treatment. · Follow-up appointment was made to return the HST. She will be contacted once the results have been reviewed. · She was asked to contact our office for any problems regarding her home sleep test study.

## 2019-07-19 ENCOUNTER — HOSPITAL ENCOUNTER (EMERGENCY)
Age: 34
Discharge: HOME | End: 2019-07-19
Attending: EMERGENCY MEDICINE | Admitting: EMERGENCY MEDICINE
Payer: MEDICAID

## 2019-07-19 VITALS
RESPIRATION RATE: 16 BRPM | HEART RATE: 93 BPM | BODY MASS INDEX: 46.01 KG/M2 | HEIGHT: 60 IN | TEMPERATURE: 98.8 F | WEIGHT: 234.35 LBS | OXYGEN SATURATION: 99 % | DIASTOLIC BLOOD PRESSURE: 97 MMHG | SYSTOLIC BLOOD PRESSURE: 172 MMHG

## 2019-07-19 DIAGNOSIS — K08.89 DENTALGIA: Primary | ICD-10-CM

## 2019-07-19 PROCEDURE — 75810000283 HC INJECTION NERVE BLOCK

## 2019-07-19 PROCEDURE — 74011250637 HC RX REV CODE- 250/637: Performed by: EMERGENCY MEDICINE

## 2019-07-19 PROCEDURE — 99283 EMERGENCY DEPT VISIT LOW MDM: CPT

## 2019-07-19 PROCEDURE — 74011000250 HC RX REV CODE- 250: Performed by: EMERGENCY MEDICINE

## 2019-07-19 RX ORDER — BUPIVACAINE HYDROCHLORIDE 5 MG/ML
5 INJECTION, SOLUTION EPIDURAL; INTRACAUDAL ONCE
Status: COMPLETED | OUTPATIENT
Start: 2019-07-19 | End: 2019-07-19

## 2019-07-19 RX ORDER — ACETAMINOPHEN 500 MG
1000 TABLET ORAL
COMMUNITY
End: 2020-05-28

## 2019-07-19 RX ORDER — CLINDAMYCIN HYDROCHLORIDE 300 MG/1
300 CAPSULE ORAL 4 TIMES DAILY
Qty: 40 CAP | Refills: 0 | Status: SHIPPED | OUTPATIENT
Start: 2019-07-19 | End: 2019-08-12 | Stop reason: ALTCHOICE

## 2019-07-19 RX ORDER — CLINDAMYCIN HYDROCHLORIDE 150 MG/1
300 CAPSULE ORAL
Status: COMPLETED | OUTPATIENT
Start: 2019-07-19 | End: 2019-07-19

## 2019-07-19 RX ADMIN — BENZOCAINE: 200 SPRAY DENTAL; ORAL; PERIODONTAL at 08:47

## 2019-07-19 RX ADMIN — CLINDAMYCIN HYDROCHLORIDE 300 MG: 150 CAPSULE ORAL at 08:46

## 2019-07-19 RX ADMIN — BUPIVACAINE HYDROCHLORIDE 5 MG: 5 INJECTION, SOLUTION EPIDURAL; INTRACAUDAL; PERINEURAL at 08:47

## 2019-07-19 NOTE — ED TRIAGE NOTES
Pt presents to ED with c/o dental abcess over the past month. Pt finished course of antibiotics one week ago but is having continued pain with pain into right maxilla. Pt states fever to 101. Pt is afebrile currently.

## 2019-07-19 NOTE — ED PROVIDER NOTES
'saw my dentist last month/ was on amoxicillin x 10 days (just finished)/ having dental pain again x 4 days/ assoc w/ fevers to 102 x 2 days; has not taken anything for the pain/ fever today; has a dentist in [place];     pt denies trauma, facial swelling, vison changes, diff swallowing, CP, SOB, Abd pain, Chills, N/V, D/Cons or other current systemic complaints    The history is provided by the patient. Dental Pain    This is a recurrent problem. The current episode started more than 1 week ago. The problem has been gradually worsening. The pain is located in the right upper mouth. The pain is mild. Associated symptoms include swelling. There was no vomiting, no nausea, no fever, no chest pain, no shortness of breath, no headaches, no gum redness and no drainage. Treatments tried: amoxicillin. The treatment provided moderate relief. The patient has no cardiac history.        Past Medical History:   Diagnosis Date    Anemia     Depression 2013    Diabetes ([facility] [address]     Essential hypertension     Hypertension     IBS (irritable bowel syndrome)        Past Surgical History:   Procedure Laterality Date     DELIVERY ONLY      TWins at 28 wks; IOL; NRFS         Family History:   Problem Relation Age of Onset    Hypertension Mother     Bleeding Prob Mother         blood clots    Thyroid Disease Mother     Diabetes Father    [facility] Elevated Lipids Father     Breast Cancer Paternal Aunt         breast    Cancer Maternal Grandmother         cervix    Uterine Cancer Maternal Grandmother 66        Hysterectomy and chemo    Colon Cancer Neg Hx     Ovarian Cancer Neg Hx        Social History     Socioeconomic History    Marital status: SINGLE     Spouse name: Not on file    Number of children: Not on file    Years of education: Not on file    Highest education level: Not on file   Occupational History    Not on file   Social Needs    Financial resource strain: Not on file    Food insecurity: Worry: Not on file     Inability: Not on file    Transportation needs:     Medical: Not on file     Non-medical: Not on file   Tobacco Use    Smoking status: Never Smoker    Smokeless tobacco: Never Used   Substance and Sexual Activity    Alcohol use: Yes     Comment: occas    Drug use: No    Sexual activity: Yes     Partners: Male     Birth control/protection: None, Implant     Comment: KJ;    Lifestyle    Physical activity:     Days per week: Not on file     Minutes per session: Not on file    Stress: Not on file   Relationships    Social connections:     Talks on phone: Not on file     Gets together: Not on file     Attends Druze service: Not on file     Active member of club or organization: Not on file     Attends meetings of clubs or organizations: Not on file     Relationship status: Not on file    Intimate partner violence:     Fear of current or ex partner: Not on file     Emotionally abused: Not on file     Physically abused: Not on file     Forced sexual activity: Not on file   Other Topics Concern    Not on file   Social History Narrative    Not on file         ALLERGIES: Morphine and Pcn [penicillins]    Review of Systems   Constitutional: Positive for fever. Negative for activity change, appetite change and chills. HENT: Positive for dental problem. Negative for ear discharge, ear pain, facial swelling, rhinorrhea, sore throat, trouble swallowing and voice change. Eyes: Negative for photophobia and redness. Respiratory: Negative for chest tightness and shortness of breath. Cardiovascular: Negative for chest pain, palpitations and leg swelling. Genitourinary: Negative for dysuria. Skin: Negative for rash. All other systems reviewed and are negative.       Vitals:    07/19/19 0825   BP: (!) 172/97   Pulse: 93   Resp: 16   Temp: 98.8 °F (37.1 °C)   SpO2: 99%   Weight: 106.3 kg (234 lb 5.6 oz)   Height: 5' (1.524 m)            Physical Exam   Constitutional: She is oriented to person, place, and time. She appears well-developed and well-nourished. NAD, AxOx4, speaking in complete sentences     HENT:   Head: Normocephalic and atraumatic. Head is without raccoon's eyes, without laceration, without right periorbital erythema and without left periorbital erythema. Right Ear: Hearing and external ear normal. No mastoid tenderness. Left Ear: Hearing and external ear normal. No mastoid tenderness. Nose: Nose normal. No rhinorrhea. No epistaxis. Mouth/Throat: Uvula is midline, oropharynx is clear and moist and mucous membranes are normal. No oral lesions. No trismus in the jaw. Abnormal dentition. Dental caries present. No dental abscesses, uvula swelling or lacerations. No oropharyngeal exudate, posterior oropharyngeal edema, posterior oropharyngeal erythema or tonsillar abscesses. Tonsils are 1+ on the right. Tonsils are 1+ on the left. No tonsillar exudate. Eyes: Pupils are equal, round, and reactive to light. Conjunctivae and EOM are normal. Right eye exhibits no discharge. Left eye exhibits no discharge. No scleral icterus. Neck: Normal range of motion. Neck supple. No JVD present. No tracheal deviation present. Cardiovascular: Normal rate, regular rhythm and normal heart sounds. Exam reveals no gallop and no friction rub. No murmur heard. Pulmonary/Chest: Effort normal and breath sounds normal. No respiratory distress. She has no wheezes. She has no rales. She exhibits no tenderness. Abdominal: Soft. Bowel sounds are normal. There is no tenderness. There is no rebound and no guarding. Genitourinary: No vaginal discharge found. Musculoskeletal: Normal range of motion. She exhibits no edema, tenderness or deformity. Neurological: She is alert and oriented to person, place, and time. She displays normal reflexes. No cranial nerve deficit or sensory deficit. She exhibits normal muscle tone. Coordination normal.   Skin: Skin is warm and dry. No rash noted.  No erythema. No pallor. Psychiatric: She has a normal mood and affect. Her behavior is normal. Thought content normal.   Nursing note and vitals reviewed. MDM       Nerve Block  Date/Time: 7/19/2019 8:46 AM  Performed by: Elieser Zaragoza MD  Authorized by: Elieser Zaragoza MD     Consent:     Consent obtained:  Verbal    Consent given by:  Patient    Risks discussed: Allergic reaction, bleeding, intravenous injection, infection, nerve damage, pain, unsuccessful block and swelling    Alternatives discussed:  No treatment  Indications:     Indications:  Pain relief  Location:     Body area:  Head    Head nerve blocked: apical dental block. Laterality:  Right  Skin anesthesia (see MAR for exact dosages):     Skin anesthesia method:  Local infiltration  Procedure details (see MAR for exact dosages): Block needle gauge:  25 G    Anesthetic injected:  Bupivacaine 0.5% w/o epi    Steroid injected:  None    Additive injected:  None    Injection procedure:  Anatomic landmarks identified    Paresthesia:  Immediately resolved  Post-procedure details:     Dressing:  None    Outcome:  Anesthesia achieved    Patient tolerance of procedure: Tolerated well, no immediate complications      2:51 AM  Placido Callahan's  results have been reviewed with her. She has been counseled regarding her diagnosis. She verbally conveys understanding and agreement of the signs, symptoms, diagnosis, treatment and prognosis and additionally agrees to Call/ Arrange follow up as recommended with her DENTIST/ Dr. Otto Fish MD in 24 - 48 hours. She also agrees with the care-plan and conveys that all of her questions have been answered.   I have also put together some discharge instructions for her that include: 1) educational information regarding their diagnosis, 2) how to care for their diagnosis at home, as well a 3) list of reasons why they would want to return to the ED prior to their follow-up appointment, should their condition change or for concerns.

## 2019-07-19 NOTE — LETTER
[address] [facility] EMERGENCY DEPT 
[address] [facility] [id number] 
[phone] Work/School Note Date: 7/19/2019 To Whom It May concern: 
 
Ladonna Prado was seen and treated today in the emergency room by the following provider(s): 
Attending Provider: Ab Hung MD.   
 
 
 
Sincerely, 
 
 
 
 
Maximiliano De Leon MD

## 2019-07-19 NOTE — DISCHARGE INSTRUCTIONS
Patient Education        Periodontal Conditions: Care Instructions  Your Care Instructions    Periodontal conditions affect the gums, bone, and tissue that surround and support the teeth. The most common problems are caused by plaque. Plaque is a thin film of bacteria that sticks to teeth above and below the gum line. It can build up and harden into tartar. The bacteria in plaque and tartar can cause gum disease. Gingivitis is a disease that affects the gums (gingiva). The gums are the soft tissue that surrounds the teeth. Gingivitis causes red, swollen, tender gums that bleed easily when brushed, persistent bad breath, and sensitive teeth. Because it is not painful, many people do not get treatment when they should. Gingivitis can be reversed with good dental care. Periodontitis is a more advanced disease that affects more than the gums. The gums pull away from the teeth. This leaves deep pockets where bacteria can grow. The disease can damage the bones that support the teeth. The teeth may get loose and fall out. A periodontal condition should be treated as soon as it is found. Finding gum problems early, treating them right away, and having regular checkups bring the best results. You can treat mild periodontal conditions by brushing and flossing your teeth every day. Your dentist may prescribe a mouthwash to kill the bacteria that can damage teeth and gums. Your dentist may have you take antibiotics to treat infection from moderate periodontal disease. If your gums have pulled away from your teeth, you may need cleaning between the teeth and gums right down to the teeth roots. This is called root planing and scaling. If you have severe periodontal disease, you may need surgery to remove diseased gum tissue or repair bone damage. Follow-up care is a key part of your treatment and safety. Be sure to make and go to all appointments, and call your dentist if you are having problems.  It's also a good idea to know your test results and keep a list of the medicines you take. How can you care for yourself at home? · If your dentist prescribed antibiotics, take them as directed. Do not stop taking them just because you feel better. You need to take the full course of antibiotics. · Brush your teeth twice a day, in the morning and at night. ? Use a toothbrush with soft, rounded-end bristles and a head that is small enough to reach all parts of your teeth and mouth. Replace your toothbrush every 3 to 4 months. ? Use a fluoride toothpaste. ? Place the brush at a 45-degree angle where the teeth meet the gums. Press firmly, and gently rock the brush back and forth using small circular movements. ? Brush chewing surfaces vigorously with short back-and-forth strokes. ? Brush your tongue from back to front. · Floss at least once a day. Choose the type and flavor that you like best.  · Have your teeth cleaned by a professional at least twice a year. · Ask your dentist about using an antibacterial mouthwash to help reduce bacteria. · Rinse your mouth with water or chew sugar-free gum after meals if you can't brush your teeth. · Do not smoke or use smokeless tobacco. Tobacco use can cause periodontal disease. When should you call for help? Call your dentist now or seek immediate medical care if:    · You have symptoms of infection, such as:  ? Increased pain, swelling, warmth, or redness. ? Red streaks leading from the area. ? Pus draining from the area. ? A fever.    Watch closely for changes in your health, and be sure to contact your dentist if:    · You have new or worse tooth pain.     · You do not get better as expected. Where can you learn more? Go to http://salvatore-etlon.info/. Enter E457 in the search box to learn more about \"Periodontal Conditions: Care Instructions. \"  Current as of: March 27, 2018  Content Version: 11.9  © 2555-1024 [employer].  Care instructions adapted under license by [employer] (which disclaims liability or warranty for this information). If you have questions about a medical condition or this instruction, always ask your healthcare professional. [address] any warranty or liability for your use of this information.

## 2019-08-12 ENCOUNTER — OFFICE VISIT (OUTPATIENT)
Dept: FAMILY MEDICINE CLINIC | Age: 34
End: 2019-08-12

## 2019-08-12 VITALS
RESPIRATION RATE: 18 BRPM | HEART RATE: 106 BPM | SYSTOLIC BLOOD PRESSURE: 116 MMHG | BODY MASS INDEX: 45.16 KG/M2 | HEIGHT: 60 IN | WEIGHT: 230 LBS | TEMPERATURE: 98.5 F | OXYGEN SATURATION: 98 % | DIASTOLIC BLOOD PRESSURE: 78 MMHG

## 2019-08-12 DIAGNOSIS — R73.03 PRE-DIABETES: ICD-10-CM

## 2019-08-12 DIAGNOSIS — R42 POSTURAL DIZZINESS: Primary | ICD-10-CM

## 2019-08-12 DIAGNOSIS — I10 ESSENTIAL HYPERTENSION: ICD-10-CM

## 2019-08-12 DIAGNOSIS — M25.562 ACUTE PAIN OF LEFT KNEE: ICD-10-CM

## 2019-08-12 DIAGNOSIS — R60.0 PEDAL EDEMA: ICD-10-CM

## 2019-08-12 LAB
BILIRUB UR QL STRIP: NEGATIVE
GLUCOSE UR-MCNC: NEGATIVE MG/DL
KETONES P FAST UR STRIP-MCNC: ABNORMAL MG/DL
PH UR STRIP: 5.5 [PH] (ref 4.6–8)
PROT UR QL STRIP: NEGATIVE
SP GR UR STRIP: 1.02 (ref 1–1.03)
UA UROBILINOGEN AMB POC: ABNORMAL (ref 0.2–1)
URINALYSIS CLARITY POC: CLEAR
URINALYSIS COLOR POC: YELLOW
URINE BLOOD POC: ABNORMAL
URINE LEUKOCYTES POC: NEGATIVE
URINE NITRITES POC: NEGATIVE

## 2019-08-12 NOTE — PROGRESS NOTES
Identified pt with two pt identifiers(name and ). Chief Complaint   Patient presents with    Dizziness    Foot Swelling     Symptoms began about 2 weeks ago    Ankle swelling        There are no preventive care reminders to display for this patient. Wt Readings from Last 3 Encounters:   19 230 lb (104.3 kg)   19 234 lb 5.6 oz (106.3 kg)   05/15/19 233 lb (105.7 kg)     Temp Readings from Last 3 Encounters:   19 98.5 °F (36.9 °C) (Oral)   19 98.8 °F (37.1 °C)   19 98.2 °F (36.8 °C)     BP Readings from Last 3 Encounters:   19 116/78   19 (!) 172/97   05/15/19 (!) 136/92     Pulse Readings from Last 3 Encounters:   19 (!) 106   19 93   05/15/19 92         Learning Assessment:  :     Learning Assessment 2016   PRIMARY LEARNER Patient   HIGHEST LEVEL OF EDUCATION - PRIMARY LEARNER  [employer]   BARRIERS PRIMARY LEARNER NONE   CO-LEARNER CAREGIVER No   PRIMARY LANGUAGE ENGLISH   LEARNER PREFERENCE PRIMARY DEMONSTRATION   ANSWERED BY patient   RELATIONSHIP SELF       Depression Screening:  :     3 most recent PHQ Screens 2019   Little interest or pleasure in doing things Not at all   Feeling down, depressed, irritable, or hopeless Not at all   Total Score PHQ 2 0       Fall Risk Assessment:  :     No flowsheet data found. Abuse Screening:  :     Abuse Screening Questionnaire 2019   Do you ever feel afraid of your partner? N N   Are you in a relationship with someone who physically or mentally threatens you? N N   Is it safe for you to go home?  Y Y       Coordination of Care Questionnaire:  :     1) Have you been to an emergency room, urgent care clinic since your last visit? no   Hospitalized since your last visit? no             2) Have you seen or consulted any other health care providers outside of [address] [place] [address] since your last visit? no  (Include any pap smears or colon screenings in this section.)    3) Do you have an Advance Directive on file? no  Are you interested in receiving information about Advance Directives? no    Reviewed record in preparation for visit and have obtained necessary documentation. Medication reconciliation up to date and corrected with patient at this time.

## 2019-08-12 NOTE — PROGRESS NOTES
Subjective:      Tamika Chairez is a 37 y.o. female here with complaint of fluid on the ankles (left > right), left knee swelling, and dizziness. Onset was 2 weeks ago. Swelling is worse with sitting and have her legs straight tolu. Left knee is painful with prolonged driving and is located in the front of the knee. Pain is characterized as a dull sensation. Improved with use of ibuprofen. No known injury or trauma. Intermittent left knee buckling. Dizziness: worse with bending down, no syncopal episodes no vertigo symptoms. Occurring 3 times per week. Associated with nausea and vomiting. Denies visual disturbance. She has been eating and drinking well. Current Outpatient Medications   Medication Sig Dispense Refill    acetaminophen (TYLENOL) 500 mg tablet Take 1,000 mg by mouth every six (6) hours as needed for Pain.  hydroCHLOROthiazide (HYDRODIURIL) 12.5 mg tablet Take 1 Tab by mouth daily. 90 Tab 1    metFORMIN (GLUCOPHAGE) 500 mg tablet Take 1 Tab by mouth daily (with dinner). 90 Tab 1    aspirin delayed-release 81 mg tablet Take  by mouth daily.  fluticasone (FLONASE) 50 mcg/actuation nasal spray 1 spray each nostril BID  Indications: Allergic Rhinitis 1 Bottle 0    glucose blood VI test strips (FREESTYLE LITE STRIPS) strip Check fasting blood glucose daily. 100 Strip 2    Blood-Glucose Meter monitoring kit Check fasting blood glucose daily as directed. Please dispense Freestyle Lite meter 1 Kit 0    Lancets misc Test fasting blood glucose daily.  100 Each 2       Allergies   Allergen Reactions    Morphine Hives    Pcn [Penicillins] Hives     Tolerates amoxicillin though       Past Medical History:   Diagnosis Date    Anemia     Depression 5/20/2013    Diabetes ([facility] [address]     Essential hypertension     Hypertension     IBS (irritable bowel syndrome)        Social History     Tobacco Use    Smoking status: Never Smoker    Smokeless tobacco: Never Used   Substance Use Topics    Alcohol use: Yes     Comment: occas        Review of Systems  Pertinent items are noted in HPI. Objective:     Vitals:    08/12/19 1337 08/12/19 1341 08/12/19 1342   BP: 120/78 128/80  Comment: Manual 116/78   BP 1 Location: Right arm Right arm Right arm   BP Patient Position: Supine Sitting Standing   Pulse: (!) 102 (!) 104 (!) 106   Resp: 18     Temp: 98.5 °F (36.9 °C)  Comment: . TempSrc: Oral     SpO2: 98%     Weight: 230 lb (104.3 kg)     Height: 5' (1.524 m)          General appearance - alert, well appearing, and in no distress  Eyes - pupils equal and reactive, extraocular eye movements intact, sclera anicteric  Oropharyngx - mucous membranes moist, pharynx normal without lesions  Neck - supple, no significant adenopathy, carotids upstroke normal bilaterally, no bruits, thyroid exam: thyroid is normal in size without nodules or tenderness  Chest - clear to auscultation, no wheezes, rales or rhonchi, symmetric air entry, no tachypnea, retractions or cyanosis  Heart - normal rate, regular rhythm, normal S1, S2, no murmurs, rubs, clicks or gallops  Neurological - alert, oriented, normal speech, no focal findings or movement disorder noted  Extremities - bilateral pedal edema   Musculoskeletal - Knee exam: left positive for moderate crepitations, some mild tenderness and pain on range of motion, no effusion is present, no pseudo laxity noted    Assessment/Plan:   Carson Rudd is a 37 y.o. female seen for:     1. Postural dizziness: no orthostasis and examination benign at this time. - Discussed slowly moving from one position to the next      2. Pedal edema: no proteinuria. Check CMP. - AMB POC URINALYSIS DIP STICK AUTO W/O MICRO  - METABOLIC PANEL, COMPREHENSIVE  - watch salt intake, elevate feet, may wear compression stockings     3. Acute pain of left knee: check XR. Continue with ibuprofen PRN. - XR KNEE LT 3 V; Future    4. Essential hypertension: controlled, check labs.  Continue with current therapy. - AMB POC URINALYSIS DIP STICK AUTO W/O MICRO  - CBC WITH AUTOMATED DIFF  - METABOLIC PANEL, COMPREHENSIVE    5. Pre-diabetes: continue with metformin. Check A1c.   - HEMOGLOBIN A1C WITH EAG    I have discussed the diagnosis with the patient and the intended plan as seen in the above orders. The patient has received an after-visit summary and questions were answered concerning future plans. I have discussed medication side effects and warnings with the patient as well. Patient verbalizes understanding of plan of care and denies further questions or concerns at this time. Informed patient to return to the office if symptoms worsen or if new symptoms arise. Follow-up and Dispositions    · Return in about 6 months (around 2/12/2020), or if symptoms worsen or fail to improve.

## 2019-08-12 NOTE — PATIENT INSTRUCTIONS
Dizziness: Care Instructions  Your Care Instructions  Dizziness is the feeling of unsteadiness or fuzziness in your head. It is different than having vertigo, which is a feeling that the room is spinning or that you are moving or falling. It is also different from lightheadedness, which is the feeling that you are about to faint. It can be hard to know what causes dizziness. Some people feel dizzy when they have migraine headaches. Sometimes bouts of flu can make you feel dizzy. Some medical conditions, such as heart problems or high blood pressure, can make you feel dizzy. Many medicines can cause dizziness, including medicines for high blood pressure, pain, or anxiety. If a medicine causes your symptoms, your doctor may recommend that you stop or change the medicine. If it is a problem with your heart, you may need medicine to help your heart work better. If there is no clear reason for your symptoms, your doctor may suggest watching and waiting for a while to see if the dizziness goes away on its own. Follow-up care is a key part of your treatment and safety. Be sure to make and go to all appointments, and call your doctor if you are having problems. It's also a good idea to know your test results and keep a list of the medicines you take. How can you care for yourself at home? · If your doctor recommends or prescribes medicine, take it exactly as directed. Call your doctor if you think you are having a problem with your medicine. · Do not drive while you feel dizzy. · Try to prevent falls. Steps you can take include:  ? Using nonskid mats, adding grab bars near the tub, and using night-lights. ? Clearing your home so that walkways are free of anything you might trip on.  ? Letting family and friends know that you have been feeling dizzy. This will help them know how to help you. When should you call for help? Call 911 anytime you think you may need emergency care.  For example, call if:    · You passed out (lost consciousness).     · You have dizziness along with symptoms of a heart attack. These may include:  ? Chest pain or pressure, or a strange feeling in the chest.  ? Sweating. ? Shortness of breath. ? Nausea or vomiting. ? Pain, pressure, or a strange feeling in the back, neck, jaw, or upper belly or in one or both shoulders or arms. ? Lightheadedness or sudden weakness. ? A fast or irregular heartbeat.     · You have symptoms of a stroke. These may include:  ? Sudden numbness, tingling, weakness, or loss of movement in your face, arm, or leg, especially on only one side of your body. ? Sudden vision changes. ? Sudden trouble speaking. ? Sudden confusion or trouble understanding simple statements. ? Sudden problems with walking or balance. ? A sudden, severe headache that is different from past headaches.    Call your doctor now or seek immediate medical care if:    · You feel dizzy and have a fever, headache, or ringing in your ears.     · You have new or increased nausea and vomiting.     · Your dizziness does not go away or comes back.    Watch closely for changes in your health, and be sure to contact your doctor if:    · You do not get better as expected. Where can you learn more? Go to http://salvatore-elton.info/. Enter X397 in the search box to learn more about \"Dizziness: Care Instructions. \"  Current as of: September 23, 2018  Content Version: 12.1  © 1113-5860 [employer]. Care instructions adapted under license by [employer] (which disclaims liability or warranty for this information). If you have questions about a medical condition or this instruction, always ask your healthcare professional. [place] [address] any warranty or liability for your use of this information. Leg and Ankle Edema: Care Instructions  Your Care Instructions  Swelling in the legs, ankles, and feet is called edema.  It is common after you sit or stand for a while. Long plane flights or car rides often cause swelling in the legs and feet. You may also have swelling if you have to stand for long periods of time at your job. Problems with the veins in the legs (varicose veins) and changes in hormones can also cause swelling. Sometimes the swelling in the ankles and feet is caused by a more serious problem, such as heart failure, infection, blood clots, or liver or kidney disease. Follow-up care is a key part of your treatment and safety. Be sure to make and go to all appointments, and call your doctor if you are having problems. It's also a good idea to know your test results and keep a list of the medicines you take. How can you care for yourself at home? · If your doctor gave you medicine, take it as prescribed. Call your doctor if you think you are having a problem with your medicine. · Whenever you are resting, raise your legs up. Try to keep the swollen area higher than the level of your heart. · Take breaks from standing or sitting in one position. ? Walk around to increase the blood flow in your lower legs. ? Move your feet and ankles often while you stand, or tighten and relax your leg muscles. · Wear support stockings. Put them on in the morning, before swelling gets worse. · Eat a balanced diet. Lose weight if you need to. · Limit the amount of salt (sodium) in your diet. Salt holds fluid in the body and may increase swelling. When should you call for help? Call 911 anytime you think you may need emergency care. For example, call if:    · You have symptoms of a blood clot in your lung (called a pulmonary embolism). These may include:  ? Sudden chest pain. ? Trouble breathing. ? Coughing up blood.    Call your doctor now or seek immediate medical care if:    · You have signs of a blood clot, such as:  ? Pain in your calf, back of the knee, thigh, or groin. ?  Redness and swelling in your leg or groin.     · You have symptoms of infection, such as:  ? Increased pain, swelling, warmth, or redness. ? Red streaks or pus. ? A fever.    Watch closely for changes in your health, and be sure to contact your doctor if:    · Your swelling is getting worse.     · You have new or worsening pain in your legs.     · You do not get better as expected. Where can you learn more? Go to http://salvatore-elton.info/. Enter R550 in the search box to learn more about \"Leg and Ankle Edema: Care Instructions. \"  Current as of: September 23, 2018  Content Version: 12.1  © 5193-2857 [employer]. Care instructions adapted under license by [employer] (which disclaims liability or warranty for this information). If you have questions about a medical condition or this instruction, always ask your healthcare professional. [address] any warranty or liability for your use of this information.

## 2019-08-13 LAB
ALBUMIN SERPL-MCNC: 4.1 G/DL (ref 3.5–5.5)
ALBUMIN/GLOB SERPL: 1.5 {RATIO} (ref 1.2–2.2)
ALP SERPL-CCNC: 64 IU/L (ref 39–117)
ALT SERPL-CCNC: 8 IU/L (ref 0–32)
AST SERPL-CCNC: 10 IU/L (ref 0–40)
BASOPHILS # BLD AUTO: 0 X10E3/UL (ref 0–0.2)
BASOPHILS NFR BLD AUTO: 0 %
BILIRUB SERPL-MCNC: 0.2 MG/DL (ref 0–1.2)
BUN SERPL-MCNC: 11 MG/DL (ref 6–20)
BUN/CREAT SERPL: 14 (ref 9–23)
CALCIUM SERPL-MCNC: 9.3 MG/DL (ref 8.7–10.2)
CHLORIDE SERPL-SCNC: 105 MMOL/L (ref 96–106)
CO2 SERPL-SCNC: 21 MMOL/L (ref 20–29)
CREAT SERPL-MCNC: 0.81 MG/DL (ref 0.57–1)
EOSINOPHIL # BLD AUTO: 0.4 X10E3/UL (ref 0–0.4)
EOSINOPHIL NFR BLD AUTO: 5 %
ERYTHROCYTE [DISTWIDTH] IN BLOOD BY AUTOMATED COUNT: 14 % (ref 12.3–15.4)
EST. AVERAGE GLUCOSE BLD GHB EST-MCNC: 131 MG/DL
GLOBULIN SER CALC-MCNC: 2.8 G/DL (ref 1.5–4.5)
GLUCOSE SERPL-MCNC: 84 MG/DL (ref 65–99)
HBA1C MFR BLD: 6.2 % (ref 4.8–5.6)
HCT VFR BLD AUTO: 38.9 % (ref 34–46.6)
HGB BLD-MCNC: 12 G/DL (ref 11.1–15.9)
IMM GRANULOCYTES # BLD AUTO: 0 X10E3/UL (ref 0–0.1)
IMM GRANULOCYTES NFR BLD AUTO: 0 %
LYMPHOCYTES # BLD AUTO: 2.5 X10E3/UL (ref 0.7–3.1)
LYMPHOCYTES NFR BLD AUTO: 29 %
MCH RBC QN AUTO: 28.6 PG (ref 26.6–33)
MCHC RBC AUTO-ENTMCNC: 30.8 G/DL (ref 31.5–35.7)
MCV RBC AUTO: 93 FL (ref 79–97)
MONOCYTES # BLD AUTO: 0.6 X10E3/UL (ref 0.1–0.9)
MONOCYTES NFR BLD AUTO: 7 %
NEUTROPHILS # BLD AUTO: 5 X10E3/UL (ref 1.4–7)
NEUTROPHILS NFR BLD AUTO: 59 %
PLATELET # BLD AUTO: 340 X10E3/UL (ref 150–450)
POTASSIUM SERPL-SCNC: 4.4 MMOL/L (ref 3.5–5.2)
PROT SERPL-MCNC: 6.9 G/DL (ref 6–8.5)
RBC # BLD AUTO: 4.2 X10E6/UL (ref 3.77–5.28)
SODIUM SERPL-SCNC: 143 MMOL/L (ref 134–144)
WBC # BLD AUTO: 8.5 X10E3/UL (ref 3.4–10.8)

## 2019-08-14 ENCOUNTER — TELEPHONE (OUTPATIENT)
Dept: FAMILY MEDICINE CLINIC | Age: 34
End: 2019-08-14

## 2019-08-14 DIAGNOSIS — R60.0 PEDAL EDEMA: Primary | ICD-10-CM

## 2019-08-14 DIAGNOSIS — R42 POSTURAL DIZZINESS: ICD-10-CM

## 2019-08-14 NOTE — TELEPHONE ENCOUNTER
Patient called and labs discussed. States that she is now have temperature intolerance. Will check TFTs.

## 2019-08-17 LAB
T4 FREE SERPL-MCNC: 1.31 NG/DL (ref 0.82–1.77)
TSH SERPL DL<=0.005 MIU/L-ACNC: 1.2 UIU/ML (ref 0.45–4.5)

## 2019-08-19 ENCOUNTER — TELEPHONE (OUTPATIENT)
Dept: FAMILY MEDICINE CLINIC | Age: 34
End: 2019-08-19

## 2019-08-19 NOTE — TELEPHONE ENCOUNTER
Patient called requesting to obtain lab results from last Friday. Pt gets off work at 3:00 PM today.     Best contact: [phone]

## 2019-08-19 NOTE — TELEPHONE ENCOUNTER
----- Message from Moveline Bending sent at 8/19/2019  5:06 PM EDT -----  Regarding: Dr. Caitlyn Hicks   Pt is inquiring about results from last week. Best contact number is [phone].

## 2019-08-20 NOTE — TELEPHONE ENCOUNTER
Pt called back - spoke w/ nurse Keshawn and was given info that pt's thyroid appeared to be good. I gave this information to patient and advised her that we will contact for any further results that the labs entail.

## 2019-09-19 ENCOUNTER — TELEPHONE (OUTPATIENT)
Dept: FAMILY MEDICINE CLINIC | Age: 34
End: 2019-09-19

## 2019-09-19 NOTE — TELEPHONE ENCOUNTER
Pt dropped off a staff health report that she is needing back preferably before Monday. Will place in Dr Oumou mackay for . I advised pt Dr Juan M Amato won't be in until next week. Pt asked if another provider could possibly sign off on this form.

## 2019-09-20 NOTE — TELEPHONE ENCOUNTER
I just looked in Dr Michael Benavides box and did not see. Is this in the stack of papers Catie/Dr Francisco Newton are reviewing possibly?

## 2019-10-22 ENCOUNTER — OFFICE VISIT (OUTPATIENT)
Dept: FAMILY MEDICINE CLINIC | Age: 34
End: 2019-10-22

## 2019-10-22 VITALS
OXYGEN SATURATION: 97 % | TEMPERATURE: 98.5 F | SYSTOLIC BLOOD PRESSURE: 132 MMHG | DIASTOLIC BLOOD PRESSURE: 80 MMHG | HEART RATE: 98 BPM | HEIGHT: 60 IN | RESPIRATION RATE: 18 BRPM | BODY MASS INDEX: 44.76 KG/M2 | WEIGHT: 228 LBS

## 2019-10-22 DIAGNOSIS — J01.90 ACUTE RHINOSINUSITIS: ICD-10-CM

## 2019-10-22 DIAGNOSIS — R10.13 DYSPEPSIA: ICD-10-CM

## 2019-10-22 DIAGNOSIS — H65.02 NON-RECURRENT ACUTE SEROUS OTITIS MEDIA OF LEFT EAR: Primary | ICD-10-CM

## 2019-10-22 RX ORDER — AZITHROMYCIN 250 MG/1
TABLET, FILM COATED ORAL
Qty: 6 TAB | Refills: 0 | Status: SHIPPED | OUTPATIENT
Start: 2019-10-22 | End: 2019-10-27

## 2019-10-22 NOTE — PATIENT INSTRUCTIONS
Indigestion (Dyspepsia or Heartburn): Care Instructions  Your Care Instructions  Sometimes it can be hard to pinpoint the cause of indigestion. (It is also called dyspepsia or heartburn.) Most cases of an upset stomach with bloating, burning, burping, and nausea are minor and go away within several hours. Home treatment and over-the-counter medicine often are able to control symptoms. But if you take medicine to relieve your indigestion without making diet and lifestyle changes, your symptoms are likely to return again and again. If you get indigestion often, it may be a sign of a more serious medical problem. Be sure to follow up with your doctor, who may want to do tests to be sure of the cause of your indigestion. Follow-up care is a key part of your treatment and safety. Be sure to make and go to all appointments, and call your doctor if you are having problems. It's also a good idea to know your test results and keep a list of the medicines you take. How can you care for yourself at home? · Your doctor may recommend over-the-counter medicine. For mild or occasional indigestion, antacids such as Gaviscon, Mylanta, Maalox, or Tums, may help. Be safe with medicines. Be careful when you take over-the-counter antacid medicines. Many of these medicines have aspirin in them. Read the label to make sure that you are not taking more than the recommended dose. Too much aspirin can be harmful. · Your doctor also may recommend over-the-counter acid reducers, such as Pepcid AC, Tagamet HB, Zantac 75, or Prilosec. Read and follow all instructions on the label. If you use these medicines often, talk with your doctor. · Change your eating habits. ? It's best to eat several small meals instead of two or three large meals. ? After you eat, wait 2 to 3 hours before you lie down. ? Chocolate, mint, and alcohol can make GERD worse. ?  Spicy foods, foods that have a lot of acid (like tomatoes and oranges), and coffee can make GERD symptoms worse in some people. If your symptoms are worse after you eat a certain food, you may want to stop eating that food to see if your symptoms get better. · Do not smoke or chew tobacco. Smoking can make GERD worse. If you need help quitting, talk to your doctor about stop-smoking programs and medicines. These can increase your chances of quitting for good. · If you have GERD symptoms at night, raise the head of your bed 6 to 8 inches. You can do this by putting the frame on blocks or placing a foam wedge under the head of your mattress. (Adding extra pillows does not work.)  · Do not wear tight clothing around your middle. · Lose weight if you need to. Losing just 5 to 10 pounds can help. · Do not take anti-inflammatory medicines, such as aspirin, ibuprofen (Advil, Motrin), or naproxen (Aleve). These can irritate the stomach. If you need a pain medicine, try acetaminophen (Tylenol), which does not cause stomach upset. When should you call for help? Call your doctor now or seek immediate medical care if:    · You have new or worse belly pain.     · You are vomiting.    Watch closely for changes in your health, and be sure to contact your doctor if:    · You have new or worse symptoms of indigestion.     · You have trouble or pain swallowing.     · You are losing weight.     · You do not get better as expected. Where can you learn more? Go to http://salvatore-elton.info/. Enter X389 in the search box to learn more about \"Indigestion (Dyspepsia or Heartburn): Care Instructions. \"  Current as of: November 7, 2018  Content Version: 12.2  © 6696-4293 [employer]. Care instructions adapted under license by [employer] (which disclaims liability or warranty for this information).  If you have questions about a medical condition or this instruction, always ask your healthcare professional. [address] [facility] [address] any warranty or liability for your use of this information.

## 2019-10-22 NOTE — PROGRESS NOTES
Identified pt with two pt identifiers(name and ). Chief Complaint   Patient presents with    Abdominal Pain     Symptoms began about a week ago    Ear Pain     Left side     Sore Throat        There are no preventive care reminders to display for this patient. Wt Readings from Last 3 Encounters:   10/22/19 228 lb (103.4 kg)   19 230 lb (104.3 kg)   19 234 lb 5.6 oz (106.3 kg)     Temp Readings from Last 3 Encounters:   10/22/19 98.5 °F (36.9 °C) (Oral)   19 98.5 °F (36.9 °C) (Oral)   19 98.8 °F (37.1 °C)     BP Readings from Last 3 Encounters:   10/22/19 132/80   19 116/78   19 (!) 172/97     Pulse Readings from Last 3 Encounters:   10/22/19 98   19 (!) 106   19 93         Learning Assessment:  :     Learning Assessment 2016   PRIMARY LEARNER Patient   HIGHEST LEVEL OF EDUCATION - PRIMARY LEARNER  [employer]   BARRIERS PRIMARY LEARNER NONE   CO-LEARNER CAREGIVER No   PRIMARY LANGUAGE ENGLISH   LEARNER PREFERENCE PRIMARY DEMONSTRATION   ANSWERED BY patient   RELATIONSHIP SELF       Depression Screening:  :     3 most recent PHQ Screens 2019   Little interest or pleasure in doing things Not at all   Feeling down, depressed, irritable, or hopeless Not at all   Total Score PHQ 2 0       Fall Risk Assessment:  :     No flowsheet data found. Abuse Screening:  :     Abuse Screening Questionnaire 2019   Do you ever feel afraid of your partner? N N   Are you in a relationship with someone who physically or mentally threatens you? N N   Is it safe for you to go home?  Y Y       Coordination of Care Questionnaire:  :     1) Have you been to an emergency room, urgent care clinic since your last visit? no   Hospitalized since your last visit? no             2) Have you seen or consulted any other health care providers outside of [address] [place] [address] since your last visit? no  (Include any pap smears or colon screenings in this section.)    3) Do you have an Advance Directive on file? no  Are you interested in receiving information about Advance Directives? no    Reviewed record in preparation for visit and have obtained necessary documentation. Medication reconciliation up to date and corrected with patient at this time.

## 2019-10-22 NOTE — PROGRESS NOTES
Subjective:      Jhonathan Aviles is a 37 y.o. female here with complaint of intermittent abdominal pain and left ear pain. Abdominal pain: started 3 weeks ago. Pain is located in epigastrium and isnon-radiating. Pain is intermittent and burning in nature. Worse with certain foods. Associated with nausea. Denies melena, hematochezia. No LMP recorded. (Menstrual status: IUD). She has treated with Zantac with some improvement noted. Left ear pain started about 1.5 weeks ago. Associated with nasal congestion, sinus pressure. Sore throat began this morning and she has been exposed to strep throat. She reports chills today, no fevers. No treatment to date. Current Outpatient Medications   Medication Sig Dispense Refill    INTRAUTERINE DEVICE, IUD, IU by IntraUTERine route.  acetaminophen (TYLENOL) 500 mg tablet Take 1,000 mg by mouth every six (6) hours as needed for Pain.  hydroCHLOROthiazide (HYDRODIURIL) 12.5 mg tablet Take 1 Tab by mouth daily. 90 Tab 1    metFORMIN (GLUCOPHAGE) 500 mg tablet Take 1 Tab by mouth daily (with dinner). 90 Tab 1    aspirin delayed-release 81 mg tablet Take  by mouth daily.  glucose blood VI test strips (FREESTYLE LITE STRIPS) strip Check fasting blood glucose daily. 100 Strip 2    Blood-Glucose Meter monitoring kit Check fasting blood glucose daily as directed. Please dispense Freestyle Lite meter 1 Kit 0    Lancets misc Test fasting blood glucose daily.  100 Each 2       Allergies   Allergen Reactions    Morphine Hives    Pcn [Penicillins] Hives     Tolerates amoxicillin though       Past Medical History:   Diagnosis Date    Anemia     Depression 5/20/2013    Diabetes ([facility] [address]     Essential hypertension     Hypertension     IBS (irritable bowel syndrome)        Social History     Tobacco Use    Smoking status: Never Smoker    Smokeless tobacco: Never Used   Substance Use Topics    Alcohol use: Yes     Comment: occas        Review of Systems  Pertinent items are noted in HPI. Objective:     Visit Vitals  /80 (BP 1 Location: Right arm, BP Patient Position: Sitting) Comment: Manual   Pulse 98   Temp 98.5 °F (36.9 °C) (Oral) Comment: .   Resp 18   Ht 5' (1.524 m)   Wt 228 lb (103.4 kg)   SpO2 97%   BMI 44.53 kg/m²      General appearance - alert, well appearing, and in no distress  Eyes - pupils equal and reactive, extraocular eye movements intact, sclera anicteric  Ears - left serous fluid with air-fluid levels, right TM normal, external canals normal bilaterally   Nasal exam - mucosal congestion, mucosal erythema and sinus tenderness noted left right maxillary sinus   Oropharyngx - mucous membranes moist, pharynx normal without lesions  Neck - supple, no significant adenopathy  Chest - clear to auscultation, no wheezes, rales or rhonchi, symmetric air entry, no tachypnea, retractions or cyanosis  Heart - normal rate, regular rhythm, normal S1, S2, no murmurs, rubs, clicks or gallops  Abdomen - soft, mild epigastric tenderness without rebound or guarding, nondistended, no masses or organomegaly, bowel sounds normal    Assessment/Plan:   Jimena Smith is a 37 y.o. female seen for:     1. Non-recurrent acute serous otitis media of left ear  - azithromycin (ZITHROMAX) 250 mg tablet; Take 2 tablets today, then take 1 tablet daily  Dispense: 6 Tab; Refill: 0   - OTC analgesic of choice    2. Acute rhinosinusitis  - azithromycin (ZITHROMAX) 250 mg tablet; Take 2 tablets today, then take 1 tablet daily  Dispense: 6 Tab; Refill: 0  - nasal saline sprays as needed for congestion, warm compresses to sinuses     3. Dyspepsia: mild, no red flag symptoms with benign examination. May continue with H2 blocker therapy. Discussed use of PPI for worsening symptoms. I have discussed the diagnosis with the patient and the intended plan as seen in the above orders.  The patient has received an after-visit summary and questions were answered concerning future plans. I have discussed medication side effects and warnings with the patient as well. Patient verbalizes understanding of plan of care and denies further questions or concerns at this time. Informed patient to return to the office if symptoms worsen or if new symptoms arise. Follow-up and Dispositions    · Return if symptoms worsen or fail to improve.

## 2019-11-20 ENCOUNTER — TELEPHONE (OUTPATIENT)
Dept: SLEEP MEDICINE | Age: 34
End: 2019-11-20

## 2020-01-16 ENCOUNTER — OFFICE VISIT (OUTPATIENT)
Dept: FAMILY MEDICINE CLINIC | Age: 35
End: 2020-01-16

## 2020-01-16 VITALS
WEIGHT: 228 LBS | DIASTOLIC BLOOD PRESSURE: 80 MMHG | HEART RATE: 82 BPM | SYSTOLIC BLOOD PRESSURE: 124 MMHG | HEIGHT: 60 IN | OXYGEN SATURATION: 98 % | TEMPERATURE: 98.2 F | BODY MASS INDEX: 44.76 KG/M2 | RESPIRATION RATE: 18 BRPM

## 2020-01-16 DIAGNOSIS — R06.02 SHORTNESS OF BREATH: ICD-10-CM

## 2020-01-16 DIAGNOSIS — M25.512 ACUTE PAIN OF LEFT SHOULDER: Primary | ICD-10-CM

## 2020-01-16 RX ORDER — METHYLPREDNISOLONE 4 MG/1
TABLET ORAL
Qty: 1 DOSE PACK | Refills: 0 | Status: SHIPPED | OUTPATIENT
Start: 2020-01-16 | End: 2020-01-21

## 2020-01-16 RX ORDER — CYCLOBENZAPRINE HCL 10 MG
10 TABLET ORAL
Qty: 14 TAB | Refills: 0 | Status: SHIPPED | OUTPATIENT
Start: 2020-01-16 | End: 2020-05-28 | Stop reason: ALTCHOICE

## 2020-01-16 RX ORDER — DICLOFENAC SODIUM 75 MG/1
75 TABLET, DELAYED RELEASE ORAL 2 TIMES DAILY
Qty: 30 TAB | Refills: 0 | Status: SHIPPED | OUTPATIENT
Start: 2020-01-16 | End: 2020-05-28 | Stop reason: ALTCHOICE

## 2020-01-16 NOTE — LETTER
NOTIFICATION RETURN TO WORK / SCHOOL 
 
1/16/2020 9:49 AM 
 
Ms. Bernarda Frankel [address] [id number] To Whom It May Concern: 
 
Bernarda Frankel is currently under the care of [address] [place] [address]. She needs to be excused from work on 1/16, due to illness If there are questions or concerns please have the patient contact our office. Sincerely, Sommer Harmon NP

## 2020-01-16 NOTE — PROGRESS NOTES
HISTORY OF PRESENT ILLNESS  Karen Caldera is a 37 y.o. female. HPI   Pt presents with \"left shoulder, arm, and upper neck/back pain\"  Pt states that the symptoms started yesterday, out of the blue. She can not think of anything that caused the symptoms. She describes the pain as sharp and aching at times. Pain will radiate from left side of neck to left arm. She has no chest pain, but she does have some shortness of breath. Pain is not worse with movement. OTC: none  She is right handed and has never injured her left shoulder or arm in the past.  Review of Systems   Constitutional: Negative for fever. HENT: Negative for congestion. Gastrointestinal: Negative for diarrhea and vomiting. Musculoskeletal: Positive for joint pain. Physical Exam  Constitutional:       Appearance: Normal appearance. HENT:      Head: Normocephalic and atraumatic. Neck:      Musculoskeletal: Normal range of motion and neck supple. Cardiovascular:      Rate and Rhythm: Normal rate and regular rhythm. Heart sounds: Normal heart sounds. Pulmonary:      Effort: Pulmonary effort is normal.      Breath sounds: Normal breath sounds. Musculoskeletal:      Left shoulder: She exhibits pain. She exhibits normal range of motion and no tenderness. Neurological:      Mental Status: She is alert. Motor: Motor function is intact. Psychiatric:         Mood and Affect: Mood normal.         Behavior: Behavior normal.         ASSESSMENT and PLAN    ICD-10-CM ICD-9-CM    1. Acute pain of left shoulder M25.512 719.41 AMB POC EKG ROUTINE W/ 12 LEADS, INTER & REP      cyclobenzaprine (FLEXERIL) 10 mg tablet      methylPREDNISolone (MEDROL DOSEPACK) 4 mg tablet      diclofenac EC (VOLTAREN) 75 mg EC tablet   2.  Shortness of breath R06.02 786.05 AMB POC EKG ROUTINE W/ 12 LEADS, INTER & REP     Educated about taking medication as prescribed  Should return to office, or go to ER with continued and/or worsening of symptoms  Should always call 911 or go to the ER with ANY chest pain, shortness of breath, etc.    Pt informed to return to office with worsening of symptoms, or PRN with any questions or concerns. Pt verbalizes understanding of plan of care and denies further questions or concerns at this time.

## 2020-01-16 NOTE — PROGRESS NOTES
Identified pt with two pt identifiers(name and ). Chief Complaint   Patient presents with    Shoulder Pain     Symptoms began last night    Arm Pain        There are no preventive care reminders to display for this patient. Wt Readings from Last 3 Encounters:   20 228 lb (103.4 kg)   10/22/19 228 lb (103.4 kg)   19 230 lb (104.3 kg)     Temp Readings from Last 3 Encounters:   20 98.2 °F (36.8 °C) (Oral)   10/22/19 98.5 °F (36.9 °C) (Oral)   19 98.5 °F (36.9 °C) (Oral)     BP Readings from Last 3 Encounters:   20 124/80   10/22/19 132/80   19 116/78     Pulse Readings from Last 3 Encounters:   20 82   10/22/19 98   19 (!) 106         Learning Assessment:  :     Learning Assessment 2016   PRIMARY LEARNER Patient   HIGHEST LEVEL OF EDUCATION - PRIMARY LEARNER  [employer]   BARRIERS PRIMARY LEARNER NONE   CO-LEARNER CAREGIVER No   PRIMARY LANGUAGE ENGLISH   LEARNER PREFERENCE PRIMARY DEMONSTRATION   ANSWERED BY patient   RELATIONSHIP SELF       Depression Screening:  :     3 most recent PHQ Screens 2020   Little interest or pleasure in doing things Not at all   Feeling down, depressed, irritable, or hopeless Not at all   Total Score PHQ 2 0       Fall Risk Assessment:  :     No flowsheet data found. Abuse Screening:  :     Abuse Screening Questionnaire 2020   Do you ever feel afraid of your partner? N N N   Are you in a relationship with someone who physically or mentally threatens you? N N N   Is it safe for you to go home?  Y Y Y       Coordination of Care Questionnaire:  :     1) Have you been to an emergency room, urgent care clinic since your last visit? no   Hospitalized since your last visit? no             2) Have you seen or consulted any other health care providers outside of [address] [place] [address] since your last visit? no  (Include any pap smears or colon screenings in this section.)    3) Do you have an Advance Directive on file? no  Are you interested in receiving information about Advance Directives? no    Reviewed record in preparation for visit and have obtained necessary documentation. Medication reconciliation up to date and corrected with patient at this time.

## 2020-01-21 ENCOUNTER — OFFICE VISIT (OUTPATIENT)
Dept: FAMILY MEDICINE CLINIC | Age: 35
End: 2020-01-21

## 2020-01-21 VITALS
DIASTOLIC BLOOD PRESSURE: 80 MMHG | HEART RATE: 78 BPM | WEIGHT: 238 LBS | TEMPERATURE: 97.7 F | SYSTOLIC BLOOD PRESSURE: 128 MMHG | RESPIRATION RATE: 16 BRPM | HEIGHT: 60 IN | BODY MASS INDEX: 46.72 KG/M2 | OXYGEN SATURATION: 99 %

## 2020-01-21 DIAGNOSIS — M62.9 MUSCULOSKELETAL DISORDER INVOLVING UPPER TRAPEZIUS MUSCLE: Primary | ICD-10-CM

## 2020-01-21 DIAGNOSIS — M62.838 TRAPEZIUS MUSCLE SPASM: ICD-10-CM

## 2020-01-21 NOTE — PROGRESS NOTES
Subjective:      Dilan Verde is a 37 y.o. female here with complaint of pain in the left shoulder. Onset was about a week ago. Pain radiates across the shoulders and neck. Back is tender to the touch. Pain is constant, worse with putting on bra. Pain is described as aching. No recent injury or trauma noted. Bra size is 42 DDD and she is concerned that this may be due to her breast size. She has taken diclofenac and heating pad with limited improvement. Seen a few days ago and prescribed Medrol dose pack. Reports medication caused lightheadedness and did not make her feel well; has self discontinued. Flexeril - helpful at night     Current Outpatient Medications   Medication Sig Dispense Refill    cyclobenzaprine (FLEXERIL) 10 mg tablet Take 1 Tab by mouth nightly as needed for Muscle Spasm(s). 14 Tab 0    diclofenac EC (VOLTAREN) 75 mg EC tablet Take 1 Tab by mouth two (2) times a day. 30 Tab 0    INTRAUTERINE DEVICE, IUD, IU by IntraUTERine route.  acetaminophen (TYLENOL) 500 mg tablet Take 1,000 mg by mouth every six (6) hours as needed for Pain.  hydroCHLOROthiazide (HYDRODIURIL) 12.5 mg tablet Take 1 Tab by mouth daily. 90 Tab 1    metFORMIN (GLUCOPHAGE) 500 mg tablet Take 1 Tab by mouth daily (with dinner). 90 Tab 1    aspirin delayed-release 81 mg tablet Take  by mouth daily.  glucose blood VI test strips (FREESTYLE LITE STRIPS) strip Check fasting blood glucose daily. 100 Strip 2    Blood-Glucose Meter monitoring kit Check fasting blood glucose daily as directed. Please dispense Freestyle Lite meter 1 Kit 0    Lancets misc Test fasting blood glucose daily.  100 Each 2       Allergies   Allergen Reactions    Morphine Hives    Pcn [Penicillins] Hives     Tolerates amoxicillin though       Past Medical History:   Diagnosis Date    Anemia     Depression 5/20/2013    Diabetes ([facility] [address]     Essential hypertension     Hypertension     IBS (irritable bowel syndrome)        Social History     Tobacco Use    Smoking status: Never Smoker    Smokeless tobacco: Never Used   Substance Use Topics    Alcohol use: Yes     Comment: occas        Review of Systems  Pertinent items are noted in HPI. Objective:     Visit Vitals  /80 (BP 1 Location: Right arm, BP Patient Position: Sitting) Comment: Manual   Pulse 78   Temp 97.7 °F (36.5 °C) (Oral) Comment: .   Resp 16   Ht 5' (1.524 m)   Wt 238 lb (108 kg)   SpO2 99%   BMI 46.48 kg/m²      General appearance - alert, well appearing, and in no distress  Chest - clear to auscultation, no wheezes, rales or rhonchi, symmetric air entry, no tachypnea, retractions or cyanosis  Heart - normal rate, regular rhythm, normal S1, S2, no murmurs, rubs, clicks or gallops  Musculoskeletal -   MSK:    Posture: Normal   Deformity: None    ROM - Cervical spine:     Flexion: Normal     Extension: Normal     Lateral bending: Normal     Upper Ext: FROM bilaterally       Palpation:    C1 - T1 tenderness: None    Trapezious tenderness: Present on the left with palpable spasm      Strength (0-5/5):    :   Left: 5/5  Right: 5/5    Wrist Extension:  Left: 5/5  Right: 5/5    Wrist Flexion:  Left: 5/5  Right: 5/5    Elbow Flextion: Left: 5/5  Right: 5/5    Elbow Extension:  Left: 5/5  Right: 5/5    Shoulder Flexion:  Left: 5/5  Right: 5/5    Shoulder Abduction:  Left: 5/5  Right: 5/5       Sensation: Intact, no deficits in the upper ext bilaterally. Assessment/Plan:   Mary Ramires is a 37 y.o. female seen for:     1. Musculoskeletal disorder involving upper trapezius muscle: refer to PT, recommend to continue with NSAID therapy, muscle relaxer, and heat therapy at least 2-3 times per day. - REFERRAL TO PHYSICAL THERAPY    2. Trapezius muscle spasm  - REFERRAL TO PHYSICAL THERAPY    I have discussed the diagnosis with the patient and the intended plan as seen in the above orders.  The patient has received an after-visit summary and questions were answered concerning future plans. I have discussed medication side effects and warnings with the patient as well. Patient verbalizes understanding of plan of care and denies further questions or concerns at this time. Informed patient to return to the office if symptoms worsen or if new symptoms arise.

## 2020-01-21 NOTE — PROGRESS NOTES
Identified pt with two pt identifiers(name and ). No chief complaint on file. There are no preventive care reminders to display for this patient. Wt Readings from Last 3 Encounters:   20 238 lb (108 kg)   20 228 lb (103.4 kg)   10/22/19 228 lb (103.4 kg)     Temp Readings from Last 3 Encounters:   20 97.7 °F (36.5 °C) (Oral)   20 98.2 °F (36.8 °C) (Oral)   10/22/19 98.5 °F (36.9 °C) (Oral)     BP Readings from Last 3 Encounters:   20 128/80   20 124/80   10/22/19 132/80     Pulse Readings from Last 3 Encounters:   20 78   20 82   10/22/19 98         Learning Assessment:  :     Learning Assessment 2016   PRIMARY LEARNER Patient   HIGHEST LEVEL OF EDUCATION - PRIMARY LEARNER  TR[employer]   BARRIERS PRIMARY LEARNER NONE   CO-LEARNER CAREGIVER No   PRIMARY LANGUAGE ENGLISH   LEARNER PREFERENCE PRIMARY DEMONSTRATION   ANSWERED BY patient   RELATIONSHIP SELF       Depression Screening:  :     3 most recent PHQ Screens 2020   Little interest or pleasure in doing things Not at all   Feeling down, depressed, irritable, or hopeless Not at all   Total Score PHQ 2 0       Fall Risk Assessment:  :     No flowsheet data found. Abuse Screening:  :     Abuse Screening Questionnaire 2020   Do you ever feel afraid of your partner? N N N   Are you in a relationship with someone who physically or mentally threatens you? N N N   Is it safe for you to go home? Y Y Y       Coordination of Care Questionnaire:  :     1) Have you been to an emergency room, urgent care clinic since your last visit? no   Hospitalized since your last visit? no             2) Have you seen or consulted any other health care providers outside of [address] [place] [address] since your last visit? no  (Include any pap smears or colon screenings in this section.)    3) Do you have an Advance Directive on file?  no  Are you interested in receiving information about Advance Directives? no    Reviewed record in preparation for visit and have obtained necessary documentation. Medication reconciliation up to date and corrected with patient at this time.

## 2020-03-05 ENCOUNTER — OFFICE VISIT (OUTPATIENT)
Dept: FAMILY MEDICINE CLINIC | Age: 35
End: 2020-03-05

## 2020-03-05 VITALS
DIASTOLIC BLOOD PRESSURE: 70 MMHG | HEIGHT: 60 IN | TEMPERATURE: 98.1 F | BODY MASS INDEX: 45.94 KG/M2 | WEIGHT: 234 LBS | HEART RATE: 90 BPM | SYSTOLIC BLOOD PRESSURE: 120 MMHG | RESPIRATION RATE: 18 BRPM | OXYGEN SATURATION: 98 %

## 2020-03-05 DIAGNOSIS — K62.5 BRIGHT RED BLOOD PER RECTUM: Primary | ICD-10-CM

## 2020-03-05 LAB
HEMOCCULT STL QL: NEGATIVE
VALID INTERNAL CONTROL?: YES

## 2020-03-05 NOTE — PROGRESS NOTES
Subjective:      Waylon Fothergill is a 38 y.o. female here with complaint of blood in stool over the last 2 weeks. Blood is mixed into the stool and noticing blood on the toilet paper. Blood is bright red in color. Denies straining or pain with defecation, hemorrhoids. Has been having bowel movement daily. No new foods, drinks, or medications. Family history notable for colon cancer in her maternal uncle. Intermittent abdominal discomfort, nausea without vomiting, melena. Did feel lightheaded today. Current Outpatient Medications   Medication Sig Dispense Refill    cyclobenzaprine (FLEXERIL) 10 mg tablet Take 1 Tab by mouth nightly as needed for Muscle Spasm(s). 14 Tab 0    diclofenac EC (VOLTAREN) 75 mg EC tablet Take 1 Tab by mouth two (2) times a day. 30 Tab 0    INTRAUTERINE DEVICE, IUD, IU by IntraUTERine route.  acetaminophen (TYLENOL) 500 mg tablet Take 1,000 mg by mouth every six (6) hours as needed for Pain.  hydroCHLOROthiazide (HYDRODIURIL) 12.5 mg tablet Take 1 Tab by mouth daily. 90 Tab 1    metFORMIN (GLUCOPHAGE) 500 mg tablet Take 1 Tab by mouth daily (with dinner). 90 Tab 1    aspirin delayed-release 81 mg tablet Take  by mouth daily.  glucose blood VI test strips (FREESTYLE LITE STRIPS) strip Check fasting blood glucose daily. 100 Strip 2    Blood-Glucose Meter monitoring kit Check fasting blood glucose daily as directed. Please dispense Freestyle Lite meter 1 Kit 0    Lancets misc Test fasting blood glucose daily. 100 Each 2       Allergies   Allergen Reactions    Morphine Hives    Pcn [Penicillins] Hives     Tolerates amoxicillin though       Past Medical History:   Diagnosis Date    Anemia     Depression 5/20/2013    Diabetes ([facility] [address]     Essential hypertension     Hypertension     IBS (irritable bowel syndrome)        Social History     Tobacco Use    Smoking status: Never Smoker    Smokeless tobacco: Never Used   Substance Use Topics    Alcohol use:  Yes Comment: occas        Review of Systems  Pertinent items are noted in HPI. Objective:     Visit Vitals  /70 (BP 1 Location: Right arm, BP Patient Position: Sitting) Comment: [place]   Pulse 90   Temp 98.1 °F (36.7 °C) (Oral) Comment: .   Resp 18   Ht 5' (1.524 m)   Wt 234 lb (106.1 kg)   SpO2 98%   BMI 45.70 kg/m²      General appearance - alert, well appearing, and in no distress  Chest - clear to auscultation, no wheezes, rales or rhonchi, symmetric air entry, no tachypnea, retractions or cyanosis  Heart - normal rate, regular rhythm, normal S1, S2, no murmurs, rubs, clicks or gallops  Abdomen - soft, nontender, nondistended  Rectal - Rectal exam: negative without mass, lesions or tenderness, stool guaiac negative. Recent Results (from the past 12 hour(s))   AMB POC FECAL BLOOD, OCCULT, QL 1 CARD    Collection Time: 03/05/20  3:15 PM   Result Value Ref Range    VALID INTERNAL CONTROL POC Yes     Hemoccult (POC) Negative Negative         Assessment/Plan:   Elyse Salas is a 38 y.o. female seen for:     1. Bright red blood per rectum: negative FOBT in office. Recommend GI evaluation.   - REFERRAL TO GASTROENTEROLOGY  - AMB POC FECAL BLOOD, OCCULT, QL 1 CARD    I have discussed the diagnosis with the patient and the intended plan as seen in the above orders. The patient has received an after-visit summary and questions were answered concerning future plans. I have discussed medication side effects and warnings with the patient as well. Patient verbalizes understanding of plan of care and denies further questions or concerns at this time. Informed patient to return to the office if symptoms worsen or if new symptoms arise.

## 2020-03-05 NOTE — PATIENT INSTRUCTIONS
Rectal Bleeding: Care Instructions Your Care Instructions Rectal bleeding in small amounts is common. You may see red spotting on toilet paper or drops of blood in the toilet. Rectal bleeding has many possible causes, from something as minor as hemorrhoids to something as serious as colon cancer. You may need more tests to find the cause of your bleeding. Follow-up care is a key part of your treatment and safety. Be sure to make and go to all appointments, and call your doctor if you are having problems. It's also a good idea to know your test results and keep a list of the medicines you take. How can you care for yourself at home? · Avoid aspirin and other nonsteroidal anti-inflammatory drugs (NSAIDs), such as ibuprofen (Advil, Motrin) and naproxen (Aleve). They can cause you to bleed more. Ask your doctor if you can take acetaminophen (Tylenol). Read and follow all instructions on the label. · Use a stool softener that contains bran or psyllium. You can save money by buying bran or psyllium (available in bulk at most health food stores) and sprinkling it on foods or stirring it into fruit juice. You can also use a product such as Metamucil or Citrucel. · Take your medicines exactly as directed. Call your doctor if you think you are having a problem with your medicine. When should you call for help? Call 911 anytime you think you may need emergency care. For example, call if: 
  · You passed out (lost consciousness).  
 Call your doctor now or seek immediate medical care if: 
  · You have new or worse pain.  
  · You have new or worse bleeding from the rectum.  
  · You are dizzy or light-headed, or you feel like you may faint.  
 Watch closely for changes in your health, and be sure to contact your doctor if: 
  · You cannot pass stools or gas.  
  · You do not get better as expected. Where can you learn more? Go to http://salvatore-elton.info/. Enter X175 in the search box to learn more about \"Rectal Bleeding: Care Instructions. \" Current as of: November 7, 2018 Content Version: 12.2 © 5935-6210 [employer]. Care instructions adapted under license by [employer] (which disclaims liability or warranty for this information). If you have questions about a medical condition or this instruction, always ask your healthcare professional. [place] [address] any warranty or liability for your use of this information.

## 2020-03-05 NOTE — PROGRESS NOTES
Identified pt with two pt identifiers(name and ). Chief Complaint   Patient presents with    Rectal Bleeding     Off and on for 2 weeks. There are no preventive care reminders to display for this patient. Wt Readings from Last 3 Encounters:   20 234 lb (106.1 kg)   20 238 lb (108 kg)   20 228 lb (103.4 kg)     Temp Readings from Last 3 Encounters:   20 98.1 °F (36.7 °C) (Oral)   20 97.7 °F (36.5 °C) (Oral)   20 98.2 °F (36.8 °C) (Oral)     BP Readings from Last 3 Encounters:   20 120/70   20 128/80   20 124/80     Pulse Readings from Last 3 Encounters:   20 90   20 78   20 82         Learning Assessment:  :     Learning Assessment 2016   PRIMARY LEARNER Patient   HIGHEST LEVEL OF EDUCATION - PRIMARY LEARNER  [employer]   BARRIERS PRIMARY LEARNER NONE   CO-LEARNER CAREGIVER No   PRIMARY LANGUAGE ENGLISH   LEARNER PREFERENCE PRIMARY DEMONSTRATION   ANSWERED BY patient   RELATIONSHIP SELF       Depression Screening:  :     3 most recent PHQ Screens 2020   Little interest or pleasure in doing things Not at all   Feeling down, depressed, irritable, or hopeless Not at all   Total Score PHQ 2 0       Fall Risk Assessment:  :     No flowsheet data found. Abuse Screening:  :     Abuse Screening Questionnaire 2020   Do you ever feel afraid of your partner? N N N   Are you in a relationship with someone who physically or mentally threatens you? N N N   Is it safe for you to go home?  Y Y Y       Coordination of Care Questionnaire:  :     1) Have you been to an emergency room, urgent care clinic since your last visit? no   Hospitalized since your last visit? no             2) Have you seen or consulted any other health care providers outside of [address] [place] [address] since your last visit? no  (Include any pap smears or colon screenings in this section.)    3) Do you have an Advance Directive on file? no  Are you interested in receiving information about Advance Directives? no    Reviewed record in preparation for visit and have obtained necessary documentation. Medication reconciliation up to date and corrected with patient at this time.

## 2020-03-25 DIAGNOSIS — I10 ESSENTIAL HYPERTENSION: ICD-10-CM

## 2020-03-25 RX ORDER — HYDROCHLOROTHIAZIDE 12.5 MG/1
TABLET ORAL
Qty: 90 TAB | Refills: 1 | Status: SHIPPED | OUTPATIENT
Start: 2020-03-25 | End: 2021-04-06 | Stop reason: SDUPTHER

## 2020-04-01 ENCOUNTER — VIRTUAL VISIT (OUTPATIENT)
Dept: FAMILY MEDICINE CLINIC | Age: 35
End: 2020-04-01

## 2020-04-01 DIAGNOSIS — J01.00 ACUTE NON-RECURRENT MAXILLARY SINUSITIS: Primary | ICD-10-CM

## 2020-04-01 RX ORDER — DOXYCYCLINE 100 MG/1
100 TABLET ORAL 2 TIMES DAILY
Qty: 20 TAB | Refills: 0 | Status: SHIPPED | OUTPATIENT
Start: 2020-04-01 | End: 2020-04-11

## 2020-04-01 NOTE — PROGRESS NOTES
HISTORY OF PRESENT ILLNESS  Natalie Poole is a 38 y.o. female. HPI   Pt presents with \"sinus infection\"  Visit was completed viat [email or web address]. me  Pt was located at home, provider was located at office [facility]. Visit lasted 4 minutes    Pt states that she believes that she has a sinus infection  Sinus pain and pressure  Headache  She has allergies, and believes that this may have kicked off infection  No fever  OTC: none  Review of Systems   Constitutional: Negative for fever. HENT: Positive for congestion and sinus pain. Neurological: Positive for headaches. Physical Exam  Constitutional:       Appearance: Normal appearance. Neurological:      General: No focal deficit present. Mental Status: She is alert and oriented to person, place, and time. ASSESSMENT and PLAN    ICD-10-CM ICD-9-CM    1. Acute non-recurrent maxillary sinusitis J01.00 461.0 doxycycline (ADOXA) 100 mg tablet     Educated about taking medication as prescribed, with food  Should stay well hydrated, and treat fever as needed    Pt informed to return to office with worsening of symptoms, or PRN with any questions or concerns. Pt verbalizes understanding of plan of care and denies further questions or concerns at this time.

## 2020-04-04 ENCOUNTER — TELEPHONE (OUTPATIENT)
Dept: FAMILY MEDICINE CLINIC | Age: 35
End: 2020-04-04

## 2020-04-04 DIAGNOSIS — R05.9 COUGH: Primary | ICD-10-CM

## 2020-04-04 RX ORDER — BENZONATATE 100 MG/1
100 CAPSULE ORAL
Qty: 21 CAP | Refills: 0 | Status: SHIPPED | OUTPATIENT
Start: 2020-04-04 | End: 2020-04-11

## 2020-04-04 NOTE — TELEPHONE ENCOUNTER
On Call Note:     Patient with productive cough which has been painful and interfering with sleep for the past few days. Currently on doxycycline for sinus infection. Denies fever, dyspnea, wheezing. Has been also taking Zyrtec. Recommend OTC cough medication such as Robitussin or Mucinex. Will send Rx for [employer]. Patient verbalizes understanding. Orders Placed This Encounter    benzonatate (TESSALON) 100 mg capsule     Sig: Take 1 Cap by mouth three (3) times daily as needed for Cough for up to 7 days.      Dispense:  21 Cap     Refill:  0       Daniel Dobbs MD

## 2020-05-28 ENCOUNTER — VIRTUAL VISIT (OUTPATIENT)
Dept: FAMILY MEDICINE CLINIC | Age: 35
End: 2020-05-28

## 2020-05-28 DIAGNOSIS — M25.469 SWELLING OF KNEE: ICD-10-CM

## 2020-05-28 DIAGNOSIS — R06.2 WHEEZING: ICD-10-CM

## 2020-05-28 DIAGNOSIS — F41.9 ANXIETY: Primary | ICD-10-CM

## 2020-05-28 RX ORDER — BUSPIRONE HYDROCHLORIDE 10 MG/1
10 TABLET ORAL 2 TIMES DAILY
Qty: 60 TAB | Refills: 2 | Status: SHIPPED | OUTPATIENT
Start: 2020-05-28 | End: 2020-09-02 | Stop reason: SINTOL

## 2020-05-28 RX ORDER — ALBUTEROL SULFATE 90 UG/1
1 AEROSOL, METERED RESPIRATORY (INHALATION)
Qty: 1 INHALER | Refills: 1 | Status: SHIPPED | OUTPATIENT
Start: 2020-05-28 | End: 2021-04-14 | Stop reason: SDUPTHER

## 2020-05-28 NOTE — PROGRESS NOTES
Nayan De Guzman is a 38 y.o. female evaluated via audio only technology on 5/28/2020. Consent: She and/or her health care decision maker is aware that she may receive a bill for this audio only encounter, depending on her insurance coverage, and has provided verbal consent to proceed: Yes    I communicated with the patient and/or health care decision maker about the nature and details of the following:  Assessment & Plan:   Diagnoses and all orders for this visit:    1. Anxiety: start buspirone and titrate dose based upon tolerability and effectiveness. Side effects discussed. Follow up in 4 weeks. -     busPIRone (BUSPAR) 10 mg tablet; Take 1 Tab by mouth two (2) times a day. 2. Wheezing  -     albuterol (PROVENTIL HFA, VENTOLIN HFA, PROAIR HFA) 90 mcg/actuation inhaler; Take 1 Puff by inhalation every four (4) hours as needed for Wheezing. 3. Swelling of knee: intermittent, discussed elevation, use of brace. Discussed orthopedic evaluation as well; will monitor at this time. 12  Subjective:   Nayan De Guzman is a 38 y.o. female who was seen for Anxiety; Wheezing; and Knee Swelling (\"fluid on knee\")    About 3-4 years ago started to have anxiety attacks. Reports feeling overwhelmed, chest discomfort, head pain for the last month. Episodes have been intermittent and last for 3-4 minutes before self-resolving. Occurring about twice daily. Triggers: current world events, trying to balance home and work. Would like to discuss working 4 days per week at this time. Wheezing with exertion and with coughing. Associated with postnasal drainage which she attributes to her allergies. She is taking Zyrtec 10 mg for the past 3 weeks. No known h/o asthma. Lasting for 4-5 minutes before self resolving. Denies associated fever, chills, orthopnea. Did do an albuterol treatment with some benefit. Right knee and right hand will swell with prolonged sitting. Depends on how much fluid she intakes.  Associated with discomfort especially in the knee. Swelling in the mid knee. No known injury or trauma. No overlying erythema. No recent increase in activity. Does elevate with some improvement. Will sometimes have grinding in the knee. No over consumption of salt. Prior to Admission medications    Medication Sig Start Date End Date Taking? Authorizing Provider   hydroCHLOROthiazide (HYDRODIURIL) 12.5 mg tablet TAKE 1 TABLET BY MOUTH EVERY DAY 3/25/20  Yes Rico Jerome MD   INTRAUTERINE DEVICE, IUD, IU by IntraUTERine route. Yes Provider, Historical   metFORMIN (GLUCOPHAGE) 500 mg tablet Take 1 Tab by mouth daily (with dinner). 2/14/19  Yes Rico Jerome MD   aspirin delayed-release 81 mg tablet Take  by mouth daily. Yes Provider, Historical   glucose blood VI test strips (FREESTYLE LITE STRIPS) strip Check fasting blood glucose daily. 12/27/16  Yes Rico Jerome MD   Blood-Glucose Meter monitoring kit Check fasting blood glucose daily as directed. Please dispense Freestyle Lite meter 12/27/16  Yes Rico Jerome MD   Lancets misc Test fasting blood glucose daily. 12/27/16  Yes Rico Jerome MD   cyclobenzaprine (FLEXERIL) 10 mg tablet Take 1 Tab by mouth nightly as needed for Muscle Spasm(s). 1/16/20 5/28/20  Josh Tai NP   diclofenac EC (VOLTAREN) 75 mg EC tablet Take 1 Tab by mouth two (2) times a day. 1/16/20 5/28/20  Josh Tai NP   acetaminophen (TYLENOL) 500 mg tablet Take 1,000 mg by mouth every six (6) hours as needed for Pain.   5/28/20  Other, MD Pablo       Allergies   Allergen Reactions    Morphine Hives    Pcn [Penicillins] Hives     Tolerates amoxicillin though       Past Medical History:   Diagnosis Date    Anemia     Depression 5/20/2013    Diabetes ([address]     Essential hypertension     Hypertension     IBS (irritable bowel syndrome)      Social History     Tobacco Use    Smoking status: Never Smoker    Smokeless tobacco: Never Used   Substance Use Topics    Alcohol use: Yes     Comment: occas       ROS  Pertinent items noted in HPI      I affirm this is a Patient-Initiated Episode with a Patient who has not had a related appointment within my department in the past 7 days or scheduled within the next 24 hours.     Total Time: minutes: 17    Note: not billable if this call serves to triage the patient into an appointment for the relevant concern      Manda Rondon MD

## 2020-05-28 NOTE — LETTER
5/28/2020 Ms. Jan Martins [address] [address] To Whom It May Concern, Jan Martins is currently under my care at [facility]. Ms. Elizabeth Jacobo had a virtual appointment on 5/28/2020. Due her current diagnosis and medication regimen, I have recommended that she work four days per week for the next four weeks (to end 6/28/2020). Ms. Elizabeth Jacobo has been advised to schedule a follow up appointment in four weeks. If there are any additional questions or concerns, please have Ms. Elizabeth Jacobo contact my office, [phone]. Sincerely, Jordan Franco MD

## 2020-05-28 NOTE — PROGRESS NOTES
Attempted to call pt to do nursing assessment for today's office visit, however, phone number given rings directly to recording stating the person I am trying to reach has a voicemail box that has not been set up yet.

## 2020-06-15 ENCOUNTER — VIRTUAL VISIT (OUTPATIENT)
Dept: FAMILY MEDICINE CLINIC | Age: 35
End: 2020-06-15

## 2020-06-15 DIAGNOSIS — F41.9 ANXIETY: Primary | ICD-10-CM

## 2020-06-15 DIAGNOSIS — R11.0 NAUSEA IN ADULT: ICD-10-CM

## 2020-06-15 RX ORDER — ONDANSETRON 4 MG/1
4 TABLET, ORALLY DISINTEGRATING ORAL
Qty: 20 TAB | Refills: 1 | Status: SHIPPED | OUTPATIENT
Start: 2020-06-15

## 2020-06-15 NOTE — PROGRESS NOTES
Jt Solis is a 38 y.o. female who was seen by synchronous (real-time) audio-video technology on 6/15/2020. Consent: Jt Solis, who was seen by synchronous (real-time) audio-video technology, and/or her healthcare decision maker, is aware that this patient-initiated, Telehealth encounter on 6/15/2020 is a billable service, with coverage as determined by her insurance carrier. She is aware that she may receive a bill and has provided verbal consent to proceed: Yes. Assessment & Plan:   Diagnoses and all orders for this visit:    1. Anxiety: currently on Buspar and discussed that GI symptoms likely secondary to its use. Discussed discontinuing medication vs observing and seeing if symptoms resolve. As Buspar has been effective, she would like to continue with therapy at this time. Will monitor and see if symptoms resolve. Discussed change in therapy if symptoms persist.     2. Nausea in adult:   -     ondansetron (ZOFRAN ODT) 4 mg disintegrating tablet; Take 1 Tab by mouth every eight (8) hours as needed for Nausea or Vomiting. 712  Subjective:   Jt Solis is a 38 y.o. female who was seen for Anxiety (follow up to start of medication); Diabetes; Medication Evaluation (has been taking metformin for 2-3 years but lately feels like it is causing upset stomach, diarrhea, nausea - does reports starting buspirone within the past month); and Nausea (\"I am feeling nauseous all the time\" - Nurse questioned LMP and pt reports she has IUD that is overdue to be changed and does not have menses - is SA)    Abdominal cramping, nausea, diarrhea - onset a few weeks ago. Concerned that symptoms secondary to metformin which she discontinued last Thursday. Symptoms have continued. Denies fever, chills, vomiting, melena, hematochezia. No recent travel. No close contacts with similar symptoms. Recently started Buspar which has been helpful for her anxiety.      Prior to Admission medications    Medication Sig Start Date End Date Taking? Authorizing Provider   busPIRone (BUSPAR) 10 mg tablet Take 1 Tab by mouth two (2) times a day. 5/28/20  Yes Alma Santana MD   albuterol (PROVENTIL HFA, VENTOLIN HFA, PROAIR HFA) 90 mcg/actuation inhaler Take 1 Puff by inhalation every four (4) hours as needed for Wheezing. 5/28/20  Yes Alma Santana MD   hydroCHLOROthiazide (HYDRODIURIL) 12.5 mg tablet TAKE 1 TABLET BY MOUTH EVERY DAY 3/25/20  Yes Alma Santana MD   INTRAUTERINE DEVICE, IUD, IU by IntraUTERine route. Yes Provider, Historical   metFORMIN (GLUCOPHAGE) 500 mg tablet Take 1 Tab by mouth daily (with dinner). 2/14/19  Yes Alma Santana MD   aspirin delayed-release 81 mg tablet Take  by mouth daily. Yes Provider, Historical   glucose blood VI test strips (FREESTYLE LITE STRIPS) strip Check fasting blood glucose daily. 12/27/16  Yes Alma Santana MD   Blood-Glucose Meter monitoring kit Check fasting blood glucose daily as directed. Please dispense Freestyle Lite meter 12/27/16  Yes Alma Santana MD   Lancets misc Test fasting blood glucose daily. 12/27/16  Yes Alma Santana MD     Allergies   Allergen Reactions    Morphine Hives    Pcn [Penicillins] Hives     Tolerates amoxicillin though       Past Medical History:   Diagnosis Date    Anemia     Depression 5/20/2013    Diabetes ([facility] [address]     Essential hypertension     Hypertension     IBS (irritable bowel syndrome)      Social History     Tobacco Use    Smoking status: Never Smoker    Smokeless tobacco: Never Used   Substance Use Topics    Alcohol use: Yes     Comment: occas       ROS  Pertinent items noted in HPI    Objective: There were no vitals taken for this visit.    General: alert, cooperative, no distress   Mental  status: normal mood, behavior, speech, dress, motor activity, and thought processes, able to follow commands   HENT: NCAT   Neck: no visualized mass   Resp: no respiratory distress   Neuro: no gross deficits Skin: no discoloration or lesions of concern on visible areas   Psychiatric: normal affect, consistent with stated mood, no evidence of hallucinations     We discussed the expected course, resolution and complications of the diagnosis(es) in detail. Medication risks, benefits, costs, interactions, and alternatives were discussed as indicated. I advised her to contact the office if her condition worsens, changes or fails to improve as anticipated. She expressed understanding with the diagnosis(es) and plan. Tommy Pozo is a 38 y.o. female who was evaluated by a video visit encounter for concerns as above. Patient identification was verified prior to start of the visit. A caregiver was present when appropriate. Due to this being a TeleHealth encounter (During [facility]- public health emergency), evaluation of the following organ systems was limited: Vitals/Constitutional/EENT/Resp/CV/GI//MS/Neuro/Skin/Heme-Lymph-Imm. Pursuant to the emergency declaration under the [facility], 1135 waiver authority and the [employer] Act, this Virtual  Visit was conducted, with patient's (and/or legal guardian's) consent, to reduce the patient's risk of exposure to COVID-19 and provide necessary medical care. Services were provided through a video synchronous discussion virtually to substitute for in-person clinic visit. Patient and provider were located at their individual homes.       Manda Rondon MD

## 2020-06-15 NOTE — PROGRESS NOTES
Identified pt with two pt identifiers(name and ). Chief Complaint   Patient presents with    Anxiety     follow up to start of medication    Diabetes    Medication Evaluation     has been taking metformin for 2-3 years but lately feels like it is causing upset stomach, diarrhea, nausea - does reports starting buspirone within the past month    Nausea     \"I am feeling nauseous all the time\" - Nurse questioned LMP and pt reports she has IUD that is overdue to be changed and does not have menses - is SA        Health Maintenance Due   Topic    PAP AKA CERVICAL CYTOLOGY        Wt Readings from Last 3 Encounters:   20 234 lb (106.1 kg)   20 238 lb (108 kg)   20 228 lb (103.4 kg)     Temp Readings from Last 3 Encounters:   20 98.1 °F (36.7 °C) (Oral)   20 97.7 °F (36.5 °C) (Oral)   20 98.2 °F (36.8 °C) (Oral)     BP Readings from Last 3 Encounters:   20 120/70   20 128/80   20 124/80     Pulse Readings from Last 3 Encounters:   20 90   20 78   20 82         Learning Assessment:  :     Learning Assessment 2016   PRIMARY LEARNER Patient   HIGHEST LEVEL OF EDUCATION - PRIMARY LEARNER  [employer]   BARRIERS PRIMARY LEARNER NONE   CO-LEARNER CAREGIVER No   PRIMARY LANGUAGE ENGLISH   LEARNER PREFERENCE PRIMARY DEMONSTRATION   ANSWERED BY patient   RELATIONSHIP SELF       Depression Screening:  :     3 most recent PHQ Screens 2020   Little interest or pleasure in doing things Not at all   Feeling down, depressed, irritable, or hopeless Not at all   Total Score PHQ 2 0       Fall Risk Assessment:  :     No flowsheet data found. Abuse Screening:  :     Abuse Screening Questionnaire 2020   Do you ever feel afraid of your partner? N N N   Are you in a relationship with someone who physically or mentally threatens you? N N N   Is it safe for you to go home?  Paul Schreiber       Coordination of Care Questionnaire:  : 1) Have you been to an emergency room, urgent care clinic since your last visit? no   Hospitalized since your last visit? no             2) Have you seen or consulted any other health care providers outside of [address] [place] [address] since your last visit? no  (Include any pap smears or colon screenings in this section.)    3) Do you have an Advance Directive on file? no  Are you interested in receiving information about Advance Directives? no    Reviewed record in preparation for visit and have obtained necessary documentation. Medication reconciliation up to date and corrected with patient at this time.

## 2020-09-01 DIAGNOSIS — R73.03 PRE-DIABETES: ICD-10-CM

## 2020-09-02 ENCOUNTER — OFFICE VISIT (OUTPATIENT)
Dept: FAMILY MEDICINE CLINIC | Age: 35
End: 2020-09-02
Payer: MEDICAID

## 2020-09-02 VITALS
WEIGHT: 237 LBS | HEIGHT: 60 IN | RESPIRATION RATE: 18 BRPM | TEMPERATURE: 98 F | BODY MASS INDEX: 46.53 KG/M2 | SYSTOLIC BLOOD PRESSURE: 138 MMHG | OXYGEN SATURATION: 99 % | DIASTOLIC BLOOD PRESSURE: 98 MMHG | HEART RATE: 103 BPM

## 2020-09-02 DIAGNOSIS — G44.211 INTRACTABLE EPISODIC TENSION-TYPE HEADACHE: ICD-10-CM

## 2020-09-02 DIAGNOSIS — I10 ESSENTIAL HYPERTENSION: Primary | ICD-10-CM

## 2020-09-02 DIAGNOSIS — F41.9 ANXIETY: ICD-10-CM

## 2020-09-02 DIAGNOSIS — R73.03 PRE-DIABETES: ICD-10-CM

## 2020-09-02 LAB
ALBUMIN SERPL-MCNC: 3.7 G/DL (ref 3.5–5)
ALBUMIN/GLOB SERPL: 1 {RATIO} (ref 1.1–2.2)
ALP SERPL-CCNC: 82 U/L (ref 45–117)
ALT SERPL-CCNC: 14 U/L (ref 12–78)
ANION GAP SERPL CALC-SCNC: 7 MMOL/L (ref 5–15)
AST SERPL-CCNC: 8 U/L (ref 15–37)
BASOPHILS # BLD: 0 K/UL (ref 0–0.1)
BASOPHILS NFR BLD: 0 % (ref 0–1)
BILIRUB SERPL-MCNC: 0.2 MG/DL (ref 0.2–1)
BUN SERPL-MCNC: 12 MG/DL (ref 6–20)
BUN/CREAT SERPL: 18 (ref 12–20)
CALCIUM SERPL-MCNC: 9.3 MG/DL (ref 8.5–10.1)
CHLORIDE SERPL-SCNC: 103 MMOL/L (ref 97–108)
CO2 SERPL-SCNC: 28 MMOL/L (ref 21–32)
CREAT SERPL-MCNC: 0.68 MG/DL (ref 0.55–1.02)
DIFFERENTIAL METHOD BLD: ABNORMAL
EOSINOPHIL # BLD: 0.3 K/UL (ref 0–0.4)
EOSINOPHIL NFR BLD: 4 % (ref 0–7)
ERYTHROCYTE [DISTWIDTH] IN BLOOD BY AUTOMATED COUNT: 13.3 % (ref 11.5–14.5)
EST. AVERAGE GLUCOSE BLD GHB EST-MCNC: 134 MG/DL
GLOBULIN SER CALC-MCNC: 3.7 G/DL (ref 2–4)
GLUCOSE SERPL-MCNC: 106 MG/DL (ref 65–100)
HBA1C MFR BLD: 6.3 % (ref 4–5.6)
HCT VFR BLD AUTO: 42.6 % (ref 35–47)
HGB BLD-MCNC: 12.9 G/DL (ref 11.5–16)
IMM GRANULOCYTES # BLD AUTO: 0 K/UL (ref 0–0.04)
IMM GRANULOCYTES NFR BLD AUTO: 0 % (ref 0–0.5)
LYMPHOCYTES # BLD: 2.1 K/UL (ref 0.8–3.5)
LYMPHOCYTES NFR BLD: 31 % (ref 12–49)
MCH RBC QN AUTO: 29 PG (ref 26–34)
MCHC RBC AUTO-ENTMCNC: 30.3 G/DL (ref 30–36.5)
MCV RBC AUTO: 95.7 FL (ref 80–99)
MONOCYTES # BLD: 0.5 K/UL (ref 0–1)
MONOCYTES NFR BLD: 8 % (ref 5–13)
NEUTS SEG # BLD: 3.8 K/UL (ref 1.8–8)
NEUTS SEG NFR BLD: 57 % (ref 32–75)
NRBC # BLD: 0 K/UL (ref 0–0.01)
NRBC BLD-RTO: 0 PER 100 WBC
PLATELET # BLD AUTO: 301 K/UL (ref 150–400)
PMV BLD AUTO: 8.8 FL (ref 8.9–12.9)
POTASSIUM SERPL-SCNC: 4.1 MMOL/L (ref 3.5–5.1)
PROT SERPL-MCNC: 7.4 G/DL (ref 6.4–8.2)
RBC # BLD AUTO: 4.45 M/UL (ref 3.8–5.2)
SODIUM SERPL-SCNC: 138 MMOL/L (ref 136–145)
TSH SERPL DL<=0.05 MIU/L-ACNC: 1.03 UIU/ML (ref 0.36–3.74)
WBC # BLD AUTO: 6.7 K/UL (ref 3.6–11)

## 2020-09-02 PROCEDURE — 99214 OFFICE O/P EST MOD 30 MIN: CPT | Performed by: FAMILY MEDICINE

## 2020-09-02 RX ORDER — VENLAFAXINE HYDROCHLORIDE 37.5 MG/1
37.5 CAPSULE, EXTENDED RELEASE ORAL DAILY
Qty: 30 CAP | Refills: 2 | Status: SHIPPED | OUTPATIENT
Start: 2020-09-02 | End: 2020-10-27 | Stop reason: DRUGHIGH

## 2020-09-02 RX ORDER — METFORMIN HYDROCHLORIDE 500 MG/1
500 TABLET ORAL
Qty: 90 TAB | Refills: 1 | Status: SHIPPED | OUTPATIENT
Start: 2020-09-02 | End: 2021-04-06 | Stop reason: SDUPTHER

## 2020-09-02 NOTE — PROGRESS NOTES
Subjective:     Threasa Goltz is a 38 y.o. female who presents for follow up of hypertension and prediates. Hypertension:   - Diet and Lifestyle: generally follows a low sodium diet, nonsmoker, has increased her level of physical activity  - Home BP Monitoring: is well controlled at home, typically in the 130's/80's     BP Readings from Last 5 Encounters:   09/02/20 (!) 144/103   03/05/20 120/70   01/21/20 128/80   01/16/20 124/80   10/22/19 132/80       Cardiovascular ROS: taking medications as instructed, no medication side effects noted, no chest pain on exertion, no dyspnea on exertion, no swelling of ankles, no orthostatic dizziness or lightheadedness, no orthopnea or paroxysmal nocturnal dyspnea. Prediabetes: compliant with metformin. Tolerating without adverse side effects. New concerns:   Headache: has headache currently. Reports pain at the back of the head and neck which has been occurring twice daily for the past few weeks. Has been associated with intermittent pain in the right eye and hand tingling. Episodes last for 5-10 minutes. Triggers: typically occurs while at work, stress. Treatment to date: breathing and closing her eyes. Anxiety: onset several months ago. Typically worse when she is at work; reports there have been issues with management. States that she is emotional by the end of her day and is feeling burned out. Does have her good days. Previously treated with Buspar, but she self-discontinued due to feeling lightheaded and nauseous while taking. Feels that this has been contributing to her headaches and also her elevated BP today. Current Outpatient Medications   Medication Sig Dispense Refill    ondansetron (ZOFRAN ODT) 4 mg disintegrating tablet Take 1 Tab by mouth every eight (8) hours as needed for Nausea or Vomiting.  20 Tab 1    albuterol (PROVENTIL HFA, VENTOLIN HFA, PROAIR HFA) 90 mcg/actuation inhaler Take 1 Puff by inhalation every four (4) hours as needed for Wheezing. 1 Inhaler 1    hydroCHLOROthiazide (HYDRODIURIL) 12.5 mg tablet TAKE 1 TABLET BY MOUTH EVERY DAY 90 Tab 1    INTRAUTERINE DEVICE, IUD, IU by IntraUTERine route. NEW IUD PLACED 08/26/2020      metFORMIN (GLUCOPHAGE) 500 mg tablet Take 1 Tab by mouth daily (with dinner). 90 Tab 1    aspirin delayed-release 81 mg tablet Take  by mouth daily.  Blood-Glucose Meter monitoring kit Check fasting blood glucose daily as directed.  Please dispense Freestyle Lite meter 1 Kit 0       Allergies   Allergen Reactions    Morphine Hives    Pcn [Penicillins] Hives     Tolerates amoxicillin though       Past Medical History:   Diagnosis Date    Anemia     Depression 5/20/2013    Diabetes ([address]     Essential hypertension     Hypertension     IBS (irritable bowel syndrome)        Social History     Tobacco Use    Smoking status: Never Smoker    Smokeless tobacco: Never Used   Substance Use Topics    Alcohol use: Yes     Comment: occas        Lab Results   Component Value Date/Time    WBC 8.5 08/12/2019 02:34 PM    HGB 12.0 08/12/2019 02:34 PM    Hemoglobin (POC) 12.2 06/30/2015 10:34 PM    HCT 38.9 08/12/2019 02:34 PM    Hematocrit (POC) 36 06/30/2015 10:34 PM    PLATELET 499 33/49/2796 02:34 PM    MCV 93 08/12/2019 02:34 PM    Hgb, External 10.4 g/dL (low) 10/10/2013    Hct, External 31.8% (low) 10/10/2013    Platelet cnt., External 297 x10E3/uL  10/10/2013       Lab Results   Component Value Date/Time    Sodium 143 08/12/2019 02:34 PM    Potassium 4.4 08/12/2019 02:34 PM    Chloride 105 08/12/2019 02:34 PM    CO2 21 08/12/2019 02:34 PM    Anion gap 8 02/28/2019 09:02 AM    Glucose 84 08/12/2019 02:34 PM    BUN 11 08/12/2019 02:34 PM    Creatinine 0.81 08/12/2019 02:34 PM    BUN/Creatinine ratio 14 08/12/2019 02:34 PM    GFR est  08/12/2019 02:34 PM    GFR est non-AA 94 08/12/2019 02:34 PM    Calcium 9.3 08/12/2019 02:34 PM    Bilirubin, total 0.2 08/12/2019 02:34 PM    ALT (SGPT) 8 08/12/2019 02:34 PM    Alk. phosphatase 64 08/12/2019 02:34 PM    Protein, total 6.9 08/12/2019 02:34 PM    Albumin 4.1 08/12/2019 02:34 PM    Globulin 3.9 06/28/2017 12:09 AM    A-G Ratio 1.5 08/12/2019 02:34 PM        Lab Results   Component Value Date/Time    Hemoglobin A1c 6.2 (H) 08/12/2019 02:34 PM         Review of Systems, additional:  Pertinent items are noted in HPI. Objective:     Vitals:    09/02/20 1023 09/02/20 1055   BP: (!) 144/103 (!) 138/98  Comment: manual   BP 1 Location: Left arm Right arm   BP Patient Position: Sitting Sitting   Pulse: (!) 103    Resp: 18    Temp: 98 °F (36.7 °C)    TempSrc: Oral    SpO2: 99%    Weight: 237 lb (107.5 kg)    Height: 5' (1.524 m)        General appearance - alert, well appearing, and in no distress  Mental status - alert, oriented to person, place, and time, normal mood, behavior, speech, dress, motor activity, and thought processes  Chest - clear to auscultation, no wheezes, rales or rhonchi, symmetric air entry, no tachypnea, retractions or cyanosis  Heart - normal rate, regular rhythm, normal S1, S2, no murmurs, rubs, clicks or gallops  Neurological - alert, oriented, normal speech, no focal findings or movement disorder noted, cranial nerves II through XII intact, normal muscle tone, no tremors, strength 5/5  Musculoskeletal - tightness across the shoulders    Lab review: orders written for new lab studies as appropriate; see orders. Assessment/Plan:   Navi Dooley is a 38 y.o. female seen today for:     1. Essential hypertension: elevated here today, but has been previously well controlled. Currently with headache and appears visibly upset. Will continue with current therapy with return in 2 weeks for BP check. Check labs. - TSH 3RD GENERATION  - CBC WITH AUTOMATED DIFF  - METABOLIC PANEL, COMPREHENSIVE    2. Pre-diabetes: continue with metformin, check A1c.   - metFORMIN (GLUCOPHAGE) 500 mg tablet; Take 1 Tab by mouth daily (with dinner). Dispense: 90 Tab; Refill: 1  - HEMOGLOBIN A1C WITH EAG    3. Anxiety: worsening. Start Effexor and titrate dose based upon tolerability and effectiveness. Medication side effects discussed. - venlafaxine-SR (EFFEXOR-XR) 37.5 mg capsule; Take 1 Cap by mouth daily. Dispense: 30 Cap; Refill: 2    4. Intractable episodic tension-type headache: relaxation discussed. Neurological examination non-focal. Symptomatic therapies suggested. I have discussed the diagnosis with the patient and the intended plan as seen in the above orders. The patient has received an after-visit summary and questions were answered concerning future plans. I have discussed medication side effects and warnings with the patient as well. Patient verbalizes understanding of plan of care and denies further questions or concerns at this time. Informed patient to return to the office if symptoms worsen or if new symptoms arise. Follow-up and Dispositions    · Return in about 2 weeks (around 9/16/2020) for hypertension and anxiety follow up.

## 2020-09-02 NOTE — PATIENT INSTRUCTIONS
A Healthy Lifestyle: Care Instructions Your Care Instructions A healthy lifestyle can help you feel good, stay at a healthy weight, and have plenty of energy for both work and play. A healthy lifestyle is something you can share with your whole family. A healthy lifestyle also can lower your risk for serious health problems, such as high blood pressure, heart disease, and diabetes. You can follow a few steps listed below to improve your health and the health of your family. Follow-up care is a key part of your treatment and safety. Be sure to make and go to all appointments, and call your doctor if you are having problems. It's also a good idea to know your test results and keep a list of the medicines you take. How can you care for yourself at home? · Do not eat too much sugar, fat, or fast foods. You can still have dessert and treats now and then. The goal is moderation. · Start small to improve your eating habits. Pay attention to portion sizes, drink less juice and soda pop, and eat more fruits and vegetables. ? Eat a healthy amount of food. A 3-ounce serving of meat, for example, is about the size of a deck of cards. Fill the rest of your plate with vegetables and whole grains. ? Limit the amount of soda and sports drinks you have every day. Drink more water when you are thirsty. ? Eat at least 5 servings of fruits and vegetables every day. It may seem like a lot, but it is not hard to reach this goal. A serving or helping is 1 piece of fruit, 1 cup of vegetables, or 2 cups of leafy, raw vegetables. Have an apple or some carrot sticks as an afternoon snack instead of a candy bar. Try to have fruits and/or vegetables at every meal. 
· Make exercise part of your daily routine. You may want to start with simple activities, such as walking, bicycling, or slow swimming. Try to be active 30 to 60 minutes every day.  You do not need to do all 30 to 60 minutes all at once. For example, you can exercise 3 times a day for 10 or 20 minutes. Moderate exercise is safe for most people, but it is always a good idea to talk to your doctor before starting an exercise program. 
· Keep moving. Mariana Listen the lawn, work in the garden, or [employer]. Take the stairs instead of the elevator at work. · If you smoke, quit. People who smoke have an increased risk for heart attack, stroke, cancer, and other lung illnesses. Quitting is hard, but there are ways to boost your chance of quitting tobacco for good. ? Use nicotine gum, patches, or lozenges. ? Ask your doctor about stop-smoking programs and medicines. ? Keep trying. In addition to reducing your risk of diseases in the future, you will notice some benefits soon after you stop using tobacco. If you have shortness of breath or asthma symptoms, they will likely get better within a few weeks after you quit. · Limit how much alcohol you drink. Moderate amounts of alcohol (up to 2 drinks a day for men, 1 drink a day for women) are okay. But drinking too much can lead to liver problems, high blood pressure, and other health problems. Family health If you have a family, there are many things you can do together to improve your health. · Eat meals together as a family as often as possible. · Eat healthy foods. This includes fruits, vegetables, lean meats and dairy, and whole grains. · Include your family in your fitness plan. Most people think of activities such as jogging or tennis as the way to fitness, but there are many ways you and your family can be more active. Anything that makes you breathe hard and gets your heart pumping is exercise. Here are some tips: 
? Walk to do errands or to take your child to school or the bus. 
? Go for a family bike ride after dinner instead of watching TV. Where can you learn more? Go to http://salvatore-elton.info/ Enter A738 in the search box to learn more about \"A Healthy Lifestyle: Care Instructions. \" Current as of: January 31, 2020               Content Version: 12.5 © 0778-2486 [employer]. Care instructions adapted under license by [employer] (which disclaims liability or warranty for this information). If you have questions about a medical condition or this instruction, always ask your healthcare professional. [address] any warranty or liability for your use of this information. Tension Headache: Care Instructions Your Care Instructions Most headaches are tension headaches. These headaches tend to happen again, especially if you are under stress. A tension headache may cause pain or a feeling of pressure all over your head. You probably can't pinpoint the center of the pain. If you keep getting tension headaches, the best thing you can do to limit them is to find out what is causing them and then make changes in those areas. Follow-up care is a key part of your treatment and safety. Be sure to make and go to all appointments, and call your doctor if you are having problems. It's also a good idea to know your test results and keep a list of the medicines you take. How can you care for yourself at home? · Rest in a quiet, dark room with a cool cloth on your forehead until your headache is gone. Close your eyes, and try to relax or go to sleep. Don't watch TV or read. Avoid using the computer. · Use a warm, moist towel or a heating pad set on low to relax tight shoulder and neck muscles. · Have someone gently massage your neck and shoulders. · Take pain medicines exactly as directed. ? If the doctor gave you a prescription medicine for pain, take it as prescribed. ? If you are not taking a prescription pain medicine, ask your doctor if you can take an over-the-counter medicine.  
· Be careful not to take pain medicine more often than the instructions allow, because you may get worse or more frequent headaches when the medicine wears off. · If you get another tension headache, stop what you are doing and sit quietly for a moment. Close your eyes and breathe slowly. Try to relax your head and neck muscles. · Do not ignore new symptoms that occur with a headache, such as fever, weakness or numbness, vision changes, or confusion. These may be signs of a more serious problem. To help prevent headaches · Keep a headache diary so you can figure out what triggers your headaches. Avoiding triggers may help you prevent headaches. Record when each headache began, how long it lasted, and what the pain was like (throbbing, aching, stabbing, or dull). List anything that may have triggered the headache, such as being physically or emotionally stressed or being anxious or depressed. Other possible triggers are hunger, anger, fatigue, poor posture, and muscle strain. · Find healthy ways to deal with stress. Headaches are most common during or right after stressful times. Take time to relax before and after you do something that has caused a headache in the past. 
· Exercise daily to relieve stress. Relaxation exercises may help reduce tension. · Get plenty of sleep. · Eat regularly and well. Long periods without food can trigger a headache. · Treat yourself to a massage. Some people find that massages are very helpful in relieving tension. · Try to keep your muscles relaxed by keeping good posture. Check your jaw, face, neck, and shoulder muscles for tension, and try to relax them. When sitting at a desk, change positions often, and stretch for 30 seconds each hour. · Reduce eyestrain from computers by blinking frequently and looking away from the computer screen every so often. Make sure you have proper eyewear and that your monitor is set up properly, about an arm's length away. When should you call for help? [id number] anytime you think you may need emergency care. For example, call if: 
· You have signs of a stroke. These may include: 
? Sudden numbness, paralysis, or weakness in your face, arm, or leg, especially on only one side of your body. ? Sudden vision changes. ? Sudden trouble speaking. ? Sudden confusion or trouble understanding simple statements. ? Sudden problems with walking or balance. ? A sudden, severe headache that is different from past headaches. Call your doctor now or seek immediate medical care if: 
· You have new or worse nausea and vomiting. · You have a new or higher fever. · Your headache gets much worse. Watch closely for changes in your health, and be sure to contact your doctor if: 
· You are not getting better after 2 days (48 hours). Where can you learn more? Go to http://salvatore-elton.info/ Enter 84 17 85 in the search box to learn more about \"Tension Headache: Care Instructions. \" Current as of: November 20, 2019               Content Version: 12.5 © 0171-5162 [employer]. Care instructions adapted under license by [employer] (which disclaims liability or warranty for this information). If you have questions about a medical condition or this instruction, always ask your healthcare professional. [address] any warranty or liability for your use of this information.

## 2020-09-02 NOTE — LETTER
NOTIFICATION RETURN TO WORK / SCHOOL 
 
9/2/2020 10:58 AM 
 
Ms. Peter Johnson [phone] To Whom It May Concern: 
 
Peter Johnson is currently under the care of [address] [place] [address]. She will return to work/school on: 9/4/2020. If there are questions or concerns please have the patient contact our office. Sincerely, Heber Trevino MD

## 2020-09-02 NOTE — PROGRESS NOTES
Identified pt with two pt identifiers(name and ). Chief Complaint   Patient presents with    Anxiety     feels like stressors at work have been high and she has been having headaches and her right eye will twitch when this occurs    Hypertension     has been taking BP med everyday -     High Blood Sugar    Labs     has eaten since midnight    Headache     has headache today and BP is elevated in the office today        Health Maintenance Due   Topic    PAP AKA CERVICAL CYTOLOGY     A1C test (Diabetic or Prediabetic)     Flu Vaccine (1)       Wt Readings from Last 3 Encounters:   20 237 lb (107.5 kg)   20 234 lb (106.1 kg)   20 238 lb (108 kg)     Temp Readings from Last 3 Encounters:   20 98 °F (36.7 °C) (Oral)   20 98.1 °F (36.7 °C) (Oral)   20 97.7 °F (36.5 °C) (Oral)     BP Readings from Last 3 Encounters:   20 (!) 144/103   20 120/70   20 128/80     Pulse Readings from Last 3 Encounters:   20 (!) 103   20 90   20 78         Learning Assessment:  :     Learning Assessment 2016   PRIMARY LEARNER Patient   HIGHEST LEVEL OF EDUCATION - PRIMARY LEARNER  TRADE SCHOOL   BARRIERS PRIMARY LEARNER NONE   CO-LEARNER CAREGIVER No   PRIMARY LANGUAGE ENGLISH   LEARNER PREFERENCE PRIMARY DEMONSTRATION   ANSWERED BY patient   RELATIONSHIP SELF       Depression Screening:  :     3 most recent PHQ Screens 2020   Little interest or pleasure in doing things Not at all   Feeling down, depressed, irritable, or hopeless Not at all   Total Score PHQ 2 0       Fall Risk Assessment:  :     No flowsheet data found. Abuse Screening:  :     Abuse Screening Questionnaire 2020   Do you ever feel afraid of your partner? N N N   Are you in a relationship with someone who physically or mentally threatens you? N N N   Is it safe for you to go home?  Y Y Y       Coordination of Care Questionnaire:  :     1) Have you been to an emergency room, urgent care clinic since your last visit? no   Hospitalized since your last visit? no             2) Have you seen or consulted any other health care providers outside of [address] [place] [address] since your last visit? no  (Include any pap smears or colon screenings in this section.)    3) Do you have an Advance Directive on file? no  Are you interested in receiving information about Advance Directives? no    Patient is accompanied by self. Reviewed record in preparation for visit and have obtained necessary documentation. Medication reconciliation up to date and corrected with patient at this time.

## 2020-09-04 RX ORDER — METFORMIN HYDROCHLORIDE 500 MG/1
TABLET ORAL
Qty: 30 TAB | Refills: 5 | OUTPATIENT
Start: 2020-09-04

## 2020-09-17 ENCOUNTER — OFFICE VISIT (OUTPATIENT)
Dept: FAMILY MEDICINE CLINIC | Age: 35
End: 2020-09-17
Payer: MEDICAID

## 2020-09-17 VITALS
WEIGHT: 236.8 LBS | HEIGHT: 60 IN | BODY MASS INDEX: 46.49 KG/M2 | HEART RATE: 83 BPM | TEMPERATURE: 98.9 F | SYSTOLIC BLOOD PRESSURE: 132 MMHG | RESPIRATION RATE: 20 BRPM | OXYGEN SATURATION: 97 % | DIASTOLIC BLOOD PRESSURE: 88 MMHG

## 2020-09-17 DIAGNOSIS — I10 ESSENTIAL HYPERTENSION: ICD-10-CM

## 2020-09-17 DIAGNOSIS — E66.01 OBESITY, MORBID (HCC): ICD-10-CM

## 2020-09-17 DIAGNOSIS — J01.90 ACUTE RHINOSINUSITIS: Primary | ICD-10-CM

## 2020-09-17 DIAGNOSIS — Z71.3 DIETARY COUNSELING AND SURVEILLANCE: ICD-10-CM

## 2020-09-17 PROCEDURE — 99214 OFFICE O/P EST MOD 30 MIN: CPT | Performed by: FAMILY MEDICINE

## 2020-09-17 RX ORDER — LIRAGLUTIDE 6 MG/ML
INJECTION, SOLUTION SUBCUTANEOUS
Qty: 5 EACH | Refills: 2 | Status: SHIPPED | OUTPATIENT
Start: 2020-09-17

## 2020-09-17 RX ORDER — PEN NEEDLE, DIABETIC 30 GX3/16"
NEEDLE, DISPOSABLE MISCELLANEOUS
Qty: 50 PEN NEEDLE | Refills: 1 | Status: SHIPPED | OUTPATIENT
Start: 2020-09-17 | End: 2021-04-14

## 2020-09-17 RX ORDER — DOXYCYCLINE 100 MG/1
100 CAPSULE ORAL 2 TIMES DAILY
Qty: 14 CAP | Refills: 0 | Status: SHIPPED | OUTPATIENT
Start: 2020-09-17 | End: 2020-09-24

## 2020-09-17 NOTE — PROGRESS NOTES
Subjective:       Lina Valdes is a 38 y.o. female here for follow up. Pressure in the ears, pressure in the cheeks. Started yesterday. No fever. Associated with nasal congestion. Has treated with Francheska Babcock and Tylenol wiwillis minimal improvement. Obesity  Patient complains of obesity. Patient cites health as reasons for wanting to lose weight. History of Weight Loss Efforts  Successful weight loss techniques attempted: self-directed dieting  Unsuccessful weight loss techniques attempted: self-directed dieting    Current Exercise Habits housecleaning, walking    Current Eating Habits  Number of regular meals per day: a few  Number of snacking episodes per day: a few  Who shops for food? patient  Who prepares food? patient  Who eats with patient? patient  Binge behavior?: no  Purge behavior? no  Anorexic behavior? no  Eating precipitated by stress? no  Guilt feelings associated with eating? no    Other Potential Contributing Factors  Use of medications that may cause weight gain none  History of past abuse? none  Psych History: anxiety  Comorbidities: hypertension, prediabetes      Current Outpatient Medications   Medication Sig Dispense Refill    venlafaxine-SR (EFFEXOR-XR) 37.5 mg capsule Take 1 Cap by mouth daily. 30 Cap 2    metFORMIN (GLUCOPHAGE) 500 mg tablet Take 1 Tab by mouth daily (with dinner). 90 Tab 1    ondansetron (ZOFRAN ODT) 4 mg disintegrating tablet Take 1 Tab by mouth every eight (8) hours as needed for Nausea or Vomiting. 20 Tab 1    albuterol (PROVENTIL HFA, VENTOLIN HFA, PROAIR HFA) 90 mcg/actuation inhaler Take 1 Puff by inhalation every four (4) hours as needed for Wheezing. 1 Inhaler 1    hydroCHLOROthiazide (HYDRODIURIL) 12.5 mg tablet TAKE 1 TABLET BY MOUTH EVERY DAY 90 Tab 1    INTRAUTERINE DEVICE, IUD, IU by IntraUTERine route. NEW IUD PLACED 08/26/2020      aspirin delayed-release 81 mg tablet Take  by mouth daily.       Blood-Glucose Meter monitoring kit Check fasting blood glucose daily as directed. Please dispense Freestyle Lite meter 1 Kit 0       Allergies   Allergen Reactions    Morphine Hives    Pcn [Penicillins] Hives     Tolerates amoxicillin though       Past Medical History:   Diagnosis Date    Anemia     Depression 5/20/2013    Diabetes ([facility] [address]     Essential hypertension     Hypertension     IBS (irritable bowel syndrome)        Social History     Tobacco Use    Smoking status: Never Smoker    Smokeless tobacco: Never Used   Substance Use Topics    Alcohol use: Yes     Comment: occas        Review of Systems  Pertinent items are noted in HPI. Objective:     Visit Vitals  /88 (BP 1 Location: Right arm, BP Patient Position: Sitting)   Pulse 83   Temp 98.9 °F (37.2 °C) (Oral)   Resp 20   Ht 5' (1.524 m)   Wt 236 lb 12.8 oz (107.4 kg)   SpO2 97%   BMI 46.25 kg/m²      General appearance - alert, well appearing, and in no distress  Ears - bilateral TM's and external ear canals normal  Nasal exam - mucosal congestion, mucosal erythema and sinus tenderness noted maxillary sinuses   Neck - supple, no significant adenopathy  Chest - clear to auscultation, no wheezes, rales or rhonchi, symmetric air entry, no tachypnea, retractions or cyanosis  Heart - normal rate, regular rhythm, normal S1, S2, no murmurs, rubs, clicks or gallops    Assessment/Plan:   Taylor Hadley is a 38 y.o. female seen for:     1. Acute rhinosinusitis  - doxycycline (MONODOX) 100 mg capsule; Take 1 Cap by mouth two (2) times a day for 7 days. Dispense: 14 Cap; Refill: 0  - warm compresses to sinuses, OTC analgesic of choice for pain    2. Essential hypertension: improved from last visit. Continue with current therapy. 3. Obesity, morbid (HCC)  - Insulin Needles, Disposable, 31 gauge x 5/16\" ndle; Use daily with Saxenda Pen.   Dispense: 50 Pen Needle; Refill: 1  - Saxenda 3 mg/0.5 mL (18 mg/3 mL) pen; 0.6 mg once daily for 1 week; increase by 0.6 mg daily at weekly intervals to a target dose of 3 mg once daily. Dispense: 5 Each; Refill: 2  - I have reviewed/discussed the above normal BMI with the patient. I have recommended the following interventions: dietary management education, guidance, and counseling and encourage exercise. Refer to nutrition. Recommend at 150 minutes of moderate intensity exercise per week. 4. Dietary counseling and surveillance  - REFERRAL TO DIETITIAN    I have discussed the diagnosis with the patient and the intended plan as seen in the above orders. The patient has received an after-visit summary and questions were answered concerning future plans. I have discussed medication side effects and warnings with the patient as well. Patient verbalizes understanding of plan of care and denies further questions or concerns at this time. Informed patient to return to the office if symptoms worsen or if new symptoms arise.

## 2020-09-17 NOTE — PROGRESS NOTES
Identified pt with two pt identifiers(name and ). Chief Complaint   Patient presents with    Hypertension    Anxiety    Ear Pain     x1 day with sinus pain        Health Maintenance Due   Topic    PAP AKA CERVICAL CYTOLOGY     Flu Vaccine (1)       Wt Readings from Last 3 Encounters:   20 236 lb 12.8 oz (107.4 kg)   20 237 lb (107.5 kg)   20 234 lb (106.1 kg)     Temp Readings from Last 3 Encounters:   20 98.9 °F (37.2 °C) (Oral)   20 98 °F (36.7 °C) (Oral)   20 98.1 °F (36.7 °C) (Oral)     BP Readings from Last 3 Encounters:   20 132/88   20 (!) 138/98   20 120/70     Pulse Readings from Last 3 Encounters:   20 83   20 (!) 103   20 90         Learning Assessment:  :     Learning Assessment 2016   PRIMARY LEARNER Patient   HIGHEST LEVEL OF EDUCATION - PRIMARY LEARNER  TRADE SCHOOL   BARRIERS PRIMARY LEARNER NONE   CO-LEARNER CAREGIVER No   PRIMARY LANGUAGE ENGLISH   LEARNER PREFERENCE PRIMARY DEMONSTRATION   ANSWERED BY patient   RELATIONSHIP SELF       Depression Screening:  :     3 most recent PHQ Screens 2020   Little interest or pleasure in doing things Not at all   Feeling down, depressed, irritable, or hopeless Not at all   Total Score PHQ 2 0       Fall Risk Assessment:  :     No flowsheet data found. Abuse Screening:  :     Abuse Screening Questionnaire 2020   Do you ever feel afraid of your partner? N N N   Are you in a relationship with someone who physically or mentally threatens you? N N N   Is it safe for you to go home?  Y Y Y       Coordination of Care Questionnaire:  :     1) Have you been to an emergency room, urgent care clinic since your last visit? no   Hospitalized since your last visit? no             2) Have you seen or consulted any other health care providers outside of [address] [place] [address] since your last visit? no  (Include any pap smears or colon screenings in this section.)    3) Do you have an Advance Directive on file? no  Are you interested in receiving information about Advance Directives? no    Reviewed record in preparation for visit and have obtained necessary documentation.

## 2020-09-29 ENCOUNTER — VIRTUAL VISIT (OUTPATIENT)
Dept: FAMILY MEDICINE CLINIC | Age: 35
End: 2020-09-29
Payer: MEDICAID

## 2020-09-29 DIAGNOSIS — J01.90 ACUTE RHINOSINUSITIS: Primary | ICD-10-CM

## 2020-09-29 PROCEDURE — 99213 OFFICE O/P EST LOW 20 MIN: CPT | Performed by: FAMILY MEDICINE

## 2020-09-29 RX ORDER — PREDNISONE 5 MG/1
TABLET ORAL
Qty: 21 TAB | Refills: 0 | Status: SHIPPED | OUTPATIENT
Start: 2020-09-29 | End: 2020-10-27 | Stop reason: ALTCHOICE

## 2020-09-29 RX ORDER — LEVOFLOXACIN 500 MG/1
500 TABLET, FILM COATED ORAL DAILY
Qty: 7 TAB | Refills: 0 | Status: SHIPPED | OUTPATIENT
Start: 2020-09-29 | End: 2020-10-06

## 2020-09-29 NOTE — PROGRESS NOTES
Gloria Caldera is a 38 y.o. female    Chief Complaint   Patient presents with    Medication Evaluation     pt states she finished antibiotic 2 wks ago    Sinus Infection     f/u- still not feeling better and she's feeling worse. Health Maintenance Due   Topic Date Due    PAP AKA CERVICAL CYTOLOGY  07/05/2020    Flu Vaccine (1) 09/01/2020       3 most recent PHQ Screens 1/16/2020   Little interest or pleasure in doing things Not at all   Feeling down, depressed, irritable, or hopeless Not at all   Total Score PHQ 2 0       Abuse Screening Questionnaire 1/16/2020   Do you ever feel afraid of your partner? N   Are you in a relationship with someone who physically or mentally threatens you? N   Is it safe for you to go home? Y       1. Have you been to the ER, urgent care clinic since your last visit? Hospitalized since your last visit? No    2. Have you seen or consulted any other health care providers outside of the [address] [place] [address] since your last visit? Include any pap smears or colon screening.  No

## 2020-09-29 NOTE — PROGRESS NOTES
Aureliano Dickey is a 38 y.o. female who was seen by synchronous (real-time) audio-video technology on 9/29/2020 for Medication Evaluation (pt states she finished antibiotic 2 wks ago) and Sinus Infection (f/u- still not feeling better and she's feeling worse. )    Assessment & Plan:   Diagnoses and all orders for this visit:    1. Acute rhinosinusitis: s/p doxycycline with no improvement. Will treat as below. -     levoFLOXacin (LEVAQUIN) 500 mg tablet; Take 1 Tab by mouth daily for 7 days. -     predniSONE (STERAPRED) 5 mg dose pack; See administration instruction per 5mg dose pack  - Warm compresses to sinuses, nasal saline for congestion, OTC analgesic of choice for pain    Subjective:   Frontal and maxillary pressure, bilateral ear fullness and congestion, nasal congestion, purulent nasal drainage. Onset ~2 weeks ago and has not improved. No fevers. Associated with chills. Has completed course of doxycycline. Currently treating with Phoebe Weinberger Cold and Cough. No drainage noted. Prior to Admission medications    Medication Sig Start Date End Date Taking? Authorizing Provider   pseudoephed/acetaminophen/cpm (PHOEBE-SELTZER PLUS COLD PO) Take  by mouth as needed. Yes Provider, Historical   Insulin Needles, Disposable, 31 gauge x 5/16\" ndle Use daily with Saxenda Pen. 9/17/20  Yes Linda Cruz MD   Saxenda 3 mg/0.5 mL (18 mg/3 mL) pen 0.6 mg once daily for 1 week; increase by 0.6 mg daily at weekly intervals to a target dose of 3 mg once daily. 9/17/20  Yes Linda Cruz MD   venlafaxine-SR [facility] 37.5 mg capsule Take 1 Cap by mouth daily. 9/2/20  Yes Linda Cruz MD   metFORMIN (GLUCOPHAGE) 500 mg tablet Take 1 Tab by mouth daily (with dinner).  9/2/20  Yes Linda Cruz MD   ondansetron (ZOFRAN ODT) 4 mg disintegrating tablet Take 1 Tab by mouth every eight (8) hours as needed for Nausea or Vomiting. 6/15/20  Yes Linda Cruz MD   albuterol (PROVENTIL HFA, VENTOLIN HFA, PROAIR HFA) 90 mcg/actuation inhaler Take 1 Puff by inhalation every four (4) hours as needed for Wheezing. 5/28/20  Yes Tashi Darling MD   hydroCHLOROthiazide (HYDRODIURIL) 12.5 mg tablet TAKE 1 TABLET BY MOUTH EVERY DAY 3/25/20  Yes Tashi Darling MD   INTRAUTERINE DEVICE, IUD, IU by IntraUTERine route. NEW IUD PLACED 08/26/2020   Yes Provider, Historical   aspirin delayed-release 81 mg tablet Take  by mouth daily. Yes Provider, Historical   Blood-Glucose Meter monitoring kit Check fasting blood glucose daily as directed. Please dispense Freestyle Lite meter 12/27/16  Yes Tashi Darling MD       Allergies   Allergen Reactions    Morphine Hives    Pcn [Penicillins] Hives     Tolerates amoxicillin though     Past Medical History:   Diagnosis Date    Anemia     Depression 5/20/2013    Diabetes ([facility] [address]     Essential hypertension     Hypertension     IBS (irritable bowel syndrome)      Social History     Tobacco Use    Smoking status: Never Smoker    Smokeless tobacco: Never Used   Substance Use Topics    Alcohol use: Yes     Comment: occas       ROS  Pertinent items noted in HPI    Objective:     Patient-Reported Vitals 9/29/2020   Patient-Reported Height -   Patient-Reported Temperature 99.9      General: alert, cooperative, no distress   Mental  status: normal mood, behavior, speech, dress, motor activity, and thought processes, able to follow commands   HENT: NCAT   Neck: no visualized mass   Resp: no respiratory distress   Neuro: no gross deficits   Skin: no discoloration or lesions of concern on visible areas   Psychiatric: normal affect, consistent with stated mood, no evidence of hallucinations     We discussed the expected course, resolution and complications of the diagnosis(es) in detail. Medication risks, benefits, costs, interactions, and alternatives were discussed as indicated.   I advised her to contact the office if her condition worsens, changes or fails to improve as anticipated. She expressed understanding with the diagnosis(es) and plan. Peter Johnson, who was evaluated through a patient-initiated, synchronous (real-time) audio-video encounter, and/or her healthcare decision maker, is aware that it is a billable service, with coverage as determined by her insurance carrier. She provided verbal consent to proceed: Yes, and patient identification was verified. It was conducted pursuant to the emergency declaration under the [address] [place] [address] and the [employer] and [employer] General Act. A caregiver was present when appropriate. Ability to conduct physical exam was limited. I was in the office. The patient was at home.       Heber Trevino MD

## 2020-10-23 ENCOUNTER — TELEPHONE (OUTPATIENT)
Dept: FAMILY MEDICINE CLINIC | Age: 35
End: 2020-10-23

## 2020-10-23 NOTE — TELEPHONE ENCOUNTER
----- Message from Cristobal Eugene sent at 10/22/2020  6:23 PM EDT -----  Regarding: Dr. Rolo Payne (if not patient): pt    Relationship of caller (if not patient): self    Best contact number(s): [phone]    Name of medication and dosage if known: \"venlavaxine\"     Is patient out of this medication (yes/no): yes    Pharmacy name: [id number] listed in chart? (yes/no): yes    Pharmacy phone number: n/a in chart    Date of last visit: 9/29/20    Details to clarify the request: pt stated Dr. China Kumar told her to let her know if   she needed an increase in dosage on this medication and she says that she needs the dosage increased.

## 2020-10-27 ENCOUNTER — VIRTUAL VISIT (OUTPATIENT)
Dept: FAMILY MEDICINE CLINIC | Age: 35
End: 2020-10-27
Payer: MEDICAID

## 2020-10-27 DIAGNOSIS — R22.0 MOUTH SWELLING: Primary | ICD-10-CM

## 2020-10-27 DIAGNOSIS — J30.9 ALLERGIC RHINITIS, UNSPECIFIED SEASONALITY, UNSPECIFIED TRIGGER: ICD-10-CM

## 2020-10-27 DIAGNOSIS — F41.9 ANXIETY: ICD-10-CM

## 2020-10-27 PROCEDURE — 99213 OFFICE O/P EST LOW 20 MIN: CPT | Performed by: FAMILY MEDICINE

## 2020-10-27 RX ORDER — LEVOCETIRIZINE DIHYDROCHLORIDE 5 MG/1
5 TABLET, FILM COATED ORAL
Qty: 30 TAB | Refills: 5 | Status: SHIPPED | OUTPATIENT
Start: 2020-10-27 | End: 2021-05-18 | Stop reason: SDUPTHER

## 2020-10-27 RX ORDER — VENLAFAXINE HYDROCHLORIDE 75 MG/1
75 CAPSULE, EXTENDED RELEASE ORAL DAILY
Qty: 30 CAP | Refills: 2 | Status: SHIPPED | OUTPATIENT
Start: 2020-10-27 | End: 2021-01-04 | Stop reason: SDUPTHER

## 2020-10-27 NOTE — PROGRESS NOTES
Teretha Ormond is a 38 y.o. female, evaluated via audio-only technology on 10/27/2020 for Mouth Swelling    Assessment & Plan:   Diagnoses and all orders for this visit:    1. Mouth swelling: improved, monitor. 2. Anxiety: increase Effexor to 75 mg daily. -     venlafaxine-SR (EFFEXOR-XR) 75 mg capsule; Take 1 Cap by mouth daily. 3. Allergic rhinitis, unspecified seasonality, unspecified trigger  -     levocetirizine (XYZAL) 5 mg tablet; Take 1 Tab by mouth daily as needed for Allergies. Subjective:     Under the tongue swollen about 2 days ago. States that she had issues with pronunciation. Swelling improved with use of Benadryl. Did notice swollen neck glands. No fever. No throat closure, throat itchiness. No new foods or drinks. Feeling better today. Mild sore throat. No dysphagia. Has noticed dry mouth with use of Effexor. Medication has been effective, but feels that she could use a dose increae. Prior to Admission medications    Medication Sig Start Date End Date Taking? Authorizing Provider   pseudoephed/acetaminophen/cpm (PHOEBE-SELTZER PLUS COLD PO) Take  by mouth as needed. Yes Provider, Historical   Insulin Needles, Disposable, 31 gauge x 5/16\" ndle Use daily with Saxenda Pen. 9/17/20  Yes Carmen Duran MD   Saxenda 3 mg/0.5 mL (18 mg/3 mL) pen 0.6 mg once daily for 1 week; increase by 0.6 mg daily at weekly intervals to a target dose of 3 mg once daily. 9/17/20  Yes Carmen Duran MD   venlafaxine-SR [facility] 37.5 mg capsule Take 1 Cap by mouth daily. 9/2/20  Yes Carmen Duran MD   metFORMIN (GLUCOPHAGE) 500 mg tablet Take 1 Tab by mouth daily (with dinner).  9/2/20  Yes Carmen Duran MD   ondansetron (ZOFRAN ODT) 4 mg disintegrating tablet Take 1 Tab by mouth every eight (8) hours as needed for Nausea or Vomiting. 6/15/20  Yes Carmen Duran MD   albuterol (PROVENTIL HFA, VENTOLIN HFA, PROAIR HFA) 90 mcg/actuation inhaler Take 1 Puff by inhalation every four (4) hours as needed for Wheezing. 5/28/20  Yes Yeny Anaya MD   hydroCHLOROthiazide (HYDRODIURIL) 12.5 mg tablet TAKE 1 TABLET BY MOUTH EVERY DAY 3/25/20  Yes Yeny Anaya MD   INTRAUTERINE DEVICE, IUD, IU by IntraUTERine route. NEW IUD PLACED 08/26/2020   Yes Provider, Historical   aspirin delayed-release 81 mg tablet Take  by mouth daily. Yes Provider, Historical   Blood-Glucose Meter monitoring kit Check fasting blood glucose daily as directed. Please dispense Freestyle Lite meter 12/27/16  Yes Yeny Anaya MD   predniSONE (STERAPRED) 5 mg dose pack See administration instruction per 5mg dose pack 9/29/20 10/27/20  Yeny Anaya MD       Allergies   Allergen Reactions    Morphine Hives    Pcn [Penicillins] Hives     Tolerates amoxicillin though     Past Medical History:   Diagnosis Date    Anemia     Depression 5/20/2013    [facility] ([facility] [address]     Essential hypertension     Hypertension     IBS (irritable bowel syndrome)      Social History     Tobacco Use    Smoking status: Never Smoker    Smokeless tobacco: Never Used   Substance Use Topics    Alcohol use: Yes     Comment: occas       ROS   Pertinent items noted in HPI    Patient-Reported Vitals 9/29/2020   Patient-Reported Height -   Patient-Reported Temperature 99.9        Kyleigh Rondon, who was evaluated through a patient-initiated, synchronous (real-time) audio only encounter, and/or her healthcare decision maker, is aware that it is a billable service, with coverage as determined by her insurance carrier. She provided verbal consent to proceed: Yes. She has not had a related appointment within my department in the past 7 days or scheduled within the next 24 hours.       Total Time: minutes: 7    Colby Santos MD

## 2021-01-04 DIAGNOSIS — F41.9 ANXIETY: ICD-10-CM

## 2021-01-08 RX ORDER — VENLAFAXINE HYDROCHLORIDE 75 MG/1
75 CAPSULE, EXTENDED RELEASE ORAL DAILY
Qty: 30 CAP | Refills: 2 | Status: SHIPPED | OUTPATIENT
Start: 2021-01-08 | End: 2021-04-14 | Stop reason: SDUPTHER

## 2021-01-18 ENCOUNTER — VIRTUAL VISIT (OUTPATIENT)
Dept: FAMILY MEDICINE CLINIC | Age: 36
End: 2021-01-18
Payer: MEDICAID

## 2021-01-18 DIAGNOSIS — R42 DIZZINESS: Primary | ICD-10-CM

## 2021-01-18 PROCEDURE — 99213 OFFICE O/P EST LOW 20 MIN: CPT | Performed by: NURSE PRACTITIONER

## 2021-01-18 NOTE — PROGRESS NOTES
HISTORY OF PRESENT ILLNESS  Angelica Hernandez is a 38 y.o. female. HPI  Pt presents with \"light headed, headache, off balance\"  Visit was conducted via [email or web address]  Pt was located at home, provider was located at [facility]  Visit lasted 3 minutes  Angelica Hernandez, who was evaluated through a synchronous (real-time) audio-video encounter, and/or her healthcare decision maker, is aware that it is a billable service, with coverage as determined by her insurance carrier. She provided verbal consent to proceed: Yes, and patient identification was verified. It was conducted pursuant to the emergency declaration under the [facility], [address] [place] [address] authority and the [employer] and [employer] General Act. A caregiver was present when appropriate. Ability to conduct physical exam was limited. I was in the office. The patient was at home. Pt states that she has been having symptoms for about 3 days  Dizzy spells  Her \"brain feels foggy\"  Sometimes she has pain in her ears  She states that \"She has had fluid in her years for over a year\"  She states that the spells happen multiple times a day, with movement and at rest  She has had the symptoms about 3 times today  No headache  No nausea with the dizziness  Review of Systems   Constitutional: Negative for fever. Neurological: Positive for dizziness. Physical Exam  Constitutional:       Appearance: Normal appearance. Neurological:      Mental Status: She is alert and oriented to person, place, and time. ASSESSMENT and PLAN    ICD-10-CM ICD-9-CM    1. Dizziness  R42 780.4      Educated that I would want patient to be assessed in person today for symptoms  Should go to urgent care, or ER, at this time for assessment    Pt informed to return to office with worsening of symptoms, or PRN with any questions or concerns.   Pt verbalizes understanding of plan of care and denies further questions or concerns at this time.

## 2021-04-06 ENCOUNTER — VIRTUAL VISIT (OUTPATIENT)
Dept: FAMILY MEDICINE CLINIC | Age: 36
End: 2021-04-06
Payer: MEDICAID

## 2021-04-06 DIAGNOSIS — T88.1XXA LOCAL REACTION TO COVID-19 VACCINE: ICD-10-CM

## 2021-04-06 DIAGNOSIS — I10 ESSENTIAL HYPERTENSION: ICD-10-CM

## 2021-04-06 DIAGNOSIS — R73.03 PRE-DIABETES: ICD-10-CM

## 2021-04-06 DIAGNOSIS — L03.011 INFECTION OF NAIL BED OF FINGER OF RIGHT HAND: Primary | ICD-10-CM

## 2021-04-06 PROCEDURE — 99213 OFFICE O/P EST LOW 20 MIN: CPT | Performed by: NURSE PRACTITIONER

## 2021-04-06 RX ORDER — HYDROCHLOROTHIAZIDE 12.5 MG/1
TABLET ORAL
Qty: 30 TAB | Refills: 0 | Status: SHIPPED | OUTPATIENT
Start: 2021-04-06 | End: 2021-04-14 | Stop reason: SDUPTHER

## 2021-04-06 RX ORDER — SULFAMETHOXAZOLE AND TRIMETHOPRIM 800; 160 MG/1; MG/1
1 TABLET ORAL 2 TIMES DAILY
Qty: 20 TAB | Refills: 0 | Status: SHIPPED | OUTPATIENT
Start: 2021-04-06 | End: 2021-04-14 | Stop reason: ALTCHOICE

## 2021-04-06 RX ORDER — METFORMIN HYDROCHLORIDE 500 MG/1
500 TABLET ORAL
Qty: 30 TAB | Refills: 0 | Status: SHIPPED | OUTPATIENT
Start: 2021-04-06 | End: 2021-04-14 | Stop reason: SDUPTHER

## 2021-04-06 NOTE — PROGRESS NOTES
HISTORY OF PRESENT ILLNESS  Billey Sever is a 43 y.o. female. HPI  Pt presents with \"vaccine side effects\"  Visit was conducted via [employer]  Pt was located at home, provider was located at home  Visit lasted 4 minutes  Billey Sever, who was evaluated through a synchronous (real-time) audio-video encounter, and/or her healthcare decision maker, is aware that it is a billable service, with coverage as determined by her insurance carrier. She provided verbal consent to proceed: Yes, and patient identification was verified. This visit was conducted pursuant to the emergency declaration under the [facility], [address] [place] [address] authority and the [employer] Act. A caregiver was present when appropriate. Ability to conduct physical exam was limited. The patient was located in a state where the provider was credentialed to provide care. --Maude Singh, NP on 4/6/2021 at 3:06 PM    Pt states that she got the COVID vaccine on 3/29  She states that the area that she got the vaccine has a hardened knot  It is about the size of a silver dollar  She states that it is itchy    In addition, she believes that she has infection in her cuticles on her right hand  This has been present for about 3 days  There is some irritation and drainage around her cuticles on multiple fingers on her right hand  She has fake nails  She has no fever  She has been applying witch hazel, alcohol and hydrogen peroxide, without relief    Lastly, she is requesting refills of her BP medication and metformin. She is due for office visit and fasting labs. Review of Systems   Constitutional: Negative for fever. Skin: Positive for rash. Physical Exam  Constitutional:       Appearance: Normal appearance. Musculoskeletal:        Arms:    Neurological:      Mental Status: She is alert.    Psychiatric:         Behavior: Behavior normal. ASSESSMENT and PLAN    ICD-10-CM ICD-9-CM    1. Infection of nail bed of finger of right hand  L03.011 681.02 trimethoprim-sulfamethoxazole (BACTRIM DS, SEPTRA DS) 160-800 mg per tablet   2. Pre-diabetes  R73.03 790.29 metFORMIN (GLUCOPHAGE) 500 mg tablet   3. Essential hypertension  I10 401.9 hydroCHLOROthiazide (HYDRODIURIL) 12.5 mg tablet   4. Local reaction to COVID-19 vaccine  T88. 1XXA 999.9      Educated about taking medication as prescribed    Will refill for 30 days, but informed patient to make office appointment for fasting labs within 30 days    Educated about localized reaction  Can use OTC hydrocortisone, should it help with itching    Pt informed to return to office with worsening of symptoms, or PRN with any questions or concerns. Pt verbalizes understanding of plan of care and denies further questions or concerns at this time.

## 2021-04-14 ENCOUNTER — OFFICE VISIT (OUTPATIENT)
Dept: FAMILY MEDICINE CLINIC | Age: 36
End: 2021-04-14
Payer: MEDICAID

## 2021-04-14 VITALS
DIASTOLIC BLOOD PRESSURE: 92 MMHG | OXYGEN SATURATION: 99 % | WEIGHT: 239.2 LBS | RESPIRATION RATE: 18 BRPM | HEIGHT: 60 IN | BODY MASS INDEX: 46.96 KG/M2 | SYSTOLIC BLOOD PRESSURE: 138 MMHG | HEART RATE: 86 BPM

## 2021-04-14 DIAGNOSIS — R06.2 WHEEZING: ICD-10-CM

## 2021-04-14 DIAGNOSIS — Z71.3 DIETARY COUNSELING AND SURVEILLANCE: ICD-10-CM

## 2021-04-14 DIAGNOSIS — I10 ESSENTIAL HYPERTENSION: Primary | ICD-10-CM

## 2021-04-14 DIAGNOSIS — Z87.898 HISTORY OF TACHYCARDIA: ICD-10-CM

## 2021-04-14 DIAGNOSIS — M62.838 MUSCLE SPASM: ICD-10-CM

## 2021-04-14 DIAGNOSIS — F41.9 ANXIETY: ICD-10-CM

## 2021-04-14 DIAGNOSIS — R73.03 PRE-DIABETES: ICD-10-CM

## 2021-04-14 PROCEDURE — 99214 OFFICE O/P EST MOD 30 MIN: CPT | Performed by: FAMILY MEDICINE

## 2021-04-14 RX ORDER — HYDROCHLOROTHIAZIDE 12.5 MG/1
TABLET ORAL
Qty: 90 TAB | Refills: 3 | Status: SHIPPED | OUTPATIENT
Start: 2021-04-14

## 2021-04-14 RX ORDER — ALBUTEROL SULFATE 90 UG/1
1 AEROSOL, METERED RESPIRATORY (INHALATION)
Qty: 1 INHALER | Refills: 2 | Status: SHIPPED | OUTPATIENT
Start: 2021-04-14

## 2021-04-14 RX ORDER — METFORMIN HYDROCHLORIDE 500 MG/1
500 TABLET ORAL
Qty: 90 TAB | Refills: 3 | Status: SHIPPED | OUTPATIENT
Start: 2021-04-14

## 2021-04-14 RX ORDER — CYCLOBENZAPRINE HCL 10 MG
10 TABLET ORAL
Qty: 20 TAB | Refills: 0 | Status: SHIPPED | OUTPATIENT
Start: 2021-04-14

## 2021-04-14 RX ORDER — VENLAFAXINE HYDROCHLORIDE 75 MG/1
75 CAPSULE, EXTENDED RELEASE ORAL DAILY
Qty: 90 CAP | Refills: 3 | Status: SHIPPED | OUTPATIENT
Start: 2021-04-14

## 2021-04-14 NOTE — PROGRESS NOTES
Subjective:     José Sampson is a 43 y.o. female who presents for follow up of hypertension and prediabetes. Hypertension:   - Diet and Lifestyle: generally follows a low sodium diet, exercises sporadically, nonsmoker  - Home BP Monitoring: is not measured at home  - Has not taken this morning as she is fasting, will take after she eats    Cardiovascular ROS: no medication side effects noted, no TIA's, no dyspnea on exertion, no swelling of ankles. Prediabates: reports compliance with metformin. Does not monitor home BG. New concerns:  Pain on the right side x 2 weeks. Occurred after trying to exercise. Intermittent. Exacerbating with laying in certain movements. Alleviated with going to sleep while in pain. Having muscle spasms. Episodes may last for 5-10 minutes. Has treated with Tylenol with some relief. Worse at night. Reports increased HR at rest and exertion. Noticed over the last 3 weeks with wearing a smart watch. HR variable and reports highest rate of 127. Associated with intermittent, flutters. Denies syncope. Did feel lightheaded on one occasion. Has cut back on her caffeine intake. Does not feel anxious during these episodes. Episodes are short-lived. Current Outpatient Medications   Medication Sig Dispense Refill    metFORMIN (GLUCOPHAGE) 500 mg tablet Take 1 Tab by mouth daily (with dinner). 30 Tab 0    hydroCHLOROthiazide (HYDRODIURIL) 12.5 mg tablet TAKE 1 TABLET BY MOUTH EVERY DAY 30 Tab 0    venlafaxine-SR (EFFEXOR-XR) 75 mg capsule Take 1 Cap by mouth daily. 30 Cap 2    levocetirizine (XYZAL) 5 mg tablet Take 1 Tab by mouth daily as needed for Allergies. 30 Tab 5    pseudoephed/acetaminophen/cpm (PHOEBE-SELTZER PLUS COLD PO) Take  by mouth as needed.  Saxenda 3 mg/0.5 mL (18 mg/3 mL) pen 0.6 mg once daily for 1 week; increase by 0.6 mg daily at weekly intervals to a target dose of 3 mg once daily.  5 Each 2    ondansetron (ZOFRAN ODT) 4 mg disintegrating tablet Take 1 Tab by mouth every eight (8) hours as needed for Nausea or Vomiting. 20 Tab 1    albuterol (PROVENTIL HFA, VENTOLIN HFA, PROAIR HFA) 90 mcg/actuation inhaler Take 1 Puff by inhalation every four (4) hours as needed for Wheezing. 1 Inhaler 1    INTRAUTERINE DEVICE, IUD, IU by IntraUTERine route. NEW IUD PLACED 08/26/2020      aspirin delayed-release 81 mg tablet Take  by mouth daily.  Blood-Glucose Meter monitoring kit Check fasting blood glucose daily as directed.  Please dispense Freestyle Lite meter 1 Kit 0       Allergies   Allergen Reactions    Morphine Hives    Pcn [Penicillins] Hives     Tolerates amoxicillin though       Past Medical History:   Diagnosis Date    Anemia     Depression 5/20/2013    Diabetes ([address]     Essential hypertension     Hypertension     IBS (irritable bowel syndrome)        Social History     Tobacco Use    Smoking status: Never Smoker    Smokeless tobacco: Never Used   Substance Use Topics    Alcohol use: Yes     Comment: occas        Lab Results   Component Value Date/Time    WBC 6.7 09/02/2020 10:59 AM    HGB 12.9 09/02/2020 10:59 AM    Hemoglobin (POC) 12.2 06/30/2015 10:34 PM    HCT 42.6 09/02/2020 10:59 AM    Hematocrit (POC) 36 06/30/2015 10:34 PM    PLATELET 908 42/36/3304 10:59 AM    MCV 95.7 09/02/2020 10:59 AM    Hgb, External 10.4 g/dL (low) 10/10/2013    Hct, External 31.8% (low) 10/10/2013    Platelet cnt., External 297 x10E3/uL  10/10/2013       Lab Results   Component Value Date/Time    Sodium 138 09/02/2020 10:59 AM    Potassium 4.1 09/02/2020 10:59 AM    Chloride 103 09/02/2020 10:59 AM    CO2 28 09/02/2020 10:59 AM    Anion gap 7 09/02/2020 10:59 AM    Glucose 106 (H) 09/02/2020 10:59 AM    BUN 12 09/02/2020 10:59 AM    Creatinine 0.68 09/02/2020 10:59 AM    BUN/Creatinine ratio 18 09/02/2020 10:59 AM    GFR est AA >60 09/02/2020 10:59 AM    GFR est non-AA >60 09/02/2020 10:59 AM    Calcium 9.3 09/02/2020 10:59 AM    Bilirubin, total 0.2 09/02/2020 10:59 AM    ALT (SGPT) 14 09/02/2020 10:59 AM    Alk. phosphatase 82 09/02/2020 10:59 AM    Protein, total 7.4 09/02/2020 10:59 AM    Albumin 3.7 09/02/2020 10:59 AM    Globulin 3.7 09/02/2020 10:59 AM    A-G Ratio 1.0 (L) 09/02/2020 10:59 AM        Lab Results   Component Value Date/Time    Hemoglobin A1c 6.3 (H) 09/02/2020 10:59 AM         Review of Systems, additional:  Pertinent items are noted in HPI. Objective:     Vitals:    04/14/21 0949 04/14/21 1009   BP: (!) 144/96 (!) 138/92  Comment: manual   BP 1 Location:  Right arm   BP Patient Position:  Sitting   BP Cuff Size:  Adult   Pulse: 86    Resp: 18    SpO2: 99%    Weight: 239 lb 3.2 oz (108.5 kg)    Height: 5' (1.524 m)    Body mass index is 46.72 kg/m². General appearance - alert, well appearing, and in no distress  Mental status - alert, oriented to person, place, and time, normal mood, behavior, speech, dress, motor activity, and thought processes  Eyes - pupils equal and reactive, extraocular eye movements intact  Neck - supple, no significant adenopathy  Chest - clear to auscultation, no wheezes, rales or rhonchi, symmetric air entry, no tachypnea, retractions or cyanosis  Heart - normal rate, regular rhythm, normal S1, S2, no murmurs, rubs, clicks or gallops  Musculoskeletal - (+) TTP of right    Lab review: orders written for new lab studies as appropriate; see orders. Assessment/Plan:   Shanika Ramirez is a 43 y.o. female seen today for:     1. Essential hypertension: elevated today, but has not taken medication. Continue with current therapy and follow up in 2 weeks for blood pressure check on medication. Check labs. - hydroCHLOROthiazide (HYDRODIURIL) 12.5 mg tablet; TAKE 1 TABLET BY MOUTH EVERY DAY  Dispense: 90 Tab; Refill: 3  - TSH 3RD GENERATION; Future  - T4, FREE; Future  - CBC WITH AUTOMATED DIFF; Future  - METABOLIC PANEL, COMPREHENSIVE; Future    2. Muscle spasm  - cyclobenzaprine (FLEXERIL) 10 mg tablet;  Take 1 Tab by mouth three (3) times daily as needed for Muscle Spasm(s). Dispense: 20 Tab; Refill: 0  - gentle stretches, continue with Tylenol PRN for pain    3. Pre-diabetes: last A1c 6.3%, continue with metformin and check A1c.   - metFORMIN (GLUCOPHAGE) 500 mg tablet; Take 1 Tab by mouth daily (with dinner). Dispense: 90 Tab; Refill: 3  - HEMOGLOBIN A1C WITH EAG; Future    4. Anxiety: stable and has new job which has been helpful in improving her anxiety. Continue with current therapy. - venlafaxine-SR (EFFEXOR-XR) 75 mg capsule; Take 1 Cap by mouth daily. Dispense: 90 Cap; Refill: 3    5. Wheezing: secondary to pollen/allergies. Continue with albuterol PRN. - albuterol (PROVENTIL HFA, VENTOLIN HFA, PROAIR HFA) 90 mcg/actuation inhaler; Take 1 Puff by inhalation every four (4) hours as needed for Wheezing. Dispense: 1 Inhaler; Refill: 2    6. Dietary counseling and surveillance: would like to be referred back to Nutrition.   - REFERRAL TO NUTRITION    7. History of tachycardia: per report, HR normal at this time. Check labs as above. Consider Cardiology evaluation if this is persistent or new symptoms arise. I have discussed the diagnosis with the patient and the intended plan as seen in the above orders. The patient has received an after-visit summary and questions were answered concerning future plans. I have discussed medication side effects and warnings with the patient as well. Patient verbalizes understanding of plan of care and denies further questions or concerns at this time. Informed patient to return to the office if symptoms worsen or if new symptoms arise. Follow-up and Dispositions    · Return in about 2 weeks (around 4/28/2021) for blood pressure check.

## 2021-04-14 NOTE — PATIENT INSTRUCTIONS
A Healthy Lifestyle: Care Instructions Your Care Instructions A healthy lifestyle can help you feel good, stay at a healthy weight, and have plenty of energy for both work and play. A healthy lifestyle is something you can share with your whole family. A healthy lifestyle also can lower your risk for serious health problems, such as high blood pressure, heart disease, and diabetes. You can follow a few steps listed below to improve your health and the health of your family. Follow-up care is a key part of your treatment and safety. Be sure to make and go to all appointments, and call your doctor if you are having problems. It's also a good idea to know your test results and keep a list of the medicines you take. How can you care for yourself at home? · Do not eat too much sugar, fat, or fast foods. You can still have dessert and treats now and then. The goal is moderation. · Start small to improve your eating habits. Pay attention to portion sizes, drink less juice and soda pop, and eat more fruits and vegetables. ? Eat a healthy amount of food. A 3-ounce serving of meat, for example, is about the size of a deck of cards. Fill the rest of your plate with vegetables and whole grains. ? Limit the amount of soda and sports drinks you have every day. Drink more water when you are thirsty. ? Eat plenty of fruits and vegetables every day. Have an apple or some carrot sticks as an afternoon snack instead of a candy bar. Try to have fruits and/or vegetables at every meal. 
· Make exercise part of your daily routine. You may want to start with simple activities, such as walking, bicycling, or slow swimming. Try to be active 30 to 60 minutes every day. You do not need to do all 30 to 60 minutes all at once. For example, you can exercise 3 times a day for 10 or 20 minutes.  Moderate exercise is safe for most people, but it is always a good idea to talk to your doctor before starting an exercise program. 
· Keep moving. Sudheer Davidson the lawn, work in the garden, or [employer]. Take the stairs instead of the elevator at work. · If you smoke, quit. People who smoke have an increased risk for heart attack, stroke, cancer, and other lung illnesses. Quitting is hard, but there are ways to boost your chance of quitting tobacco for good. ? Use nicotine gum, patches, or lozenges. ? Ask your doctor about stop-smoking programs and medicines. ? Keep trying. In addition to reducing your risk of diseases in the future, you will notice some benefits soon after you stop using tobacco. If you have shortness of breath or asthma symptoms, they will likely get better within a few weeks after you quit. · Limit how much alcohol you drink. Moderate amounts of alcohol (up to 2 drinks a day for men, 1 drink a day for women) are okay. But drinking too much can lead to liver problems, high blood pressure, and other health problems. Family health If you have a family, there are many things you can do together to improve your health. · Eat meals together as a family as often as possible. · Eat healthy foods. This includes fruits, vegetables, lean meats and dairy, and whole grains. · Include your family in your fitness plan. Most people think of activities such as jogging or tennis as the way to fitness, but there are many ways you and your family can be more active. Anything that makes you breathe hard and gets your heart pumping is exercise. Here are some tips: 
? Walk to do errands or to take your child to school or the bus. 
? Go for a family bike ride after dinner instead of watching TV. Where can you learn more? Go to http://www.gray.com/ Enter B905 in the search box to learn more about \"A Healthy Lifestyle: Care Instructions. \" Current as of: September 23, 2020               Content Version: 12.8 © 7114-8351 [employer].   
Care instructions adapted under license by [employer] (which disclaims liability or warranty for this information). If you have questions about a medical condition or this instruction, always ask your healthcare professional. [address] any warranty or liability for your use of this information.

## 2021-04-14 NOTE — PROGRESS NOTES
Identified pt with two pt identifiers(name and ). Reviewed record in preparation for visit and have obtained necessary documentation. Chief Complaint   Patient presents with    Medication Evaluation      Pt states she has been having left side pain for about a week. Health Maintenance Due   Topic    Hepatitis C Screening     PAP AKA CERVICAL CYTOLOGY        Visit Vitals  BP (!) 144/96   Pulse 86   Resp 18   Ht 5' (1.524 m)   Wt 239 lb 3.2 oz (108.5 kg)   SpO2 99%   BMI 46.72 kg/m²         Coordination of Care Questionnaire:  :   1) Have you been to an emergency room, urgent care, or hospitalized since your last visit? If yes, where when, and reason for visit? NO      2. Have seen or consulted any other health care provider since your last visit? If yes, where when, and reason for visit? NO    Patient is accompanied by SELF   I have received verbal consent from Priscilla Kelsey to discuss any/all medical information while they are present in the room.

## 2021-04-15 LAB
ALBUMIN SERPL-MCNC: 3.9 G/DL (ref 3.5–5)
ALBUMIN/GLOB SERPL: 1.1 {RATIO} (ref 1.1–2.2)
ALP SERPL-CCNC: 78 U/L (ref 45–117)
ALT SERPL-CCNC: 19 U/L (ref 12–78)
ANION GAP SERPL CALC-SCNC: 6 MMOL/L (ref 5–15)
AST SERPL-CCNC: 9 U/L (ref 15–37)
BASOPHILS # BLD: 0.1 K/UL (ref 0–0.1)
BASOPHILS NFR BLD: 1 % (ref 0–1)
BILIRUB SERPL-MCNC: 0.4 MG/DL (ref 0.2–1)
BUN SERPL-MCNC: 10 MG/DL (ref 6–20)
BUN/CREAT SERPL: 14 (ref 12–20)
CALCIUM SERPL-MCNC: 9.2 MG/DL (ref 8.5–10.1)
CHLORIDE SERPL-SCNC: 106 MMOL/L (ref 97–108)
CO2 SERPL-SCNC: 26 MMOL/L (ref 21–32)
CREAT SERPL-MCNC: 0.69 MG/DL (ref 0.55–1.02)
DIFFERENTIAL METHOD BLD: NORMAL
EOSINOPHIL # BLD: 0.4 K/UL (ref 0–0.4)
EOSINOPHIL NFR BLD: 5 % (ref 0–7)
ERYTHROCYTE [DISTWIDTH] IN BLOOD BY AUTOMATED COUNT: 13.3 % (ref 11.5–14.5)
EST. AVERAGE GLUCOSE BLD GHB EST-MCNC: 137 MG/DL
GLOBULIN SER CALC-MCNC: 3.5 G/DL (ref 2–4)
GLUCOSE SERPL-MCNC: 117 MG/DL (ref 65–100)
HBA1C MFR BLD: 6.4 % (ref 4–5.6)
HCT VFR BLD AUTO: 39 % (ref 35–47)
HGB BLD-MCNC: 12.4 G/DL (ref 11.5–16)
IMM GRANULOCYTES # BLD AUTO: 0 K/UL (ref 0–0.04)
IMM GRANULOCYTES NFR BLD AUTO: 0 % (ref 0–0.5)
LYMPHOCYTES # BLD: 1.9 K/UL (ref 0.8–3.5)
LYMPHOCYTES NFR BLD: 27 % (ref 12–49)
MCH RBC QN AUTO: 29.1 PG (ref 26–34)
MCHC RBC AUTO-ENTMCNC: 31.8 G/DL (ref 30–36.5)
MCV RBC AUTO: 91.5 FL (ref 80–99)
MONOCYTES # BLD: 0.5 K/UL (ref 0–1)
MONOCYTES NFR BLD: 7 % (ref 5–13)
NEUTS SEG # BLD: 4.2 K/UL (ref 1.8–8)
NEUTS SEG NFR BLD: 60 % (ref 32–75)
NRBC # BLD: 0 K/UL (ref 0–0.01)
NRBC BLD-RTO: 0 PER 100 WBC
PLATELET # BLD AUTO: 323 K/UL (ref 150–400)
PMV BLD AUTO: 9.2 FL (ref 8.9–12.9)
POTASSIUM SERPL-SCNC: 4.2 MMOL/L (ref 3.5–5.1)
PROT SERPL-MCNC: 7.4 G/DL (ref 6.4–8.2)
RBC # BLD AUTO: 4.26 M/UL (ref 3.8–5.2)
SODIUM SERPL-SCNC: 138 MMOL/L (ref 136–145)
T4 FREE SERPL-MCNC: 1 NG/DL (ref 0.8–1.5)
TSH SERPL DL<=0.05 MIU/L-ACNC: 1.42 UIU/ML (ref 0.36–3.74)
WBC # BLD AUTO: 7.1 K/UL (ref 3.6–11)

## 2021-04-29 ENCOUNTER — TELEPHONE (OUTPATIENT)
Dept: FAMILY MEDICINE CLINIC | Age: 36
End: 2021-04-29

## 2021-04-29 NOTE — TELEPHONE ENCOUNTER
----- Message from Yisel Simmons sent at 4/29/2021  4:09 PM EDT -----  Regarding: Dr. Diaz Code: [phone]  General Message/Vendor Calls    Caller's first and last name: Pt.       Reason for call: Would like blood work results. Would also like to know if she can doctors not for 4/27/2021-4/29/2021, pt had vaccine and is having a reaction. Callback required yes/no and why: Yes, blood work results. Best contact number(s): [phone]      Details to clarify the request: N/a.       Yisel Simmons

## 2021-04-29 NOTE — TELEPHONE ENCOUNTER
----- Message from Juanita Barton sent at 4/29/2021  4:08 PM EDT -----  Regarding: Dr. Adalberto Gonzalez: [phone]  Medication Refill    Caller (if not patient): Pt.      Relationship of caller (if not patient): N/a. Best contact number(s): [phone]      Name of medication and dosage if known: Unknown name, muscle relaxer. Is patient out of this medication (yes/no): Yes. Pharmacy name: [facility].     Pharmacy listed in chart? (yes/no): Yes. Pharmacy phone number: N/a. Details to clarify the request: N/a.       Juanita Barton

## 2021-05-05 ENCOUNTER — TELEPHONE (OUTPATIENT)
Dept: FAMILY MEDICINE CLINIC | Age: 36
End: 2021-05-05

## 2021-05-05 NOTE — TELEPHONE ENCOUNTER
Called, spoke to patient. Two patient identifiers confirmed. Patient informed per Dr. Katrina Ochoa inform of the following:   - No anemia, electrolytes, kidney and liver function normal, thyroid function normal. Prediabetes is stable with A1c 6.4 %\"  Pt was asking what she could do about her weight loss to see what she could take. Pt advised Dr. Sebastian West will look into it and give her a call back. Patient verbalized understanding of information discussed w/ no further questions at this time.

## 2021-05-05 NOTE — TELEPHONE ENCOUNTER
Please inform of the following:   - No anemia, electrolytes, kidney and liver function normal, thyroid function normal. Prediabetes is stable with A1c 6.4 %.

## 2021-05-18 DIAGNOSIS — J30.9 ALLERGIC RHINITIS, UNSPECIFIED SEASONALITY, UNSPECIFIED TRIGGER: ICD-10-CM

## 2021-05-18 RX ORDER — LEVOCETIRIZINE DIHYDROCHLORIDE 5 MG/1
5 TABLET, FILM COATED ORAL
Qty: 90 TAB | Refills: 3 | Status: SHIPPED | OUTPATIENT
Start: 2021-05-18

## 2021-05-19 ENCOUNTER — VIRTUAL VISIT (OUTPATIENT)
Dept: FAMILY MEDICINE CLINIC | Age: 36
End: 2021-05-19
Payer: MEDICAID

## 2021-05-19 DIAGNOSIS — J01.90 ACUTE RHINOSINUSITIS: Primary | ICD-10-CM

## 2021-05-19 DIAGNOSIS — B37.9 ANTIBIOTIC-INDUCED YEAST INFECTION: ICD-10-CM

## 2021-05-19 DIAGNOSIS — T36.95XA ANTIBIOTIC-INDUCED YEAST INFECTION: ICD-10-CM

## 2021-05-19 PROCEDURE — 99441 PR PHYS/QHP TELEPHONE EVALUATION 5-10 MIN: CPT | Performed by: FAMILY MEDICINE

## 2021-05-19 RX ORDER — FLUCONAZOLE 150 MG/1
TABLET ORAL
Qty: 2 TABLET | Refills: 0 | Status: SHIPPED | OUTPATIENT
Start: 2021-05-19

## 2021-05-19 RX ORDER — LEVOFLOXACIN 500 MG/1
500 TABLET, FILM COATED ORAL DAILY
Qty: 7 TABLET | Refills: 0 | Status: SHIPPED | OUTPATIENT
Start: 2021-05-19 | End: 2021-05-26

## 2021-05-19 NOTE — PROGRESS NOTES
Joaquim Benitez is a 43 y.o. female, evaluated via audio-only technology on 5/19/2021 for Sinus Pain  . Assessment & Plan:   Diagnoses and all orders for this visit:    1. Acute rhinosinusitis  -     levoFLOXacin (LEVAQUIN) 500 mg tablet; Take 1 Tablet by mouth daily for 7 days. - Warm compresses to sinuses, nasal saline spray for nasal congestion, OTC analgesic of choice for pain     2. Antibiotic-induced yeast infection  -     fluconazole (DIFLUCAN) 150 mg tablet; Take 1 tablet by mouth today. Repeat dose in 3 days if still having symptoms. Subjective:   Slight fever, yellow nasal discharge, eye pain, and sinus pain and pressure. Onset for about 3 days. No fevers. Prior to Admission medications    Medication Sig Start Date End Date Taking? Authorizing Provider   levocetirizine (XYZAL) 5 mg tablet Take 1 Tab by mouth daily as needed for Allergies. 5/18/21   Modesto Woodruff MD   albuterol (PROVENTIL HFA, VENTOLIN HFA, PROAIR HFA) 90 mcg/actuation inhaler Take 1 Puff by inhalation every four (4) hours as needed for Wheezing. 4/14/21   [place]esto Woodruff MD   venlafaxine-SR [facility] 75 mg capsule Take 1 Cap by mouth daily. 4/14/21   Modesto Woodruff MD   hydroCHLOROthiazide (HYDRODIURIL) 12.5 mg tablet TAKE 1 TABLET BY MOUTH EVERY DAY 4/14/21   Modesto Woodruff MD   metFORMIN (GLUCOPHAGE) 500 mg tablet Take 1 Tab by mouth daily (with dinner). 4/14/21   Modesto Woodruff MD   cyclobenzaprine (FLEXERIL) 10 mg tablet Take 1 Tab by mouth three (3) times daily as needed for Muscle Spasm(s). 4/14/21   Modesto Woodruff MD   pseudoephed/acetaminophen/cpm (PHOEBE-SELTZER PLUS COLD PO) Take  by mouth as needed. Provider, Historical   Saxenda 3 mg/0.5 mL (18 mg/3 mL) pen 0.6 mg once daily for 1 week; increase by 0.6 mg daily at weekly intervals to a target dose of 3 mg once daily.  9/17/20   Modesto Woodruff MD   ondansetron (ZOFRAN ODT) 4 mg disintegrating tablet Take 1 Tab by mouth every eight (8) hours as needed for Nausea or Vomiting. 6/15/20   Maira Wilder MD   INTRAUTERINE DEVICE, IUD, IU by IntraUTERine route. NEW IUD PLACED 08/26/2020    Provider, Historical   aspirin delayed-release 81 mg tablet Take  by mouth daily. Provider, Historical   Blood-Glucose Meter monitoring kit Check fasting blood glucose daily as directed. Please dispense Freestyle Lite meter 12/27/16   Maira Wilder MD       Allergies   Allergen Reactions    Morphine Hives    Pcn [Penicillins] Hives     Tolerates amoxicillin though     Past Medical History:   Diagnosis Date    Anemia     Depression 5/20/2013    [facility] [address] [address] [facility]     Essential hypertension     Hypertension     IBS (irritable bowel syndrome)      Social History     Tobacco Use    Smoking status: Never Smoker    Smokeless tobacco: Never Used   Substance Use Topics    Alcohol use: Yes     Comment: occas       ROS   Pertinent items noted in HPI    Patient-Reported Vitals 9/29/2020   Patient-Reported Height -   Patient-Reported Temperature 99.9        Brent Shweta, who was evaluated through a patient-initiated, synchronous (real-time) audio only encounter, and/or her healthcare decision maker, is aware that it is a billable service, with coverage as determined by her insurance carrier. She provided verbal consent to proceed: Yes. She has not had a related appointment within my department in the past 7 days or scheduled within the next 24 hours.       Total Time: minutes: 5-10 minutes    Bryon Adames MD

## 2021-08-01 ENCOUNTER — HOSPITAL ENCOUNTER (EMERGENCY)
Age: 36
Discharge: HOME | End: 2021-08-01
Attending: EMERGENCY MEDICINE
Payer: MEDICAID

## 2021-08-01 VITALS
RESPIRATION RATE: 16 BRPM | BODY MASS INDEX: 46.8 KG/M2 | SYSTOLIC BLOOD PRESSURE: 136 MMHG | WEIGHT: 239.64 LBS | OXYGEN SATURATION: 98 % | HEART RATE: 96 BPM | DIASTOLIC BLOOD PRESSURE: 95 MMHG | TEMPERATURE: 97.5 F

## 2021-08-01 DIAGNOSIS — L60.9 IRREGULAR FINGER NAILS: Primary | ICD-10-CM

## 2021-08-01 PROCEDURE — 99282 EMERGENCY DEPT VISIT SF MDM: CPT

## 2021-08-01 NOTE — ED PROVIDER NOTES
43-year-old female presents with irritation of all of the fingers of her right and left hand. Patient states that she got her nails done several weeks ago and she has had excoriation at the tips of her nail since that time. She has been seen in urgent care and prescribed antifungals. She has not had any fevers. Past Medical History:   Diagnosis Date    Anemia     Depression 2013    Diabetes ([address]     Essential hypertension     Hypertension     IBS (irritable bowel syndrome)        Past Surgical History:   Procedure Laterality Date    [place]  DELIVERY ONLY      TWins at 28 wks; IOL; NRFS         Family History:   Problem Relation Age of Onset    Hypertension Mother     Bleeding Prob Mother         blood clots    Thyroid Disease Mother     Diabetes Father    Laura Felipe Elevated Lipids Father     Breast Cancer Paternal Aunt         breast    Cancer Maternal Grandmother         cervix    Uterine Cancer Maternal Grandmother 66        Hysterectomy and chemo    Colon Cancer Neg Hx     Ovarian Cancer Neg Hx        Social History     Socioeconomic History    Marital status: SINGLE     Spouse name: Not on file    Number of children: Not on file    Years of education: Not on file    Highest education level: Not on file   Occupational History    Not on file   Tobacco Use    Smoking status: Never Smoker    Smokeless tobacco: Never Used   Substance and Sexual Activity    Alcohol use: Yes     Comment: occas    Drug use: No    Sexual activity: Yes     Partners: Male     Birth control/protection: None, I.U.D. Comment: KJ;     Other Topics Concern    Not on file   Social History Narrative    Not on file     Social Determinants of Health     Financial Resource Strain:     Difficulty of Paying Living Expenses:    Food Insecurity:     Worried About Running Out of Food in the Last Year:     [address] in the Last Year:    Transportation Needs:     Lack of Transportation (Medical):  Lack of Transportation (Non-Medical):    Physical Activity:     Days of Exercise per Week:     Minutes of Exercise per Session:    Stress:     Feeling of Stress :    Social Connections:     Frequency of Communication with Friends and Family:     Frequency of Social Gatherings with Friends and Family:     Attends [employer] Services:     Active Member of Clubs or Organizations:     Attends Club or Organization Meetings:     Marital Status:    Intimate Partner Violence:     Fear of Current or Ex-Partner:     Emotionally Abused:     Physically Abused:     Sexually Abused: ALLERGIES: Morphine and Pcn [penicillins]    Review of Systems   Constitutional: Negative for fever. Skin: Positive for wound. Vitals:    08/01/21 1757   BP: (!) 136/95   Pulse: 96   Resp: 16   Temp: 97.5 °F (36.4 °C)   SpO2: 98%   Weight: 108.7 kg (239 lb 10.2 oz)            Physical Exam  Vitals and nursing note reviewed. Constitutional:       General: She is not in acute distress. Appearance: Normal appearance. She is not ill-appearing, toxic-appearing or diaphoretic. HENT:      Head: Normocephalic and atraumatic. Eyes:      Extraocular Movements: Extraocular movements intact. Cardiovascular:      Rate and Rhythm: Normal rate. Pulses: Normal pulses. Pulmonary:      Effort: Pulmonary effort is normal. No respiratory distress. Abdominal:      General: There is no distension. Musculoskeletal:         General: Normal range of motion. Cervical back: Normal range of motion. Skin:     General: Skin is dry. Comments: Peeling at the tips of all the fingers of both hands. No significant erythema or drainage from any of the fingers. Neurological:      Mental Status: She is alert and oriented to person, place, and time.    Psychiatric:         Mood and Affect: Mood normal.          MDM  Number of Diagnoses or Management Options  Irregular finger nails  Diagnosis management comments: Patient's presentation is consistent with allergic reaction or picking. I see no indication for antibiotics. Patient has completed a course of antifungals. I did recommend that she follow-up with dermatology. Discussed my clinical impression(s), any labs and/or radiology results with the patient. I answered any questions and addressed any concerns. Discussed the importance of following up with their primary care physician and/or specialist(s). Discussed signs or symptoms that would warrant return back to the ER for further evaluation. The patient is agreeable with discharge.          Procedures

## 2021-08-01 NOTE — ED TRIAGE NOTES
Pt presents to ED with c/o irritated fingertips after having her nails done about 3 weeks ago at a nail salon. The pt was seen at [facility] on 7/28/2021 and given a course of Diflucan and mupirocin cream. \"It's not any better.

## 2022-03-18 PROBLEM — F32.A MILD DEPRESSION: Status: ACTIVE | Noted: 2018-11-05

## 2022-03-20 PROBLEM — E66.01 OBESITY, MORBID (HCC): Status: ACTIVE | Noted: 2018-09-12

## 2023-04-12 NOTE — ED TRIAGE NOTES
Pt c/o L sided facial pain since 11pm yesterday. States has hx of dental issues, but does not feel this is dental related. Has been dx'd with sinus infection in past with same sx's. Pain extends from L side of neck up through jaw, around eyes, and into forehead. 12-Apr-2023 09:03

## 2023-07-05 ENCOUNTER — HOSPITAL ENCOUNTER (EMERGENCY)
Facility: HOSPITAL | Age: 38
Discharge: HOME | End: 2023-07-05
Attending: EMERGENCY MEDICINE
Payer: COMMERCIAL

## 2023-07-05 ENCOUNTER — APPOINTMENT (OUTPATIENT)
Facility: HOSPITAL | Age: 38
End: 2023-07-05
Payer: COMMERCIAL

## 2023-07-05 VITALS
DIASTOLIC BLOOD PRESSURE: 91 MMHG | HEART RATE: 84 BPM | BODY MASS INDEX: 44.75 KG/M2 | TEMPERATURE: 98.1 F | WEIGHT: 236.99 LBS | RESPIRATION RATE: 16 BRPM | SYSTOLIC BLOOD PRESSURE: 132 MMHG | HEIGHT: 61 IN | OXYGEN SATURATION: 97 %

## 2023-07-05 DIAGNOSIS — K62.5 RECTAL BLEEDING: ICD-10-CM

## 2023-07-05 DIAGNOSIS — H92.01 RIGHT EAR PAIN: ICD-10-CM

## 2023-07-05 DIAGNOSIS — R10.31 ABDOMINAL PAIN, RIGHT LOWER QUADRANT: Primary | ICD-10-CM

## 2023-07-05 DIAGNOSIS — R42 LIGHTHEADEDNESS: ICD-10-CM

## 2023-07-05 LAB
ANION GAP SERPL CALC-SCNC: 5 MMOL/L (ref 5–15)
APPEARANCE UR: CLEAR
BACTERIA URNS QL MICRO: NEGATIVE /HPF
BASOPHILS # BLD: 0 K/UL (ref 0–0.1)
BASOPHILS NFR BLD: 0 % (ref 0–1)
BILIRUB UR QL: NEGATIVE
BUN SERPL-MCNC: 14 MG/DL (ref 6–20)
BUN/CREAT SERPL: 18 (ref 12–20)
CALCIUM SERPL-MCNC: 9.1 MG/DL (ref 8.5–10.1)
CHLORIDE SERPL-SCNC: 104 MMOL/L (ref 97–108)
CO2 SERPL-SCNC: 29 MMOL/L (ref 21–32)
COLOR UR: ABNORMAL
CREAT SERPL-MCNC: 0.79 MG/DL (ref 0.55–1.02)
DIFFERENTIAL METHOD BLD: ABNORMAL
EOSINOPHIL # BLD: 0.4 K/UL (ref 0–0.4)
EOSINOPHIL NFR BLD: 5 % (ref 0–7)
EPITH CASTS URNS QL MICRO: ABNORMAL /LPF
ERYTHROCYTE [DISTWIDTH] IN BLOOD BY AUTOMATED COUNT: 13.2 % (ref 11.5–14.5)
GLUCOSE SERPL-MCNC: 100 MG/DL (ref 65–100)
GLUCOSE UR STRIP.AUTO-MCNC: NEGATIVE MG/DL
HCT VFR BLD AUTO: 37.1 % (ref 35–47)
HGB BLD-MCNC: 11.9 G/DL (ref 11.5–16)
HGB UR QL STRIP: ABNORMAL
IMM GRANULOCYTES # BLD AUTO: 0 K/UL (ref 0–0.04)
IMM GRANULOCYTES NFR BLD AUTO: 0 % (ref 0–0.5)
KETONES UR QL STRIP.AUTO: NEGATIVE MG/DL
LEUKOCYTE ESTERASE UR QL STRIP.AUTO: NEGATIVE
LYMPHOCYTES # BLD: 2.2 K/UL (ref 0.8–3.5)
LYMPHOCYTES NFR BLD: 28 % (ref 12–49)
MCH RBC QN AUTO: 29.3 PG (ref 26–34)
MCHC RBC AUTO-ENTMCNC: 32.1 G/DL (ref 30–36.5)
MCV RBC AUTO: 91.4 FL (ref 80–99)
MONOCYTES # BLD: 0.5 K/UL (ref 0–1)
MONOCYTES NFR BLD: 6 % (ref 5–13)
NEUTS SEG # BLD: 4.8 K/UL (ref 1.8–8)
NEUTS SEG NFR BLD: 60 % (ref 32–75)
NITRITE UR QL STRIP.AUTO: NEGATIVE
NRBC # BLD: 0 K/UL (ref 0–0.01)
NRBC BLD-RTO: 0 PER 100 WBC
PH UR STRIP: 6.5 (ref 5–8)
PLATELET # BLD AUTO: 320 K/UL (ref 150–400)
PMV BLD AUTO: 8.6 FL (ref 8.9–12.9)
POTASSIUM SERPL-SCNC: 3.9 MMOL/L (ref 3.5–5.1)
PROT UR STRIP-MCNC: NEGATIVE MG/DL
RBC # BLD AUTO: 4.06 M/UL (ref 3.8–5.2)
RBC #/AREA URNS HPF: ABNORMAL /HPF (ref 0–5)
SODIUM SERPL-SCNC: 138 MMOL/L (ref 136–145)
SP GR UR REFRACTOMETRY: 1.01 (ref 1–1.03)
TROPONIN I SERPL HS-MCNC: 4 NG/L (ref 0–51)
UROBILINOGEN UR QL STRIP.AUTO: 0.2 EU/DL (ref 0.2–1)
WBC # BLD AUTO: 7.9 K/UL (ref 3.6–11)
WBC URNS QL MICRO: ABNORMAL /HPF (ref 0–4)

## 2023-07-05 PROCEDURE — 84484 ASSAY OF TROPONIN QUANT: CPT

## 2023-07-05 PROCEDURE — 93005 ELECTROCARDIOGRAM TRACING: CPT | Performed by: EMERGENCY MEDICINE

## 2023-07-05 PROCEDURE — 85025 COMPLETE CBC W/AUTO DIFF WBC: CPT

## 2023-07-05 PROCEDURE — 6360000004 HC RX CONTRAST MEDICATION: Performed by: EMERGENCY MEDICINE

## 2023-07-05 PROCEDURE — 81001 URINALYSIS AUTO W/SCOPE: CPT

## 2023-07-05 PROCEDURE — 99285 EMERGENCY DEPT VISIT HI MDM: CPT

## 2023-07-05 PROCEDURE — 74177 CT ABD & PELVIS W/CONTRAST: CPT

## 2023-07-05 PROCEDURE — 2580000003 HC RX 258: Performed by: EMERGENCY MEDICINE

## 2023-07-05 PROCEDURE — 36415 COLL VENOUS BLD VENIPUNCTURE: CPT

## 2023-07-05 PROCEDURE — 80048 BASIC METABOLIC PNL TOTAL CA: CPT

## 2023-07-05 PROCEDURE — 81025 URINE PREGNANCY TEST: CPT

## 2023-07-05 PROCEDURE — 6370000000 HC RX 637 (ALT 250 FOR IP): Performed by: EMERGENCY MEDICINE

## 2023-07-05 RX ORDER — 0.9 % SODIUM CHLORIDE 0.9 %
1000 INTRAVENOUS SOLUTION INTRAVENOUS ONCE
Status: COMPLETED | OUTPATIENT
Start: 2023-07-05 | End: 2023-07-05

## 2023-07-05 RX ORDER — DICYCLOMINE HCL 20 MG
20 TABLET ORAL 4 TIMES DAILY
Qty: 28 TABLET | Refills: 0 | Status: SHIPPED | OUTPATIENT
Start: 2023-07-05 | End: 2023-07-12

## 2023-07-05 RX ORDER — DICYCLOMINE HYDROCHLORIDE 10 MG/1
20 CAPSULE ORAL
Status: COMPLETED | OUTPATIENT
Start: 2023-07-05 | End: 2023-07-05

## 2023-07-05 RX ADMIN — SODIUM CHLORIDE 1000 ML: 9 INJECTION, SOLUTION INTRAVENOUS at 19:25

## 2023-07-05 RX ADMIN — DICYCLOMINE HYDROCHLORIDE 20 MG: 10 CAPSULE ORAL at 19:25

## 2023-07-05 RX ADMIN — IOPAMIDOL 100 ML: 755 INJECTION, SOLUTION INTRAVENOUS at 20:12

## 2023-07-05 ASSESSMENT — PAIN - FUNCTIONAL ASSESSMENT: PAIN_FUNCTIONAL_ASSESSMENT: 0-10

## 2023-07-05 ASSESSMENT — PAIN DESCRIPTION - PAIN TYPE: TYPE: ACUTE PAIN

## 2023-07-05 ASSESSMENT — PAIN SCALES - GENERAL: PAINLEVEL_OUTOF10: 4

## 2023-07-05 ASSESSMENT — LIFESTYLE VARIABLES
HOW MANY STANDARD DRINKS CONTAINING ALCOHOL DO YOU HAVE ON A TYPICAL DAY: PATIENT DOES NOT DRINK
HOW OFTEN DO YOU HAVE A DRINK CONTAINING ALCOHOL: NEVER

## 2023-07-05 ASSESSMENT — PAIN DESCRIPTION - LOCATION: LOCATION: ABDOMEN

## 2023-07-05 ASSESSMENT — PAIN DESCRIPTION - ORIENTATION: ORIENTATION: RIGHT

## 2023-07-05 NOTE — ED TRIAGE NOTES
Patient present with multiple complaints 2 days of lightheadedness/dizziness, right ear feels like there is fluid in it, right abdominal pain with 4 episodes of bright red blood in stool back in June.

## 2023-07-06 LAB
EKG ATRIAL RATE: 83 BPM
EKG DIAGNOSIS: NORMAL
EKG P AXIS: 47 DEGREES
EKG P-R INTERVAL: 144 MS
EKG Q-T INTERVAL: 392 MS
EKG QRS DURATION: 74 MS
EKG QTC CALCULATION (BAZETT): 460 MS
EKG R AXIS: 43 DEGREES
EKG T AXIS: 21 DEGREES
EKG VENTRICULAR RATE: 83 BPM
HCG UR QL: NEGATIVE

## 2023-07-06 NOTE — DISCHARGE INSTRUCTIONS
Return to the emergency department for any new or worsening symptoms. In the meantime I have provided you with numbers to follow-up with gastroenterology as well as ENT. You should follow-up with your primary care doctor as well. Take the Bentyl as directed.

## 2023-07-06 NOTE — ED PROVIDER NOTES
[facility] EMERGENCY DEPT  EMERGENCY DEPARTMENT ENCOUNTER      Pt Name: Yan Doyle  MRN: [id number]  [address] [facility] 1985  Date of evaluation: 2023  Provider: Lamar Diaz MD      HISTORY OF PRESENT ILLNESS      HPI        Nursing Notes were reviewed. REVIEW OF SYSTEMS         Review of Systems        PAST MEDICAL HISTORY     Past Medical History:   Diagnosis Date    Anemia     Depression 2013    Diabetes ([address])     Essential hypertension     Hypertension     IBS (irritable bowel syndrome)          SURGICAL HISTORY       Past Surgical History:   Procedure Laterality Date     DELIVERY ONLY      TWins at 35 wks; IOL; NRFS         CURRENT MEDICATIONS       Previous Medications    ALBUTEROL SULFATE HFA (PROVENTIL;VENTOLIN;PROAIR) 108 (90 BASE) MCG/ACT INHALER    Inhale 1 puff into the lungs every 4 hours as needed    ASPIRIN 81 MG EC TABLET    Take by mouth daily    CYCLOBENZAPRINE (FLEXERIL) 10 MG TABLET    Take by mouth 3 times daily as needed    FLUCONAZOLE (DIFLUCAN) 150 MG TABLET    Take 1 tablet by mouth today. Repeat dose in 3 days if still having symptoms. HYDROCHLOROTHIAZIDE (HYDRODIURIL) 12.5 MG TABLET    TAKE 1 TABLET BY MOUTH EVERY DAY    LEVOCETIRIZINE (XYZAL) 5 MG TABLET    Take by mouth daily as needed    LIRAGLUTIDE-WEIGHT MANAGEMENT (SAXENDA) 18 MG/3ML SOPN    0.6 mg once daily for 1 week; increase by 0.6 mg daily at weekly intervals to a target dose of 3 mg once daily.     METFORMIN (GLUCOPHAGE) 500 MG TABLET    Take by mouth Daily with supper    ONDANSETRON (ZOFRAN-ODT) 4 MG DISINTEGRATING TABLET    Take by mouth every 8 hours as needed    VENLAFAXINE (EFFEXOR XR) 75 MG EXTENDED RELEASE CAPSULE    Take by mouth daily       ALLERGIES     Morphine and Penicillins    FAMILY HISTORY       Family History   Problem Relation Age of Onset    Hypertension Mother     Ovarian Cancer Neg Hx     Colon Cancer Neg Hx     Uterine Cancer Maternal Grandmother 77        Hysterectomy and

## 2023-09-17 ENCOUNTER — APPOINTMENT (OUTPATIENT)
Facility: HOSPITAL | Age: 38
End: 2023-09-17
Payer: COMMERCIAL

## 2023-09-17 ENCOUNTER — HOSPITAL ENCOUNTER (EMERGENCY)
Facility: HOSPITAL | Age: 38
Discharge: HOME | End: 2023-09-17
Attending: EMERGENCY MEDICINE
Payer: COMMERCIAL

## 2023-09-17 VITALS
SYSTOLIC BLOOD PRESSURE: 120 MMHG | HEART RATE: 89 BPM | HEIGHT: 61 IN | DIASTOLIC BLOOD PRESSURE: 68 MMHG | TEMPERATURE: 98.7 F | BODY MASS INDEX: 44.37 KG/M2 | RESPIRATION RATE: 14 BRPM | OXYGEN SATURATION: 98 % | WEIGHT: 235 LBS

## 2023-09-17 DIAGNOSIS — R07.9 CHEST PAIN, UNSPECIFIED TYPE: Primary | ICD-10-CM

## 2023-09-17 LAB
ALBUMIN SERPL-MCNC: 3.4 G/DL (ref 3.5–5)
ALBUMIN/GLOB SERPL: 0.8 (ref 1.1–2.2)
ALP SERPL-CCNC: 75 U/L (ref 45–117)
ALT SERPL-CCNC: 17 U/L (ref 12–78)
ANION GAP SERPL CALC-SCNC: 7 MMOL/L (ref 5–15)
AST SERPL-CCNC: 9 U/L (ref 15–37)
BASOPHILS # BLD: 0 K/UL (ref 0–0.1)
BASOPHILS NFR BLD: 0 % (ref 0–1)
BILIRUB SERPL-MCNC: 0.2 MG/DL (ref 0.2–1)
BUN SERPL-MCNC: 12 MG/DL (ref 6–20)
BUN/CREAT SERPL: 13 (ref 12–20)
CALCIUM SERPL-MCNC: 8.7 MG/DL (ref 8.5–10.1)
CHLORIDE SERPL-SCNC: 101 MMOL/L (ref 97–108)
CO2 SERPL-SCNC: 29 MMOL/L (ref 21–32)
CREAT SERPL-MCNC: 0.91 MG/DL (ref 0.55–1.02)
D DIMER PPP FEU-MCNC: 0.49 MG/L FEU (ref 0–0.65)
DIFFERENTIAL METHOD BLD: ABNORMAL
EOSINOPHIL # BLD: 0.3 K/UL (ref 0–0.4)
EOSINOPHIL NFR BLD: 3 % (ref 0–7)
ERYTHROCYTE [DISTWIDTH] IN BLOOD BY AUTOMATED COUNT: 12.8 % (ref 11.5–14.5)
GLOBULIN SER CALC-MCNC: 4.3 G/DL (ref 2–4)
GLUCOSE SERPL-MCNC: 117 MG/DL (ref 65–100)
HCT VFR BLD AUTO: 38.6 % (ref 35–47)
HGB BLD-MCNC: 12.7 G/DL (ref 11.5–16)
IMM GRANULOCYTES # BLD AUTO: 0 K/UL (ref 0–0.04)
IMM GRANULOCYTES NFR BLD AUTO: 0 % (ref 0–0.5)
LYMPHOCYTES # BLD: 2.9 K/UL (ref 0.8–3.5)
LYMPHOCYTES NFR BLD: 30 % (ref 12–49)
MCH RBC QN AUTO: 30.1 PG (ref 26–34)
MCHC RBC AUTO-ENTMCNC: 32.9 G/DL (ref 30–36.5)
MCV RBC AUTO: 91.5 FL (ref 80–99)
MONOCYTES # BLD: 0.7 K/UL (ref 0–1)
MONOCYTES NFR BLD: 7 % (ref 5–13)
NEUTS SEG # BLD: 5.7 K/UL (ref 1.8–8)
NEUTS SEG NFR BLD: 60 % (ref 32–75)
NRBC # BLD: 0 K/UL (ref 0–0.01)
NRBC BLD-RTO: 0 PER 100 WBC
PLATELET # BLD AUTO: 305 K/UL (ref 150–400)
PMV BLD AUTO: 8.8 FL (ref 8.9–12.9)
POTASSIUM SERPL-SCNC: 3.7 MMOL/L (ref 3.5–5.1)
PROT SERPL-MCNC: 7.7 G/DL (ref 6.4–8.2)
RBC # BLD AUTO: 4.22 M/UL (ref 3.8–5.2)
SODIUM SERPL-SCNC: 137 MMOL/L (ref 136–145)
TROPONIN I SERPL HS-MCNC: 5 NG/L (ref 0–51)
TROPONIN I SERPL HS-MCNC: 5 NG/L (ref 0–51)
WBC # BLD AUTO: 9.7 K/UL (ref 3.6–11)

## 2023-09-17 PROCEDURE — 99285 EMERGENCY DEPT VISIT HI MDM: CPT

## 2023-09-17 PROCEDURE — 85379 FIBRIN DEGRADATION QUANT: CPT

## 2023-09-17 PROCEDURE — 71045 X-RAY EXAM CHEST 1 VIEW: CPT

## 2023-09-17 PROCEDURE — 85025 COMPLETE CBC W/AUTO DIFF WBC: CPT

## 2023-09-17 PROCEDURE — 80053 COMPREHEN METABOLIC PANEL: CPT

## 2023-09-17 PROCEDURE — 84484 ASSAY OF TROPONIN QUANT: CPT

## 2023-09-17 PROCEDURE — 36415 COLL VENOUS BLD VENIPUNCTURE: CPT

## 2023-09-17 PROCEDURE — 93005 ELECTROCARDIOGRAM TRACING: CPT | Performed by: EMERGENCY MEDICINE

## 2023-09-17 ASSESSMENT — ENCOUNTER SYMPTOMS
COUGH: 0
DIARRHEA: 0
FACIAL SWELLING: 0
EYE DISCHARGE: 0
NAUSEA: 0
TROUBLE SWALLOWING: 0
SORE THROAT: 0
ABDOMINAL PAIN: 0
CHEST TIGHTNESS: 0
ORTHOPNEA: 0
VOMITING: 0
HEARTBURN: 0
SHORTNESS OF BREATH: 0
BACK PAIN: 0
EYE PAIN: 0

## 2023-09-17 ASSESSMENT — PAIN - FUNCTIONAL ASSESSMENT: PAIN_FUNCTIONAL_ASSESSMENT: 0-10

## 2023-09-17 ASSESSMENT — PAIN DESCRIPTION - LOCATION: LOCATION: CHEST

## 2023-09-17 ASSESSMENT — PAIN SCALES - GENERAL: PAINLEVEL_OUTOF10: 5

## 2023-09-17 NOTE — ED PROVIDER NOTES
congestion, ear pain, facial swelling, sore throat and trouble swallowing. Eyes:  Negative for pain, discharge and visual disturbance. Respiratory:  Negative for cough, chest tightness and shortness of breath. Cardiovascular:  Positive for chest pain. Negative for palpitations, orthopnea, claudication, syncope and PND. Gastrointestinal:  Negative for abdominal pain, anorexia, diarrhea, heartburn, nausea and vomiting. Endocrine: Negative for cold intolerance, heat intolerance, polydipsia and polyphagia. Genitourinary:  Negative for difficulty urinating, dysuria, frequency, hematuria, urgency and vaginal discharge. Musculoskeletal:  Negative for arthralgias, back pain, joint swelling and myalgias. Skin:  Negative for rash and wound. Neurological:  Negative for dizziness, syncope, facial asymmetry, numbness and headaches. Psychiatric/Behavioral:  Negative for agitation, behavioral problems and confusion. All other systems reviewed and are negative. Except as noted above the remainder of the review of systems was reviewed and negative.        PAST MEDICAL HISTORY     Past Medical History:   Diagnosis Date    Anemia     Depression 2013    Diabetes ([address])     Essential hypertension     Hypertension     IBS (irritable bowel syndrome)          SURGICAL HISTORY       Past Surgical History:   Procedure Laterality Date     DELIVERY ONLY      TWins at 35 wks; IOL; NRFS         CURRENT MEDICATIONS       Discharge Medication List as of 2023  3:21 AM        CONTINUE these medications which have NOT CHANGED    Details   dicyclomine (BENTYL) 20 MG tablet Take 1 tablet by mouth 4 times daily for 7 days, Disp-28 tablet, R-0Print      albuterol sulfate HFA (PROVENTIL;VENTOLIN;PROAIR) 108 (90 Base) MCG/ACT inhaler Inhale 1 puff into the lungs every 4 hours as neededHistorical Med      aspirin 81 MG EC tablet Take by mouth dailyHistorical Med      cyclobenzaprine (FLEXERIL) 10 MG tablet

## 2023-09-17 NOTE — ED NOTES
Pt was discharged and given instructions by Dr Jersey Morse. Pt verbalized good understanding of all discharge instructions and F/U care. All questions answered. Pt in stable condition on discharge.        Kay Bunn RN  09/17/23 [id number]

## 2023-09-17 NOTE — ED TRIAGE NOTES
Patient ambulatory with steady gait to tx room, reports chest pain on the right side about four hours ago, denies recent illness or injury, reports [place] rides at the fair with her kids today. Reports around mid night she was clammy and sweaty with chest pain.

## 2023-09-18 LAB
EKG ATRIAL RATE: 86 BPM
EKG DIAGNOSIS: NORMAL
EKG P AXIS: 49 DEGREES
EKG P-R INTERVAL: 148 MS
EKG Q-T INTERVAL: 368 MS
EKG QRS DURATION: 68 MS
EKG QTC CALCULATION (BAZETT): 440 MS
EKG R AXIS: 51 DEGREES
EKG T AXIS: 25 DEGREES
EKG VENTRICULAR RATE: 86 BPM

## 2023-09-18 PROCEDURE — 93010 ELECTROCARDIOGRAM REPORT: CPT | Performed by: STUDENT IN AN ORGANIZED HEALTH CARE EDUCATION/TRAINING PROGRAM

## 2025-09-01 ENCOUNTER — HOSPITAL ENCOUNTER (EMERGENCY)
Facility: HOSPITAL | Age: 40
Discharge: HOME | End: 2025-09-01
Attending: EMERGENCY MEDICINE

## 2025-09-01 ENCOUNTER — APPOINTMENT (OUTPATIENT)
Facility: HOSPITAL | Age: 40
End: 2025-09-01

## 2025-09-01 VITALS
WEIGHT: 245 LBS | BODY MASS INDEX: 48.1 KG/M2 | HEIGHT: 60 IN | RESPIRATION RATE: 16 BRPM | TEMPERATURE: 98.6 F | HEART RATE: 95 BPM | SYSTOLIC BLOOD PRESSURE: 164 MMHG | OXYGEN SATURATION: 99 % | DIASTOLIC BLOOD PRESSURE: 107 MMHG

## 2025-09-01 DIAGNOSIS — S39.012A STRAIN OF LUMBAR REGION, INITIAL ENCOUNTER: ICD-10-CM

## 2025-09-01 DIAGNOSIS — M25.511 ACUTE PAIN OF RIGHT SHOULDER: ICD-10-CM

## 2025-09-01 DIAGNOSIS — S16.1XXA STRAIN OF NECK MUSCLE, INITIAL ENCOUNTER: ICD-10-CM
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
## 2025-09-01 PROCEDURE — 73030 X-RAY EXAM OF SHOULDER: CPT

## 2025-09-01 PROCEDURE — 6360000002 HC RX W HCPCS: Performed by: EMERGENCY MEDICINE

## 2025-09-01 PROCEDURE — 72100 X-RAY EXAM L-S SPINE 2/3 VWS: CPT

## 2025-09-01 PROCEDURE — 96372 THER/PROPH/DIAG INJ SC/IM: CPT

## 2025-09-01 PROCEDURE — 72050 X-RAY EXAM NECK SPINE 4/5VWS: CPT

## 2025-09-01 PROCEDURE — 99284 EMERGENCY DEPT VISIT MOD MDM: CPT

## 2025-09-01 RX ORDER — KETOROLAC TROMETHAMINE 30 MG/ML
30 INJECTION, SOLUTION INTRAMUSCULAR; INTRAVENOUS ONCE
Status: COMPLETED | OUTPATIENT
Start: 2025-09-01 | End: 2025-09-01

## 2025-09-01 RX ORDER — CYCLOBENZAPRINE HCL 10 MG
10 TABLET ORAL 3 TIMES DAILY PRN
Qty: 12 TABLET | Refills: 0 | Status: SHIPPED | OUTPATIENT
Start: 2025-09-01

## 2025-09-01 RX ORDER — LIDOCAINE 50 MG/G
1 PATCH TOPICAL DAILY
Qty: 10 PATCH | Refills: 0 | Status: SHIPPED | OUTPATIENT
Start: 2025-09-01 | End: 2025-09-11

## 2025-09-01 RX ADMIN — KETOROLAC TROMETHAMINE 30 MG: 30 INJECTION, SOLUTION INTRAMUSCULAR at 13:28

## 2025-09-01 ASSESSMENT — LIFESTYLE VARIABLES
HOW OFTEN DO YOU HAVE A DRINK CONTAINING ALCOHOL: NEVER
HOW MANY STANDARD DRINKS CONTAINING ALCOHOL DO YOU HAVE ON A TYPICAL DAY: PATIENT DOES NOT DRINK

## 2025-09-01 ASSESSMENT — PAIN - FUNCTIONAL ASSESSMENT: PAIN_FUNCTIONAL_ASSESSMENT: 0-10
